# Patient Record
Sex: FEMALE | Race: WHITE | NOT HISPANIC OR LATINO | Employment: OTHER | URBAN - METROPOLITAN AREA
[De-identification: names, ages, dates, MRNs, and addresses within clinical notes are randomized per-mention and may not be internally consistent; named-entity substitution may affect disease eponyms.]

---

## 2017-12-04 ENCOUNTER — CONVERSION ENCOUNTER (OUTPATIENT)
Dept: MAMMOGRAPHY | Facility: HOSPITAL | Age: 62
End: 2017-12-04

## 2019-01-14 ENCOUNTER — HOSPITAL ENCOUNTER (OUTPATIENT)
Dept: GENERAL RADIOLOGY | Facility: HOSPITAL | Age: 64
Discharge: HOME | End: 2019-01-14
Attending: INTERNAL MEDICINE

## 2019-07-30 ENCOUNTER — OFFICE VISIT CONVERTED (OUTPATIENT)
Dept: FAMILY MEDICINE CLINIC | Facility: CLINIC | Age: 64
End: 2019-07-30
Attending: NURSE PRACTITIONER

## 2019-08-13 ENCOUNTER — HOSPITAL ENCOUNTER (OUTPATIENT)
Dept: GENERAL RADIOLOGY | Facility: HOSPITAL | Age: 64
Discharge: HOME | End: 2019-08-13
Attending: NURSE PRACTITIONER

## 2019-11-12 ENCOUNTER — OFFICE VISIT CONVERTED (OUTPATIENT)
Dept: FAMILY MEDICINE CLINIC | Facility: CLINIC | Age: 64
End: 2019-11-12
Attending: NURSE PRACTITIONER

## 2019-11-14 ENCOUNTER — HOSPITAL ENCOUNTER (OUTPATIENT)
Dept: LAB | Facility: HOSPITAL | Age: 64
Discharge: HOME | End: 2019-11-14
Attending: NURSE PRACTITIONER

## 2019-11-14 LAB
25(OH)D3 SERPL-MCNC: 34 NG/ML (ref 30–100)
ALBUMIN SERPL-MCNC: 4.4 G/DL (ref 3.5–5)
ALBUMIN/GLOB SERPL: 1.5 {RATIO} (ref 1.4–2.6)
ALP SERPL-CCNC: 84 U/L (ref 43–160)
ALT SERPL-CCNC: 10 U/L (ref 10–40)
ANION GAP SERPL CALC-SCNC: 18 MMOL/L (ref 8–19)
AST SERPL-CCNC: 19 U/L (ref 15–50)
BASOPHILS # BLD AUTO: 0.06 10*3/UL (ref 0–0.2)
BASOPHILS NFR BLD AUTO: 0.6 % (ref 0–3)
BILIRUB SERPL-MCNC: 0.43 MG/DL (ref 0.2–1.3)
BUN SERPL-MCNC: 5 MG/DL (ref 5–25)
BUN/CREAT SERPL: 7 {RATIO} (ref 6–20)
CALCIUM SERPL-MCNC: 9.8 MG/DL (ref 8.7–10.4)
CHLORIDE SERPL-SCNC: 97 MMOL/L (ref 99–111)
CHOLEST SERPL-MCNC: 217 MG/DL (ref 107–200)
CHOLEST/HDLC SERPL: 3.4 {RATIO} (ref 3–6)
CONV ABS IMM GRAN: 0.03 10*3/UL (ref 0–0.2)
CONV CO2: 26 MMOL/L (ref 22–32)
CONV IMMATURE GRAN: 0.3 % (ref 0–1.8)
CONV TOTAL PROTEIN: 7.3 G/DL (ref 6.3–8.2)
CREAT UR-MCNC: 0.73 MG/DL (ref 0.5–0.9)
DEPRECATED RDW RBC AUTO: 50.3 FL (ref 36.4–46.3)
EOSINOPHIL # BLD AUTO: 0.19 10*3/UL (ref 0–0.7)
EOSINOPHIL # BLD AUTO: 2 % (ref 0–7)
ERYTHROCYTE [DISTWIDTH] IN BLOOD BY AUTOMATED COUNT: 14.5 % (ref 11.7–14.4)
GFR SERPLBLD BASED ON 1.73 SQ M-ARVRAT: >60 ML/MIN/{1.73_M2}
GLOBULIN UR ELPH-MCNC: 2.9 G/DL (ref 2–3.5)
GLUCOSE SERPL-MCNC: 97 MG/DL (ref 65–99)
HCT VFR BLD AUTO: 45 % (ref 37–47)
HDLC SERPL-MCNC: 63 MG/DL (ref 40–60)
HGB BLD-MCNC: 15.1 G/DL (ref 12–16)
LDLC SERPL CALC-MCNC: 137 MG/DL (ref 70–100)
LYMPHOCYTES # BLD AUTO: 4.21 10*3/UL (ref 1–5)
LYMPHOCYTES NFR BLD AUTO: 44 % (ref 20–45)
MCH RBC QN AUTO: 31.3 PG (ref 27–31)
MCHC RBC AUTO-ENTMCNC: 33.6 G/DL (ref 33–37)
MCV RBC AUTO: 93.2 FL (ref 81–99)
MONOCYTES # BLD AUTO: 0.93 10*3/UL (ref 0.2–1.2)
MONOCYTES NFR BLD AUTO: 9.7 % (ref 3–10)
NEUTROPHILS # BLD AUTO: 4.14 10*3/UL (ref 2–8)
NEUTROPHILS NFR BLD AUTO: 43.4 % (ref 30–85)
NRBC CBCN: 0 % (ref 0–0.7)
OSMOLALITY SERPL CALC.SUM OF ELEC: 281 MOSM/KG (ref 273–304)
PLATELET # BLD AUTO: 400 10*3/UL (ref 130–400)
PMV BLD AUTO: 9.1 FL (ref 9.4–12.3)
POTASSIUM SERPL-SCNC: 4.2 MMOL/L (ref 3.5–5.3)
RBC # BLD AUTO: 4.83 10*6/UL (ref 4.2–5.4)
SODIUM SERPL-SCNC: 137 MMOL/L (ref 135–147)
T4 FREE SERPL-MCNC: 1.2 NG/DL (ref 0.9–1.8)
TRIGL SERPL-MCNC: 85 MG/DL (ref 40–150)
TSH SERPL-ACNC: 3.49 M[IU]/L (ref 0.27–4.2)
VLDLC SERPL-MCNC: 17 MG/DL (ref 5–37)
WBC # BLD AUTO: 9.56 10*3/UL (ref 4.8–10.8)

## 2019-11-15 LAB
CONV HEPATITIS C AB WITH REFLEX TO CONFIRMATION: <0.1 S/CO RATIO (ref 0–0.9)
CONV HEPATITIS COMMENT: NORMAL

## 2020-02-10 ENCOUNTER — HOSPITAL ENCOUNTER (OUTPATIENT)
Dept: GENERAL RADIOLOGY | Facility: HOSPITAL | Age: 65
Discharge: HOME | End: 2020-02-10
Attending: NURSE PRACTITIONER

## 2021-03-11 ENCOUNTER — OFFICE VISIT CONVERTED (OUTPATIENT)
Dept: FAMILY MEDICINE CLINIC | Facility: CLINIC | Age: 66
End: 2021-03-11
Attending: NURSE PRACTITIONER

## 2021-03-11 ENCOUNTER — CONVERSION ENCOUNTER (OUTPATIENT)
Dept: FAMILY MEDICINE CLINIC | Facility: CLINIC | Age: 66
End: 2021-03-11

## 2021-03-16 ENCOUNTER — HOSPITAL ENCOUNTER (OUTPATIENT)
Dept: LAB | Facility: HOSPITAL | Age: 66
Discharge: HOME | End: 2021-03-16
Attending: NURSE PRACTITIONER

## 2021-03-16 LAB
ALBUMIN SERPL-MCNC: 4 G/DL (ref 3.5–5)
ALBUMIN/GLOB SERPL: 1.1 {RATIO} (ref 1.4–2.6)
ALP SERPL-CCNC: 93 U/L (ref 43–160)
ALT SERPL-CCNC: 14 U/L (ref 10–40)
ANION GAP SERPL CALC-SCNC: 15 MMOL/L (ref 8–19)
AST SERPL-CCNC: 22 U/L (ref 15–50)
BASOPHILS # BLD AUTO: 0.04 10*3/UL (ref 0–0.2)
BASOPHILS NFR BLD AUTO: 0.5 % (ref 0–3)
BILIRUB SERPL-MCNC: 0.43 MG/DL (ref 0.2–1.3)
BUN SERPL-MCNC: 8 MG/DL (ref 5–25)
BUN/CREAT SERPL: 10 {RATIO} (ref 6–20)
CALCIUM SERPL-MCNC: 9.5 MG/DL (ref 8.7–10.4)
CHLORIDE SERPL-SCNC: 100 MMOL/L (ref 99–111)
CHOLEST SERPL-MCNC: 193 MG/DL (ref 107–200)
CHOLEST/HDLC SERPL: 3.1 {RATIO} (ref 3–6)
CONV ABS IMM GRAN: 0.01 10*3/UL (ref 0–0.2)
CONV CO2: 27 MMOL/L (ref 22–32)
CONV IMMATURE GRAN: 0.1 % (ref 0–1.8)
CONV TOTAL PROTEIN: 7.5 G/DL (ref 6.3–8.2)
CREAT UR-MCNC: 0.82 MG/DL (ref 0.5–0.9)
DEPRECATED RDW RBC AUTO: 50.6 FL (ref 36.4–46.3)
EOSINOPHIL # BLD AUTO: 0.09 10*3/UL (ref 0–0.7)
EOSINOPHIL # BLD AUTO: 1.1 % (ref 0–7)
ERYTHROCYTE [DISTWIDTH] IN BLOOD BY AUTOMATED COUNT: 14.6 % (ref 11.7–14.4)
GFR SERPLBLD BASED ON 1.73 SQ M-ARVRAT: >60 ML/MIN/{1.73_M2}
GLOBULIN UR ELPH-MCNC: 3.5 G/DL (ref 2–3.5)
GLUCOSE SERPL-MCNC: 98 MG/DL (ref 65–99)
HCT VFR BLD AUTO: 47.9 % (ref 37–47)
HDLC SERPL-MCNC: 63 MG/DL (ref 40–60)
HGB BLD-MCNC: 15.7 G/DL (ref 12–16)
LDLC SERPL CALC-MCNC: 114 MG/DL (ref 70–100)
LYMPHOCYTES # BLD AUTO: 4.52 10*3/UL (ref 1–5)
LYMPHOCYTES NFR BLD AUTO: 55.1 % (ref 20–45)
MCH RBC QN AUTO: 31.5 PG (ref 27–31)
MCHC RBC AUTO-ENTMCNC: 32.8 G/DL (ref 33–37)
MCV RBC AUTO: 96 FL (ref 81–99)
MONOCYTES # BLD AUTO: 0.94 10*3/UL (ref 0.2–1.2)
MONOCYTES NFR BLD AUTO: 11.5 % (ref 3–10)
NEUTROPHILS # BLD AUTO: 2.6 10*3/UL (ref 2–8)
NEUTROPHILS NFR BLD AUTO: 31.7 % (ref 30–85)
NRBC CBCN: 0 % (ref 0–0.7)
OSMOLALITY SERPL CALC.SUM OF ELEC: 284 MOSM/KG (ref 273–304)
PLATELET # BLD AUTO: 404 10*3/UL (ref 130–400)
PMV BLD AUTO: 9.6 FL (ref 9.4–12.3)
POTASSIUM SERPL-SCNC: 4 MMOL/L (ref 3.5–5.3)
RBC # BLD AUTO: 4.99 10*6/UL (ref 4.2–5.4)
SODIUM SERPL-SCNC: 138 MMOL/L (ref 135–147)
T4 FREE SERPL-MCNC: 1.3 NG/DL (ref 0.9–1.8)
TRIGL SERPL-MCNC: 81 MG/DL (ref 40–150)
TSH SERPL-ACNC: 6.75 M[IU]/L (ref 0.27–4.2)
VLDLC SERPL-MCNC: 16 MG/DL (ref 5–37)
WBC # BLD AUTO: 8.2 10*3/UL (ref 4.8–10.8)

## 2021-05-10 NOTE — H&P
History and Physical      Patient Name: Selene Forrest   Patient ID: [id number]   Sex: Female   YOB: 1955    Primary Care Provider: Gwen ALVAREZ   Referring Provider: Gwen ALVAREZ    Visit Date: March 11, 2021    Provider: KIM Curiel   Location: [facility]   Location Address: [address] [place] [address], [address]  [place], [place]  [id number]   Location Phone: [phone]          Chief Complaint  · New Pt. - establish care      History Of Present Illness  Selene Forrest is a 65 year old /White female who presents for evaluation and treatment of:      Pt is here to establish care. Pt was previously seen by Julia Brandon. Pt would like to discuss starting Chantix. Pt tried back in 2008 and was successful but has since started back.  She smokes 1ppd for 30 + years. Pt states she is ready to quit.    Pt is due labs.    Pt is due mammogram and dexa.    Pt will have MAW visit today, also.     Pt is due LDCT.    Pt sees Dr. Elmore q 3 months for PSY care and med mgmt.       Past Medical History  Disease Name Date Onset Notes   Bone Density Screening 8/13/2019 --    Depression --  --    Screening Mammogram 1/14/2019 --          Past Surgical History  Procedure Name Date Notes   Breast augmentation surgery, bilateral --  --    Dilation and Curettage --  --    Hernia Repair --  --    Lumpectomy --  --    nose --  --    Tubal ligation --  --          Medication List  Name Date Started Instructions   aspirin 81 mg oral tablet,delayed release (/EC) 11/12/2019 take 1 tablet (81 mg) by oral route once daily for 90 days   Prozac 20 mg oral capsule  take 1 capsule (20 mg) by oral route once daily   Vitamin D3 5,000 unit oral tablet 11/12/2019 take 1 tablet by oral route daily for 90 days         Allergy List  Allergen Name Date Reaction Notes   NO KNOWN DRUG ALLERGIES --  --  --          Family Medical History  Disease Name Relative/Age Notes   Stroke Grandfather  "(maternal)/  Mother/   --    Heart Disease Brother/  Father/  Grandfather (paternal)/  Grandmother (paternal)/   --    Hypertension Mother/   --    Breast Neoplasm Aunt/  Mother/  Uncle/   --    Diabetes  --          Social History  Finding Status Start/Stop Quantity Notes   Alcohol Current some day --/-- --  --    Sedentary --  --/-- --  --    Tobacco Current every day 20/-- 1 ppd --          Immunizations  NameDate Admin Mfg Trade Name Lot Number Route Inj VIS Given VIS Publication   InfluenzaRefused 03/11/2021 NE Not Entered  NE NE     Comments:          Review of Systems  · Constitutional  o Admits  o : fatigue  o Denies  o : fever, weight loss, weight gain  · Breasts  o Denies  o : lumps, tenderness, swelling  · Cardiovascular  o Denies  o : lower extremity edema, claudication, chest pressure, palpitations  · Respiratory  o Denies  o : shortness of breath, wheezing, cough, hemoptysis, dyspnea on exertion  · Gastrointestinal  o Denies  o : nausea, vomiting, diarrhea, constipation, abdominal pain  · Psychiatric  o Admits  o : anxiety, depression      Vitals  Date Time BP Position Site L\R Cuff Size HR RR TEMP (F) WT  HT  BMI kg/m2 BSA m2 O2 Sat FR L/min FiO2 HC       07/30/2019 02:48 /75 Sitting    80 - R 20 97.8 164lbs 6oz 5'  3\" 29.12 1.82 94 %  21%    11/12/2019 01:35 /82 Sitting    90 - R   164lbs 3oz 5'  3\" 29.08 1.82       03/11/2021 03:09 /71 Sitting    82 - R   176lbs 16oz 5'  3\" 31.35 1.89 93 %  21%          Physical Examination  · Constitutional  o Appearance  o : well-nourished, well developed, alert, in no acute distress  · Ears, Nose, Mouth and Throat  o Ears  o :   § External Ears  § : appearance within normal limits, no lesions present  § Otoscopic Examination  § : tympanic membrane appearance within normal limits bilaterally without perforations, mobility normal  o Nose  o :   § External Nose  § : normal stucture noted.  § Intranasal Exam  § : no swelling, reddness, turbs normal " samantha.  o Oral Cavity  o :   § Oral Mucosa  § : oral mucosa normal without pallor or cyanosis  § Lips  § : lip appearance normal  § Teeth  § : normal dentition for age  § Gums  § : gums pink, non-swollen, no bleeding present  § Tongue  § : tongue appearance normal  § Palate  § : hard palate normal, soft palate appearance normal  o Throat  o :   § Oropharynx  § : no inflammation or lesions present, tonsils within normal limits  · Respiratory  o Respiratory Effort  o : breathing unlabored  o Auscultation of Lungs  o : normal breath sounds throughout  · Cardiovascular  o Heart  o :   § Auscultation of Heart  § : regular rate and rhythm, no murmurs, gallops or rubs  § Palpation of Heart  § : normal apical impulse, no cardiac thrill present  o Peripheral Vascular System  o :   § Extremities  § : no cyanosis, clubbing or edema; less than 2 second refill noted  · Skin and Subcutaneous Tissue  o General Inspection  o : no rashes or lesions present, no areas of discoloration  · Neurologic  o Mental Status Examination  o :   § Orientation  § : grossly oriented to person, place and time  o Cranial Nerves  o : cranial nerves intact and symmetric throughout  · Psychiatric  o Mood and Affect  o : mood normal, affect appropriate, denies any SI/HI          Assessment  · Depression     311/F32.9  · Fatigue     780.79/R53.83  · Nicotine dependence     305.1/F17.200  · Post-menopause     V49.81/Z78.0  · Screening for lipid disorders     V77.91/Z13.220  · Visit for screening mammogram     V76.12/Z12.31  · Establishing care with new doctor, encounter for     V65.8/Z76.89      Plan  · Orders  o CBC with Auto Diff [facility] ([id number]) - 780.79/R53.83 - 03/11/2021  o CMP [facility] ([id number]) - 780.79/R53.83 - 03/11/2021  o Thyroid Profile (36600, 95061, THYII) - 780.79/R53.83 - 03/11/2021  o Smoking cessation counseling, 3-10 minutes [facility] ([id number]) - 305.1/F17.200 - 03/11/2021  o ACO-17: Screened for tobacco use AND received tobacco cessation intervention  "(4004F) - 305.1/F17.200 - 03/11/2021  o Low dose CT scan (LDCT) for lung cancer screening [facility] () - 305.1/F17.200 - 03/11/2021  o DEXA Bone Density, 1 or more sites, axial skeleton [facility] (93804) - V49.81/Z78.0 - 03/11/2021   8/3/21 @ PRAMOD 2 pm.  o Lipid Panel [facility] (40951) - V77.91/Z13.220 - 03/11/2021  o Screening Mammography; Bilateral 3D (71647, , 76591) - V76.12/Z12.31 - 03/11/2021   PRAMOD 8/3/21 @ 2 pm  o ACO-14: Influenza immunization was not administered for reasons documented [facility] () - - 03/11/2021  o ACO-19: Colorectal cancer screening results documented and reviewed (3017F) - - 03/11/2021  o ACO-39: Current medications updated and reviewed (, 1159F) - - 03/11/2021  · Medications  o Chantix Starting Month Box 0.5 mg (11)- 1 mg (42) oral tablets,dose pack   SIG: take as directed   DISP: (1) Packet with 0 refills  Prescribed on 03/11/2021     o Chantix Continuing Month Box 1 mg oral tablet   SIG: take 1 tablet (1 mg) with glass of water by oral route 2 times per day after meals for 12 weeks   DISP: (168) Tablet with 0 refills  Prescribed on 03/11/2021     o Medications have been Reconciled  o Transition of Care or Provider Policy  · Instructions  o Patient agrees to a \"No Self Harm\" contract. Patient will either call us, 1, , [facility], [facility].  o *Form of nicotine being used: cigarettes  o Patient was strongly encouraged to discontinue use of any nicotine containing product or minimize the use of the product.  o Discussed smoking cessation and counseling with patient for over 3 minutes.  o Stop taking calcium supplements for at least 48 hours prior to your exam. Failure to stop supplements could alter results, and the radiologists will require you to reschedule your test.  o Patient was educated/instructed on their diagnosis, treatment and medications prior to discharge from the clinic today.  o Patient instructed to seek medical attention urgently for new " or worsening symptoms.  o Call the office with any concerns or questions.  · Disposition  o Call or Return if symptoms worsen or persist.  o Return Visit Request in/on 6 months +/- 2 weeks (89075).            Electronically Signed by: KIM Curiel -Author on March 11, 2021 03:28:25 PM

## 2021-05-14 VITALS
WEIGHT: 177 LBS | HEART RATE: 82 BPM | BODY MASS INDEX: 31.36 KG/M2 | HEIGHT: 63 IN | SYSTOLIC BLOOD PRESSURE: 157 MMHG | DIASTOLIC BLOOD PRESSURE: 71 MMHG | OXYGEN SATURATION: 93 %

## 2021-05-14 NOTE — PROGRESS NOTES
Progress Note      Patient Name: Selene Forrest   Patient ID: [id number]   Sex: Female   YOB: 1955    Primary Care Provider: Julia ALVAREZ   Referring Provider: Julia ALVAREZ    Visit Date: March 11, 2021    Provider: KIM Curiel   Location: [facility]   Location Address: [address] [place] [address], [address]  [place], [place]  [id number]   Location Phone: [phone]          Chief Complaint  · Annual Wellness Exam      History Of Present Illness  The patient is a 65 year old /White female who has come to this office for her Annual Wellness Visit.   Her Primary Care Provider is PRIMARY CARE PROVIDER NAME. Her comprehensive Care Team list, including suppliers, has been updated on the Facesheet. Her medical/family history, height, weight, BMI, and blood pressure have been reviewed and are in the chart. The Health Risk Assessment has been completed and scanned in the chart.   Medications are listed in the medication list.   The active problem list includes: Depression   The patient does not have a history of substance use.   Patient reports her diet is adequate.   The Mini-Cog has been administered and is scanned in chart. The results are negative. Her cognitive function is without limitation.   A hearing loss screen was completed today and the result is negative.   Patient does not have any risk factors for depression. Patient completed the PHQ-9 today and it has been scanned in the chart. The total score is 1-4.   The Timed Up and Go screen was administered today and the result is negative.   The [place] Index of [place] in ADLs indicated full function (score of 6).   A Falls Risk Assessment has been completed, including a review of home fall hazards and medication review.   Overall, the patient's functional ability is noted by this provider to be within normal limits. Her level of safety is noted to be within normal limits. Her balance/gait is within  normal limits. There have been no falls in the past year. Patient-specific home safety recommendations have been reviewed and a copy has been given to patient.   She denies issues with leaking urine.   There are no additional risk factors identified.   Living Will/Advanced Directive has not previously been completed.   Personalized health advice was given to the patient and a written health screening schedule was established; see Plan for details.       Past Medical History  Disease Name Date Onset Notes   Bone Density Screening 8/13/2019 --    Depression --  --    Screening Mammogram 1/14/2019 --          Past Surgical History  Procedure Name Date Notes   Breast augmentation surgery, bilateral --  --    Dilation and Curettage --  --    Hernia Repair --  --    Lumpectomy --  --    nose --  --    Tubal ligation --  --          Medication List  Name Date Started Instructions   aspirin 81 mg oral tablet,delayed release (/EC) 11/12/2019 take 1 tablet (81 mg) by oral route once daily for 90 days   Chantix Continuing Month Box 1 mg oral tablet 03/11/2021 take 1 tablet (1 mg) with glass of water by oral route 2 times per day after meals for 12 weeks   Chantix Starting Month Box 0.5 mg (11)- 1 mg (42) oral tablets,dose pack 03/11/2021 take as directed   Prozac 20 mg oral capsule  take 1 capsule (20 mg) by oral route once daily   Vitamin D3 5,000 unit oral tablet 11/12/2019 take 1 tablet by oral route daily for 90 days         Allergy List  Allergen Name Date Reaction Notes   NO KNOWN DRUG ALLERGIES --  --  --          Family Medical History  Disease Name Relative/Age Notes   Stroke Grandfather (maternal)/  Mother/   --    Heart Disease Brother/  Father/  Grandfather (paternal)/  Grandmother (paternal)/   --    Hypertension Mother/   --    Breast Neoplasm Aunt/  Mother/  Uncle/   --    Diabetes  --          Social History  Finding Status Start/Stop Quantity Notes   Alcohol Current some day --/-- --  --    Sedentary --   "--/-- --  --    Tobacco Current every day 20/-- 1 ppd --          Immunizations  NameDate Admin Mfg Trade Name Lot Number Route Inj VIS Given VIS Publication   InfluenzaRefused 03/11/2021 NE Not Entered  NE NE     Comments:          Vitals  Date Time BP Position Site L\R Cuff Size HR RR TEMP (F) WT  HT  BMI kg/m2 BSA m2 O2 Sat FR L/min FiO2 HC       07/30/2019 02:48 /75 Sitting    80 - R 20 97.8 164lbs 6oz 5'  3\" 29.12 1.82 94 %  21%    11/12/2019 01:35 /82 Sitting    90 - R   164lbs 3oz 5'  3\" 29.08 1.82       03/11/2021 03:09 /71 Sitting    82 - R   176lbs 16oz 5'  3\" 31.35 1.89 93 %  21%          Physical Examination  · Ears, Nose, Mouth and Throat  o Ears  o :   § External Ears  § : appearance within normal limits, no lesions present  § Otoscopic Examination  § : tympanic membrane appearance within normal limits bilaterally without perforations, mobility normal  o Nose  o :   § External Nose  § : normal stucture noted.  § Intranasal Exam  § : no swelling, reddness, turbs normal samantha.  o Oral Cavity  o :   § Oral Mucosa  § : oral mucosa normal without pallor or cyanosis  § Lips  § : lip appearance normal  § Teeth  § : normal dentition for age  § Gums  § : gums pink, non-swollen, no bleeding present  § Tongue  § : tongue appearance normal  § Palate  § : hard palate normal, soft palate appearance normal  o Throat  o :   § Oropharynx  § : no inflammation or lesions present, tonsils within normal limits  · Respiratory  o Respiratory Effort  o : breathing unlabored  o Auscultation of Lungs  o : normal breath sounds throughout  · Cardiovascular  o Heart  o :   § Auscultation of Heart  § : regular rate and rhythm, no murmurs, gallops or rubs  § Palpation of Heart  § : normal apical impulse, no cardiac thrill present  o Peripheral Vascular System  o :   § Carotid Arteries  § : normal pulses bilaterally, no bruits present  § Pedal Pulses  § : pulses 2 bilaterally  § Extremities  § : no cyanosis, clubbing " or edema; less than 2 second refill noted  · Gastrointestinal  o Abdominal Examination  o : abdomen nontender to palpation, tone normal without rigidity or guarding, no masses present, abdominal contour scaphoid  o Liver and spleen  o : no hepatomegaly present, liver nontender to palpation, spleen not palpable  · Musculoskeletal  o Right Upper Extremity  o :   § Inspection/Palpation  § : no tenderness to palpation  § Range of Motion  § : range of motion normal, no joint crepitus or pain with motion present  o Left Upper Extremity  o :   § Inspection/Palpation  § : no tenderness to palpation  § Range of Motion  § : range of motion normal, no joint crepitus present, no pain with joint motion  o Right Lower Extremity  o :   § Inspection/Palpation  § : no joint or limb tenderness to palpation  § Range of Motion  § : range of motion normal, no joint crepitations present, no pain on motion  o Left Lower Extremity  o :   § Inspection/Palpation  § : no joint or limb tenderness to palpation  § Range of Motion  § : range of motion normal, no joint crepitations present, no pain on motion  · Skin and Subcutaneous Tissue  o General Inspection  o : no rashes or lesions present, no areas of discoloration  · Neurologic  o Mental Status Examination  o :   § Orientation  § : grossly oriented to person, place and time  o Cranial Nerves  o : cranial nerves intact and symmetric throughout  · Psychiatric  o Mood and Affect  o : mood normal, affect appropriate, denies any SI/HI          Assessment  · Encounter for Medicare annual wellness exam     V70.0/Z00.00  · Screening for depression     V79.0/Z13.89  · Screening for alcoholism     V79.1/Z13.39  · Advanced care planning/counseling discussion     V65.49/Z71.89      Plan  · Orders  o Falls Risk Assessment Completed (3288F) - V70.0/Z00.00 - 03/11/2021  o Brief hearing screening (written) [facility] () - V70.0/Z00.00 - 03/11/2021  o Presence or absence of urinary incontinence assessed (OVI)  (1090F) - V70.0/Z00.00 - 03/11/2021  o ACO-18: Negative screen for clinical depression using a standardized tool () - V79.0/Z13.89 - 03/11/2021  o Negative alcohol screening () - V79.1/Z13.39 - 03/11/2021  o ACO-19: Colorectal cancer screening results documented and reviewed (3017F) - - 03/11/2021  o ACO-39: Current medications updated and reviewed (, 1159F) - - 03/11/2021  o ACO - Pt declines to or was not able to provide an Advance Care Plan or name a Surrogate Decision Maker (1124F) - - 03/11/2021  · Instructions  o Health Risk Assessment has been reviewed with the patient.  o Written health screening schedule for next 5-10 years was established with patient; information scanned in chart and given/mailed to patient.  o Fall prevention methods discussed and a copy of recommendations given/mailed to patient.  o Depression Screen completed and scanned into the EMR under the designated folder within the patient's documents.  o Today's PHQ-9 result is _4__. Pt is currently in counselling.   o Audit-C score of 0-4 - Negative Screen - Brief Discussion  o Face-to-face Advanced Care Planning discussed for a minimum of 16 minutes.            Electronically Signed by: KIM Curiel -Author on March 11, 2021 03:58:01 PM

## 2021-05-15 VITALS
HEIGHT: 63 IN | WEIGHT: 164.19 LBS | BODY MASS INDEX: 29.09 KG/M2 | HEART RATE: 90 BPM | DIASTOLIC BLOOD PRESSURE: 82 MMHG | SYSTOLIC BLOOD PRESSURE: 135 MMHG

## 2021-05-15 VITALS
BODY MASS INDEX: 29.12 KG/M2 | TEMPERATURE: 97.8 F | RESPIRATION RATE: 20 BRPM | SYSTOLIC BLOOD PRESSURE: 146 MMHG | HEIGHT: 63 IN | OXYGEN SATURATION: 94 % | WEIGHT: 164.37 LBS | HEART RATE: 80 BPM | DIASTOLIC BLOOD PRESSURE: 75 MMHG

## 2021-05-22 ENCOUNTER — TRANSCRIBE ORDERS (OUTPATIENT)
Dept: FAMILY MEDICINE CLINIC | Facility: CLINIC | Age: 66
End: 2021-05-22

## 2021-05-22 DIAGNOSIS — Z12.31 VISIT FOR SCREENING MAMMOGRAM: Primary | ICD-10-CM

## 2021-05-22 DIAGNOSIS — R93.7 ABNORMAL BONE DENSITY SCREENING: ICD-10-CM

## 2021-08-03 ENCOUNTER — HOSPITAL ENCOUNTER (OUTPATIENT)
Dept: MAMMOGRAPHY | Facility: HOSPITAL | Age: 66
Discharge: HOME | End: 2021-08-03

## 2021-08-03 ENCOUNTER — HOSPITAL ENCOUNTER (OUTPATIENT)
Dept: BONE DENSITY | Facility: HOSPITAL | Age: 66
Discharge: HOME | End: 2021-08-03

## 2021-08-03 DIAGNOSIS — R93.7 ABNORMAL BONE DENSITY SCREENING: ICD-10-CM

## 2021-08-03 DIAGNOSIS — Z12.31 VISIT FOR SCREENING MAMMOGRAM: ICD-10-CM

## 2021-08-03 PROCEDURE — 77080 DXA BONE DENSITY AXIAL: CPT

## 2021-08-03 PROCEDURE — 77063 BREAST TOMOSYNTHESIS BI: CPT

## 2021-08-03 PROCEDURE — 77067 SCR MAMMO BI INCL CAD: CPT

## 2021-09-09 PROBLEM — R93.7 ABNORMAL BONE DENSITY SCREENING: Status: ACTIVE | Noted: 2019-08-13

## 2021-09-09 PROBLEM — F32.A DEPRESSION: Status: ACTIVE | Noted: 2021-09-09

## 2021-09-09 RX ORDER — LEVOTHYROXINE SODIUM 0.03 MG/1
TABLET ORAL
COMMUNITY
Start: 2021-03-18 | End: 2021-09-13

## 2021-09-09 RX ORDER — FLUOXETINE HYDROCHLORIDE 20 MG/1
CAPSULE ORAL
COMMUNITY
End: 2022-09-28 | Stop reason: SDUPTHER

## 2021-09-13 ENCOUNTER — HOSPITAL ENCOUNTER (OUTPATIENT)
Dept: GENERAL RADIOLOGY | Facility: HOSPITAL | Age: 66
Discharge: HOME | End: 2021-09-13
Admitting: NURSE PRACTITIONER

## 2021-09-13 ENCOUNTER — OFFICE VISIT (OUTPATIENT)
Dept: FAMILY MEDICINE CLINIC | Facility: CLINIC | Age: 66
End: 2021-09-13

## 2021-09-13 VITALS
HEIGHT: 64 IN | OXYGEN SATURATION: 93 % | HEART RATE: 88 BPM | SYSTOLIC BLOOD PRESSURE: 171 MMHG | BODY MASS INDEX: 31 KG/M2 | DIASTOLIC BLOOD PRESSURE: 62 MMHG | WEIGHT: 181.6 LBS

## 2021-09-13 DIAGNOSIS — R06.02 SOB (SHORTNESS OF BREATH): ICD-10-CM

## 2021-09-13 DIAGNOSIS — F32.A DEPRESSION, UNSPECIFIED DEPRESSION TYPE: Primary | ICD-10-CM

## 2021-09-13 DIAGNOSIS — Z01.89 ROUTINE LAB DRAW: ICD-10-CM

## 2021-09-13 DIAGNOSIS — Z13.220 SCREENING FOR LIPID DISORDERS: ICD-10-CM

## 2021-09-13 DIAGNOSIS — H61.23 BILATERAL IMPACTED CERUMEN: ICD-10-CM

## 2021-09-13 DIAGNOSIS — E03.9 HYPOTHYROIDISM, UNSPECIFIED TYPE: ICD-10-CM

## 2021-09-13 DIAGNOSIS — H93.8X3 SENSATION OF FULLNESS IN BOTH EARS: ICD-10-CM

## 2021-09-13 DIAGNOSIS — R53.83 FATIGUE, UNSPECIFIED TYPE: ICD-10-CM

## 2021-09-13 PROCEDURE — 99213 OFFICE O/P EST LOW 20 MIN: CPT | Performed by: NURSE PRACTITIONER

## 2021-09-13 PROCEDURE — 71046 X-RAY EXAM CHEST 2 VIEWS: CPT

## 2021-09-13 PROCEDURE — 69209 REMOVE IMPACTED EAR WAX UNI: CPT | Performed by: NURSE PRACTITIONER

## 2021-09-13 NOTE — PROGRESS NOTES
Chief Complaint  F/u prozac, ear fullness and SOA    Subjective            Selene Forrest presents to [facility]  Patient is here for her 6 month follow up on Hypothyroidism and depreassion.  She never started the synthroid. She is doing well on the prozac. She wants to have her ears checked out, feels that they are full and painful.  Pt reports occasional SOA and was a 2ppd smoker for over 40 years she quit smoking 6-7 months ago.    She is due labs.         PMH  Past Medical History:   Diagnosis Date   • Depression    • Hx of bone density study 08/13/2019   • Visit for screening mammogram 01/14/2019       ALLERGY  No Known Allergies     SURGICALHX  Past Surgical History:   Procedure Laterality Date   • BREAST AUGMENTATION Bilateral 1992   • BREAST LUMPECTOMY     • DILATATION AND CURETTAGE     • HERNIA REPAIR     • NOSE SURGERY     • TUBAL ABDOMINAL LIGATION          SOCX  Social History     Tobacco Use   • Smoking status: Former Smoker     Packs/day: 2.00     Years: 40.00     Pack years: 80.00   • Smokeless tobacco: Never Used   • Tobacco comment: started at 21   Substance Use Topics   • Alcohol use: Yes       FAMHX  Family History   Problem Relation Age of Onset   • Stroke Mother    • Hypertension Mother    • Breast cancer Mother    • Heart disease Father    • Heart disease Brother    • Stroke Maternal Grandfather    • Heart disease Paternal Grandmother    • Heart disease Paternal Grandfather    • Breast cancer Other    • Breast cancer Other    • Diabetes Other         MEDSIGONLY  Current Outpatient Medications on File Prior to Visit   Medication Sig   • FLUoxetine (PROzac) 20 MG capsule Prozac 20 mg oral capsule take 1 capsule (20 mg) by oral route once daily   Active   • [DISCONTINUED] levothyroxine (Synthroid) 25 MCG tablet Synthroid 25 mcg oral tablet take 1 tablet (25 mcg) by oral route once daily on an empty stomach 30 minutes before breakfast for 90 days 3/18/2021   "Active     No current facility-administered medications on file prior to visit.       Health Maintenance Due   Topic Date Due   • Pneumococcal Vaccine 0-64 (1 of 2 - PPSV23) Never done   • TDAP/TD VACCINES (1 - Tdap) Never done   • ZOSTER VACCINE (1 of 2) Never done   • ANNUAL WELLNESS VISIT  Never done   • PAP SMEAR  Never done       Objective     /62   Pulse 88   Ht 162.6 cm (64\")   Wt 82.4 kg (181 lb 9.6 oz)   SpO2 93%   BMI 31.17 kg/m²       Physical Exam  Constitutional:       General: She is not in acute distress.     Appearance: Normal appearance. She is not ill-appearing.   HENT:      Head: Normocephalic and atraumatic.      Right Ear: Tympanic membrane, ear canal and external ear normal.      Left Ear: Tympanic membrane, ear canal and external ear normal.      Ears:      Comments: saamntha TM clear after large amount of cerumen removed via irrigation, pt tolerated well     Mouth/Throat:      Pharynx: No oropharyngeal exudate or posterior oropharyngeal erythema.   Cardiovascular:      Rate and Rhythm: Normal rate and regular rhythm.      Heart sounds: Normal heart sounds. No murmur heard.     Pulmonary:      Effort: Pulmonary effort is normal. No respiratory distress.      Breath sounds: Normal breath sounds.   Chest:      Chest wall: No tenderness.   Abdominal:      General: Abdomen is flat. Bowel sounds are normal. There is no distension.      Palpations: Abdomen is soft. There is no mass.      Tenderness: There is no abdominal tenderness. There is no guarding.   Musculoskeletal:         General: No swelling or tenderness. Normal range of motion.      Cervical back: Normal range of motion and neck supple.   Skin:     General: Skin is warm and dry.      Findings: No rash.   Neurological:      General: No focal deficit present.      Mental Status: She is alert and oriented to person, place, and time. Mental status is at baseline.      Gait: Gait normal.   Psychiatric:         Mood and Affect: Mood " normal.         Behavior: Behavior normal.         Thought Content: Thought content normal.         Judgment: Judgment normal.           Result Review :                  Ear Cerumen Removal    Date/Time: 9/13/2021 2:49 PM  Performed by: Gwen Chavez APRN  Authorized by: Gwen Chavez APRN     Anesthesia:  Local Anesthetic: none  Location details: left ear and right ear  Procedure type: irrigation   Sedation:  Patient sedated: no                Assessment and Plan        Diagnoses and all orders for this visit:    1. Depression, unspecified depression type (Primary)  Comments:  stable on Prozac 20mg    2. Hypothyroidism, unspecified type  Comments:  synthroid scipt never started, will recheck TSH today  Orders:  -     TSH; Future  -     T4, free; Future    3. Screening for lipid disorders  -     Lipid panel; Future    4. Routine lab draw  -     CBC w AUTO Differential; Future  -     Comprehensive metabolic panel; Future    5. Sensation of fullness in both ears    6. SOB (shortness of breath)  -     XR Chest PA & Lateral; Future    7. Fatigue, unspecified type  -     CBC w AUTO Differential; Future    8. Bilateral impacted cerumen  -     Ear Cerumen Removal              Follow Up     Return in about 6 months (around 3/13/2022).    Patient was given instructions and counseling regarding her condition or for health maintenance advice. Please see specific information pulled into the AVS if appropriate.         KIM Curiel

## 2021-09-14 ENCOUNTER — LAB (OUTPATIENT)
Dept: LAB | Facility: HOSPITAL | Age: 66
End: 2021-09-14

## 2021-09-14 ENCOUNTER — TELEPHONE (OUTPATIENT)
Dept: FAMILY MEDICINE CLINIC | Facility: CLINIC | Age: 66
End: 2021-09-14

## 2021-09-14 DIAGNOSIS — R53.83 FATIGUE, UNSPECIFIED TYPE: ICD-10-CM

## 2021-09-14 DIAGNOSIS — J44.1 COPD WITH EXACERBATION (HCC): Primary | ICD-10-CM

## 2021-09-14 DIAGNOSIS — Z01.89 ROUTINE LAB DRAW: ICD-10-CM

## 2021-09-14 DIAGNOSIS — E03.9 HYPOTHYROIDISM, UNSPECIFIED TYPE: ICD-10-CM

## 2021-09-14 DIAGNOSIS — Z13.220 SCREENING FOR LIPID DISORDERS: ICD-10-CM

## 2021-09-14 LAB
ALBUMIN SERPL-MCNC: 4.1 G/DL (ref 3.5–5.2)
ALBUMIN/GLOB SERPL: 1.3 G/DL
ALP SERPL-CCNC: 93 U/L (ref 39–117)
ALT SERPL W P-5'-P-CCNC: 15 U/L (ref 1–33)
ANION GAP SERPL CALCULATED.3IONS-SCNC: 9.3 MMOL/L (ref 5–15)
AST SERPL-CCNC: 17 U/L (ref 1–32)
BASOPHILS # BLD MANUAL: 0.09 10*3/MM3 (ref 0–0.2)
BASOPHILS NFR BLD AUTO: 1 % (ref 0–1.5)
BILIRUB SERPL-MCNC: 0.5 MG/DL (ref 0–1.2)
BUN SERPL-MCNC: 9 MG/DL (ref 8–23)
BUN/CREAT SERPL: 7.3 (ref 7–25)
CALCIUM SPEC-SCNC: 9.3 MG/DL (ref 8.6–10.5)
CHLORIDE SERPL-SCNC: 100 MMOL/L (ref 98–107)
CHOLEST SERPL-MCNC: 226 MG/DL (ref 0–200)
CO2 SERPL-SCNC: 26.7 MMOL/L (ref 22–29)
CREAT SERPL-MCNC: 1.24 MG/DL (ref 0.57–1)
DEPRECATED RDW RBC AUTO: 49.9 FL (ref 37–54)
EOSINOPHIL # BLD MANUAL: 0.76 10*3/MM3 (ref 0–0.4)
EOSINOPHIL NFR BLD MANUAL: 8.3 % (ref 0.3–6.2)
ERYTHROCYTE [DISTWIDTH] IN BLOOD BY AUTOMATED COUNT: 13.7 % (ref 12.3–15.4)
GFR SERPL CREATININE-BSD FRML MDRD: 44 ML/MIN/1.73
GLOBULIN UR ELPH-MCNC: 3.2 GM/DL
GLUCOSE SERPL-MCNC: 99 MG/DL (ref 65–99)
HCT VFR BLD AUTO: 44 % (ref 34–46.6)
HDLC SERPL-MCNC: 68 MG/DL (ref 40–60)
HGB BLD-MCNC: 14.3 G/DL (ref 12–15.9)
LDLC SERPL CALC-MCNC: 138 MG/DL (ref 0–100)
LDLC/HDLC SERPL: 1.99 {RATIO}
LYMPHOCYTES # BLD MANUAL: 3.16 10*3/MM3 (ref 0.7–3.1)
LYMPHOCYTES NFR BLD MANUAL: 34.4 % (ref 19.6–45.3)
LYMPHOCYTES NFR BLD MANUAL: 9.4 % (ref 5–12)
MCH RBC QN AUTO: 32 PG (ref 26.6–33)
MCHC RBC AUTO-ENTMCNC: 32.5 G/DL (ref 31.5–35.7)
MCV RBC AUTO: 98.4 FL (ref 79–97)
MONOCYTES # BLD AUTO: 0.86 10*3/MM3 (ref 0.1–0.9)
NEUTROPHILS # BLD AUTO: 4.31 10*3/MM3 (ref 1.7–7)
NEUTROPHILS NFR BLD MANUAL: 46.9 % (ref 42.7–76)
PLAT MORPH BLD: NORMAL
PLATELET # BLD AUTO: 385 10*3/MM3 (ref 140–450)
PMV BLD AUTO: 9.5 FL (ref 6–12)
POIKILOCYTOSIS BLD QL SMEAR: ABNORMAL
POLYCHROMASIA BLD QL SMEAR: ABNORMAL
POTASSIUM SERPL-SCNC: 4 MMOL/L (ref 3.5–5.2)
PROT SERPL-MCNC: 7.3 G/DL (ref 6–8.5)
RBC # BLD AUTO: 4.47 10*6/MM3 (ref 3.77–5.28)
SMUDGE CELLS BLD QL SMEAR: ABNORMAL
SODIUM SERPL-SCNC: 136 MMOL/L (ref 136–145)
T4 FREE SERPL-MCNC: 0.99 NG/DL (ref 0.93–1.7)
TRIGL SERPL-MCNC: 115 MG/DL (ref 0–150)
TSH SERPL DL<=0.05 MIU/L-ACNC: 4.81 UIU/ML (ref 0.27–4.2)
VLDLC SERPL-MCNC: 20 MG/DL (ref 5–40)
WBC # BLD AUTO: 9.2 10*3/MM3 (ref 3.4–10.8)

## 2021-09-14 PROCEDURE — 84439 ASSAY OF FREE THYROXINE: CPT

## 2021-09-14 PROCEDURE — 80053 COMPREHEN METABOLIC PANEL: CPT

## 2021-09-14 PROCEDURE — 84443 ASSAY THYROID STIM HORMONE: CPT

## 2021-09-14 PROCEDURE — 85007 BL SMEAR W/DIFF WBC COUNT: CPT

## 2021-09-14 PROCEDURE — 85025 COMPLETE CBC W/AUTO DIFF WBC: CPT

## 2021-09-14 PROCEDURE — 80061 LIPID PANEL: CPT

## 2021-09-14 PROCEDURE — 36415 COLL VENOUS BLD VENIPUNCTURE: CPT

## 2021-09-14 RX ORDER — BUDESONIDE, GLYCOPYRROLATE, AND FORMOTEROL FUMARATE 160; 9; 4.8 UG/1; UG/1; UG/1
2 AEROSOL, METERED RESPIRATORY (INHALATION) 2 TIMES DAILY
Qty: 10.7 G | Refills: 2 | Status: SHIPPED | OUTPATIENT
Start: 2021-09-14 | End: 2022-03-14

## 2021-09-14 NOTE — TELEPHONE ENCOUNTER
----- Message from KIM Curiel sent at 9/14/2021  6:30 AM EDT -----  Nothing acute copd changes noted start Breztri 2 inhalations BID

## 2021-09-15 ENCOUNTER — TELEPHONE (OUTPATIENT)
Dept: FAMILY MEDICINE CLINIC | Facility: CLINIC | Age: 66
End: 2021-09-15

## 2021-09-15 DIAGNOSIS — E03.9 HYPOTHYROIDISM, UNSPECIFIED TYPE: Primary | ICD-10-CM

## 2021-09-15 RX ORDER — LEVOTHYROXINE SODIUM 0.03 MG/1
25 TABLET ORAL DAILY
COMMUNITY
End: 2021-09-15 | Stop reason: SDUPTHER

## 2021-09-15 RX ORDER — LEVOTHYROXINE SODIUM 0.03 MG/1
25 TABLET ORAL DAILY
Qty: 90 TABLET | Refills: 1 | Status: SHIPPED | OUTPATIENT
Start: 2021-09-15 | End: 2022-03-14 | Stop reason: SDUPTHER

## 2021-09-15 NOTE — TELEPHONE ENCOUNTER
----- Message from KIM Curiel sent at 9/15/2021  6:54 AM EDT -----  Start synthroid 25mcg q day and recheck in 6 weeks, chol elevated encourage low chol diet and daily exercise recheck in 6 months

## 2022-03-14 ENCOUNTER — OFFICE VISIT (OUTPATIENT)
Dept: FAMILY MEDICINE CLINIC | Facility: CLINIC | Age: 67
End: 2022-03-14

## 2022-03-14 VITALS
WEIGHT: 183 LBS | DIASTOLIC BLOOD PRESSURE: 66 MMHG | HEIGHT: 64 IN | BODY MASS INDEX: 31.24 KG/M2 | HEART RATE: 90 BPM | OXYGEN SATURATION: 97 % | SYSTOLIC BLOOD PRESSURE: 143 MMHG

## 2022-03-14 VITALS
DIASTOLIC BLOOD PRESSURE: 66 MMHG | BODY MASS INDEX: 31.24 KG/M2 | SYSTOLIC BLOOD PRESSURE: 143 MMHG | HEIGHT: 64 IN | WEIGHT: 183 LBS | OXYGEN SATURATION: 97 % | HEART RATE: 90 BPM

## 2022-03-14 DIAGNOSIS — E03.9 HYPOTHYROIDISM, UNSPECIFIED TYPE: Primary | ICD-10-CM

## 2022-03-14 DIAGNOSIS — F32.A DEPRESSION, UNSPECIFIED DEPRESSION TYPE: ICD-10-CM

## 2022-03-14 DIAGNOSIS — Z12.31 ENCOUNTER FOR SCREENING MAMMOGRAM FOR BREAST CANCER: ICD-10-CM

## 2022-03-14 DIAGNOSIS — Z00.00 MEDICARE ANNUAL WELLNESS VISIT, SUBSEQUENT: Primary | ICD-10-CM

## 2022-03-14 DIAGNOSIS — Z12.11 SCREENING FOR COLON CANCER: ICD-10-CM

## 2022-03-14 DIAGNOSIS — E78.00 ELEVATED CHOLESTEROL: ICD-10-CM

## 2022-03-14 PROCEDURE — 1170F FXNL STATUS ASSESSED: CPT | Performed by: NURSE PRACTITIONER

## 2022-03-14 PROCEDURE — 99214 OFFICE O/P EST MOD 30 MIN: CPT | Performed by: NURSE PRACTITIONER

## 2022-03-14 PROCEDURE — 1160F RVW MEDS BY RX/DR IN RCRD: CPT | Performed by: NURSE PRACTITIONER

## 2022-03-14 PROCEDURE — G0439 PPPS, SUBSEQ VISIT: HCPCS | Performed by: NURSE PRACTITIONER

## 2022-03-14 RX ORDER — LEVOTHYROXINE SODIUM 0.03 MG/1
25 TABLET ORAL DAILY
Qty: 30 TABLET | Refills: 0 | Status: SHIPPED | OUTPATIENT
Start: 2022-03-14 | End: 2022-09-28 | Stop reason: SDUPTHER

## 2022-03-14 NOTE — PROGRESS NOTES
The ABCs of the Annual Wellness Visit  Subsequent Medicare Wellness Visit    Chief Complaint   Patient presents with   • Medicare Wellness-subsequent      Subjective    History of Present Illness:  Selene Forrest is a 66 y.o. female who presents for a Subsequent Medicare Wellness Visit.    The following portions of the patient's history were reviewed and   updated as appropriate: current medications, past family history, past medical history, past social history, past surgical history and problem list.    Compared to one year ago, the patient feels her physical   health is the same.    Compared to one year ago, the patient feels her mental   health is the same.    Recent Hospitalizations:  She was not admitted to the hospital during the last year.       Current Medical Providers:  Patient Care Team:  Gwen Chavez APRN as PCP - General (Nurse Practitioner)    Outpatient Medications Prior to Visit   Medication Sig Dispense Refill   • FLUoxetine (PROzac) 20 MG capsule Prozac 20 mg oral capsule take 1 capsule (20 mg) by oral route once daily   Active     • Budeson-Glycopyrrol-Formoterol (Breztri Aerosphere) 160-9-4.8 MCG/ACT aerosol inhaler Inhale 2 puffs 2 (Two) Times a Day. 10.7 g 2   • levothyroxine (SYNTHROID, LEVOTHROID) 25 MCG tablet Take 1 tablet by mouth Daily. 90 tablet 1     No facility-administered medications prior to visit.       No opioid medication identified on active medication list. I have reviewed chart for other potential  high risk medication/s and harmful drug interactions in the elderly.          Aspirin is not on active medication list.  Aspirin use is not indicated based on review of current medical condition/s. Risk of harm outweighs potential benefits.  .    Patient Active Problem List   Diagnosis   • Abnormal bone density screening   • Depression     Advance Care Planning  Advance Directive is not on file.  ACP discussion was declined by the patient. Patient does not have an advance  "directive, information provided.          Objective    Vitals:    03/14/22 1404   BP: 143/66   Pulse: 90   SpO2: 97%   Weight: 83 kg (183 lb)   Height: 162.6 cm (64\")     Body mass index is 31.41 kg/m².  BMI has not been calculated during today's encounter.     Does the patient have evidence of cognitive impairment? No                HEALTH RISK ASSESSMENT    Smoking Status:  Social History     Tobacco Use   Smoking Status Former Smoker   • Packs/day: 2.00   • Years: 40.00   • Pack years: 80.00   Smokeless Tobacco Never Used   Tobacco Comment    started at 21     Alcohol Consumption:  Social History     Substance and Sexual Activity   Alcohol Use Yes     Fall Risk Screen:    STEADI Fall Risk Assessment has not been completed.    Depression Screening:  PHQ-2/PHQ-9 Depression Screening 3/14/2022   Retired PHQ-9 Total Score -   Retired Total Score -   Little Interest or Pleasure in Doing Things 0-->not at all   Feeling Down, Depressed or Hopeless 0-->not at all   PHQ-9: Brief Depression Severity Measure Score 0       Health Habits and Functional and Cognitive Screening:  Functional & Cognitive Status 3/14/2022   Do you have difficulty preparing food and eating? No   Do you have difficulty bathing yourself, getting dressed or grooming yourself? No   Do you have difficulty using the toilet? No   Do you have difficulty moving around from place to place? No   Do you have trouble with steps or getting out of a bed or a chair? No   Current Diet Well Balanced Diet   Dental Exam Up to date   Eye Exam Up to date   Exercise (times per week) 0 times per week   Do you need help using the phone?  No   Are you deaf or do you have serious difficulty hearing?  No   Do you need help with transportation? No   Do you need help shopping? No   Do you need help preparing meals?  No   Do you need help with housework?  No   Do you need help with laundry? No   Do you need help taking your medications? No   Do you need help managing money? No "   Do you ever drive or ride in a car without wearing a seat belt? No   Have you felt unusual stress, anger or loneliness in the last month? No   Who do you live with? Alone   If you need help, do you have trouble finding someone available to you? No   Have you been bothered in the last four weeks by sexual problems? No   Do you have difficulty concentrating, remembering or making decisions? No       Age-appropriate Screening Schedule:  Refer to the list below for future screening recommendations based on patient's age, sex and/or medical conditions. Orders for these recommended tests are listed in the plan section. The patient has been provided with a written plan.    Health Maintenance   Topic Date Due   • TDAP/TD VACCINES (1 - Tdap) Never done   • ZOSTER VACCINE (1 of 2) Never done   • INFLUENZA VACCINE  08/01/2021   • LIPID PANEL  09/14/2022   • MAMMOGRAM  08/03/2023   • DXA SCAN  08/03/2023              Assessment/Plan   CMS Preventative Services Quick Reference  Risk Factors Identified During Encounter  None Identified  The above risks/problems have been discussed with the patient.  Follow up actions/plans if indicated are seen below in the Assessment/Plan Section.  Pertinent information has been shared with the patient in the After Visit Summary.    Diagnoses and all orders for this visit:    1. Medicare annual wellness visit, subsequent (Primary)        Follow Up:   Return in about 1 year (around 3/14/2023) for Medicare Wellness.     An After Visit Summary and PPPS were made available to the patient.

## 2022-03-14 NOTE — PROGRESS NOTES
Chief Complaint  Hypothyroidism and Depression and elevated cholestrol    Subjective            Selene Forrest presents to [facility]  Pt here today for 6 month follow up for Hypothyroidism and depression and elevated cholesterol.    Pt does well with Prozac 20mg    Pt needs refills on the Synthroid 25 mcg.  She did not have her TSH rechecked at 6 weeks as ordered but will go and have it checked..     Labs due to recheck cholestrol.     Pt to scheduled mammo after 08/03.    Silva due in august last was in 2019        Past Medical History:   Diagnosis Date   • Depression    • Hx of bone density study 08/13/2019   • Visit for screening mammogram 01/14/2019       No Known Allergies     Past Surgical History:   Procedure Laterality Date   • BREAST AUGMENTATION Bilateral 1992   • BREAST LUMPECTOMY     • DILATATION AND CURETTAGE     • HERNIA REPAIR     • NOSE SURGERY     • TUBAL ABDOMINAL LIGATION          Social History     Tobacco Use   • Smoking status: Former Smoker     Packs/day: 2.00     Years: 40.00     Pack years: 80.00   • Smokeless tobacco: Never Used   • Tobacco comment: started at 21   Substance Use Topics   • Alcohol use: Yes       Family History   Problem Relation Age of Onset   • Stroke Mother    • Hypertension Mother    • Breast cancer Mother    • Heart disease Father    • Heart disease Brother    • Stroke Maternal Grandfather    • Heart disease Paternal Grandmother    • Heart disease Paternal Grandfather    • Breast cancer Other    • Breast cancer Other    • Diabetes Other         Current Outpatient Medications on File Prior to Visit   Medication Sig   • FLUoxetine (PROzac) 20 MG capsule Prozac 20 mg oral capsule take 1 capsule (20 mg) by oral route once daily   Active   • [DISCONTINUED] levothyroxine (SYNTHROID, LEVOTHROID) 25 MCG tablet Take 1 tablet by mouth Daily.   • [DISCONTINUED] Budeson-Glycopyrrol-Formoterol (Breztri Aerosphere) 160-9-4.8 MCG/ACT aerosol  "inhaler Inhale 2 puffs 2 (Two) Times a Day.     No current facility-administered medications on file prior to visit.       Health Maintenance Due   Topic Date Due   • COVID-19 Vaccine (1) Never done   • TDAP/TD VACCINES (1 - Tdap) Never done   • ZOSTER VACCINE (1 of 2) Never done   • INFLUENZA VACCINE  08/01/2021   • Pneumococcal Vaccine 65+ (1 of 1 - PPSV23) Never done   • ANNUAL WELLNESS VISIT  03/11/2022       Objective     /66   Pulse 90   Ht 162.6 cm (64\")   Wt 83 kg (183 lb)   SpO2 97%   BMI 31.41 kg/m²       Physical Exam  Constitutional:       General: She is not in acute distress.     Appearance: Normal appearance. She is not ill-appearing.   HENT:      Head: Normocephalic and atraumatic.      Mouth/Throat:      Pharynx: No oropharyngeal exudate or posterior oropharyngeal erythema.   Neck:      Thyroid: No thyroid mass, thyromegaly or thyroid tenderness.   Cardiovascular:      Rate and Rhythm: Normal rate and regular rhythm.      Heart sounds: Normal heart sounds. No murmur heard.  Pulmonary:      Effort: Pulmonary effort is normal. No respiratory distress.      Breath sounds: Normal breath sounds.   Chest:      Chest wall: No tenderness.   Abdominal:      General: There is no distension.      Palpations: There is no mass.      Tenderness: There is no abdominal tenderness. There is no guarding.   Musculoskeletal:         General: No swelling or tenderness. Normal range of motion.      Cervical back: Normal range of motion and neck supple.   Skin:     General: Skin is warm and dry.      Findings: No rash.   Neurological:      General: No focal deficit present.      Mental Status: She is alert and oriented to person, place, and time. Mental status is at baseline.      Gait: Gait normal.   Psychiatric:         Attention and Perception: Attention normal.         Mood and Affect: Mood normal.         Speech: Speech normal.         Behavior: Behavior normal. Behavior is cooperative.         Thought " Content: Thought content normal.         Cognition and Memory: Cognition normal.         Judgment: Judgment normal.           Result Review :                           Assessment and Plan        Diagnoses and all orders for this visit:    1. Hypothyroidism, unspecified type (Primary)  -     TSH; Future  -     T4, free; Future  -     levothyroxine (SYNTHROID, LEVOTHROID) 25 MCG tablet; Take 1 tablet by mouth Daily.  Dispense: 30 tablet; Refill: 0    2. Encounter for screening mammogram for breast cancer  -     Mammo Screening Digital Tomosynthesis Bilateral With CAD; Future    3. Elevated cholesterol  Comments:  encourage low chol diet and daily exercise  Orders:  -     CBC w AUTO Differential; Future  -     Comprehensive metabolic panel; Future  -     Lipid panel; Future    4. Depression, unspecified depression type  Comments:  stable on prozac 20mg, continue              Follow Up     Return in about 6 months (around 9/14/2022).    Patient was given instructions and counseling regarding her condition or for health maintenance advice. Please see specific information pulled into the AVS if appropriate.     Selene Sylvester Lon  reports that she has quit smoking. She has a 80.00 pack-year smoking history. She has never used smokeless tobacco..

## 2022-03-18 ENCOUNTER — LAB (OUTPATIENT)
Dept: LAB | Facility: HOSPITAL | Age: 67
End: 2022-03-18

## 2022-03-18 DIAGNOSIS — E78.00 ELEVATED CHOLESTEROL: ICD-10-CM

## 2022-03-18 DIAGNOSIS — E03.9 HYPOTHYROIDISM, UNSPECIFIED TYPE: ICD-10-CM

## 2022-03-18 LAB
ALBUMIN SERPL-MCNC: 4.1 G/DL (ref 3.5–5.2)
ALBUMIN/GLOB SERPL: 1.3 G/DL
ALP SERPL-CCNC: 83 U/L (ref 39–117)
ALT SERPL W P-5'-P-CCNC: 15 U/L (ref 1–33)
ANION GAP SERPL CALCULATED.3IONS-SCNC: 12.1 MMOL/L (ref 5–15)
AST SERPL-CCNC: 20 U/L (ref 1–32)
BASOPHILS # BLD AUTO: 0.08 10*3/MM3 (ref 0–0.2)
BASOPHILS NFR BLD AUTO: 0.8 % (ref 0–1.5)
BILIRUB SERPL-MCNC: 0.4 MG/DL (ref 0–1.2)
BUN SERPL-MCNC: 8 MG/DL (ref 8–23)
BUN/CREAT SERPL: 10.7 (ref 7–25)
CALCIUM SPEC-SCNC: 9.4 MG/DL (ref 8.6–10.5)
CHLORIDE SERPL-SCNC: 104 MMOL/L (ref 98–107)
CHOLEST SERPL-MCNC: 222 MG/DL (ref 0–200)
CO2 SERPL-SCNC: 22.9 MMOL/L (ref 22–29)
CREAT SERPL-MCNC: 0.75 MG/DL (ref 0.57–1)
DEPRECATED RDW RBC AUTO: 43.5 FL (ref 37–54)
EGFRCR SERPLBLD CKD-EPI 2021: 88.5 ML/MIN/1.73
EOSINOPHIL # BLD AUTO: 0.44 10*3/MM3 (ref 0–0.4)
EOSINOPHIL NFR BLD AUTO: 4.5 % (ref 0.3–6.2)
ERYTHROCYTE [DISTWIDTH] IN BLOOD BY AUTOMATED COUNT: 12.9 % (ref 12.3–15.4)
GLOBULIN UR ELPH-MCNC: 3.1 GM/DL
GLUCOSE SERPL-MCNC: 94 MG/DL (ref 65–99)
HCT VFR BLD AUTO: 42.8 % (ref 34–46.6)
HDLC SERPL-MCNC: 65 MG/DL (ref 40–60)
HGB BLD-MCNC: 14.6 G/DL (ref 12–15.9)
IMM GRANULOCYTES # BLD AUTO: 0.02 10*3/MM3 (ref 0–0.05)
IMM GRANULOCYTES NFR BLD AUTO: 0.2 % (ref 0–0.5)
LDLC SERPL CALC-MCNC: 144 MG/DL (ref 0–100)
LDLC/HDLC SERPL: 2.19 {RATIO}
LYMPHOCYTES # BLD AUTO: 4.74 10*3/MM3 (ref 0.7–3.1)
LYMPHOCYTES NFR BLD AUTO: 49 % (ref 19.6–45.3)
MCH RBC QN AUTO: 31.7 PG (ref 26.6–33)
MCHC RBC AUTO-ENTMCNC: 34.1 G/DL (ref 31.5–35.7)
MCV RBC AUTO: 93 FL (ref 79–97)
MONOCYTES # BLD AUTO: 0.86 10*3/MM3 (ref 0.1–0.9)
MONOCYTES NFR BLD AUTO: 8.9 % (ref 5–12)
NEUTROPHILS NFR BLD AUTO: 3.54 10*3/MM3 (ref 1.7–7)
NEUTROPHILS NFR BLD AUTO: 36.6 % (ref 42.7–76)
NRBC BLD AUTO-RTO: 0 /100 WBC (ref 0–0.2)
PLATELET # BLD AUTO: 460 10*3/MM3 (ref 140–450)
PMV BLD AUTO: 9.5 FL (ref 6–12)
POTASSIUM SERPL-SCNC: 4.2 MMOL/L (ref 3.5–5.2)
PROT SERPL-MCNC: 7.2 G/DL (ref 6–8.5)
RBC # BLD AUTO: 4.6 10*6/MM3 (ref 3.77–5.28)
SODIUM SERPL-SCNC: 139 MMOL/L (ref 136–145)
T4 FREE SERPL-MCNC: 1.27 NG/DL (ref 0.93–1.7)
TRIGL SERPL-MCNC: 72 MG/DL (ref 0–150)
TSH SERPL DL<=0.05 MIU/L-ACNC: 3.27 UIU/ML (ref 0.27–4.2)
VLDLC SERPL-MCNC: 13 MG/DL (ref 5–40)
WBC NRBC COR # BLD: 9.68 10*3/MM3 (ref 3.4–10.8)

## 2022-03-18 PROCEDURE — 80053 COMPREHEN METABOLIC PANEL: CPT

## 2022-03-18 PROCEDURE — 36415 COLL VENOUS BLD VENIPUNCTURE: CPT

## 2022-03-18 PROCEDURE — 80061 LIPID PANEL: CPT

## 2022-03-18 PROCEDURE — 84439 ASSAY OF FREE THYROXINE: CPT

## 2022-03-18 PROCEDURE — 85025 COMPLETE CBC W/AUTO DIFF WBC: CPT

## 2022-03-18 PROCEDURE — 84443 ASSAY THYROID STIM HORMONE: CPT

## 2022-03-21 ENCOUNTER — TELEPHONE (OUTPATIENT)
Dept: FAMILY MEDICINE CLINIC | Facility: CLINIC | Age: 67
End: 2022-03-21

## 2022-03-21 DIAGNOSIS — E78.00 ELEVATED CHOLESTEROL: Primary | ICD-10-CM

## 2022-03-21 DIAGNOSIS — R79.89 ELEVATED PLATELET COUNT: ICD-10-CM

## 2022-03-21 NOTE — TELEPHONE ENCOUNTER
----- Message from KIM Curiel sent at 3/20/2022  2:08 PM EDT -----  Bad chol is elevated encourage diet and exercise recheck in 6 months if not improved recommend statin therapy, platelet count elevated slightly recheck in 1 month

## 2022-08-05 ENCOUNTER — HOSPITAL ENCOUNTER (OUTPATIENT)
Dept: MAMMOGRAPHY | Facility: HOSPITAL | Age: 67
Discharge: HOME | End: 2022-08-05
Admitting: NURSE PRACTITIONER

## 2022-08-05 DIAGNOSIS — Z12.31 ENCOUNTER FOR SCREENING MAMMOGRAM FOR BREAST CANCER: ICD-10-CM

## 2022-08-05 PROCEDURE — 77067 SCR MAMMO BI INCL CAD: CPT

## 2022-08-05 PROCEDURE — 77063 BREAST TOMOSYNTHESIS BI: CPT

## 2022-09-28 ENCOUNTER — OFFICE VISIT (OUTPATIENT)
Dept: FAMILY MEDICINE CLINIC | Facility: CLINIC | Age: 67
End: 2022-09-28

## 2022-09-28 VITALS
HEART RATE: 84 BPM | DIASTOLIC BLOOD PRESSURE: 76 MMHG | WEIGHT: 186 LBS | OXYGEN SATURATION: 95 % | BODY MASS INDEX: 31.76 KG/M2 | SYSTOLIC BLOOD PRESSURE: 149 MMHG | HEIGHT: 64 IN

## 2022-09-28 DIAGNOSIS — R79.89 ELEVATED PLATELET COUNT: ICD-10-CM

## 2022-09-28 DIAGNOSIS — E78.2 ELEVATED CHOLESTEROL WITH ELEVATED TRIGLYCERIDES: ICD-10-CM

## 2022-09-28 DIAGNOSIS — F32.A DEPRESSION, UNSPECIFIED DEPRESSION TYPE: ICD-10-CM

## 2022-09-28 DIAGNOSIS — E03.9 HYPOTHYROIDISM, UNSPECIFIED TYPE: Primary | ICD-10-CM

## 2022-09-28 PROCEDURE — 99213 OFFICE O/P EST LOW 20 MIN: CPT | Performed by: NURSE PRACTITIONER

## 2022-09-28 RX ORDER — LEVOTHYROXINE SODIUM 0.03 MG/1
25 TABLET ORAL DAILY
Qty: 30 TABLET | Refills: 0 | Status: SHIPPED | OUTPATIENT
Start: 2022-09-28 | End: 2023-03-16 | Stop reason: SDUPTHER

## 2022-09-28 RX ORDER — FLUOXETINE HYDROCHLORIDE 20 MG/1
20 CAPSULE ORAL DAILY
Qty: 90 CAPSULE | Refills: 1 | Status: SHIPPED | OUTPATIENT
Start: 2022-09-28 | End: 2023-03-16 | Stop reason: SDUPTHER

## 2022-09-28 NOTE — PROGRESS NOTES
Chief Complaint  Hypothyroidism and Depression    Subjective            Selene Forrest presents to [facility]  History of Present Illness  Pt is a 6 mo f/u for hypothyroidism. Pt has c/o weight gain. Pt has not been taking levothyroxine due to losing her medication during a move. Pt has not taken this medication sine 6/2022.     Pt has also lost her Prozac and would like a refill sent to [facility].     Pt is due labs. Elevated cholesterol and platelet count last check.     Pt declines vaccines at this time and understands the risk.        Past Medical History:   Diagnosis Date   • Depression    • Hx of bone density study 08/13/2019   • Visit for screening mammogram 01/14/2019       No Known Allergies     Past Surgical History:   Procedure Laterality Date   • BREAST AUGMENTATION Bilateral 1992   • BREAST LUMPECTOMY     • DILATATION AND CURETTAGE     • HERNIA REPAIR     • NOSE SURGERY     • TUBAL ABDOMINAL LIGATION          Social History     Tobacco Use   • Smoking status: Former Smoker     Packs/day: 2.00     Years: 40.00     Pack years: 80.00   • Smokeless tobacco: Never Used   • Tobacco comment: started at 21   Substance Use Topics   • Alcohol use: Yes       Family History   Problem Relation Age of Onset   • Stroke Mother    • Hypertension Mother    • Breast cancer Mother    • Heart disease Father    • Heart disease Brother    • Stroke Maternal Grandfather    • Heart disease Paternal Grandmother    • Heart disease Paternal Grandfather    • Breast cancer Other    • Breast cancer Other    • Diabetes Other         Current Outpatient Medications on File Prior to Visit   Medication Sig   • [DISCONTINUED] FLUoxetine (PROzac) 20 MG capsule Prozac 20 mg oral capsule take 1 capsule (20 mg) by oral route once daily   Active   • [DISCONTINUED] levothyroxine (SYNTHROID, LEVOTHROID) 25 MCG tablet Take 1 tablet by mouth Daily.     No current facility-administered medications on file prior to  "visit.       There are no preventive care reminders to display for this patient.    Objective     /76   Pulse 84   Ht 162.6 cm (64\")   Wt 84.4 kg (186 lb)   SpO2 95%   BMI 31.93 kg/m²       Physical Exam  Constitutional:       General: She is not in acute distress.     Appearance: Normal appearance. She is not ill-appearing.   HENT:      Head: Normocephalic and atraumatic.      Right Ear: Tympanic membrane, ear canal and external ear normal.      Left Ear: Tympanic membrane, ear canal and external ear normal.   Neck:      Thyroid: No thyroid mass or thyroid tenderness.   Cardiovascular:      Rate and Rhythm: Normal rate and regular rhythm.      Heart sounds: Normal heart sounds. No murmur heard.  Pulmonary:      Effort: Pulmonary effort is normal. No respiratory distress.      Breath sounds: Normal breath sounds.   Chest:      Chest wall: No tenderness.   Abdominal:      General: Abdomen is flat. Bowel sounds are normal. There is no distension.      Palpations: Abdomen is soft. There is no mass.      Tenderness: There is no abdominal tenderness. There is no guarding.   Musculoskeletal:         General: No swelling or tenderness. Normal range of motion.      Cervical back: Normal range of motion and neck supple.   Skin:     General: Skin is warm and dry.      Findings: No rash.   Neurological:      General: No focal deficit present.      Mental Status: She is alert and oriented to person, place, and time. Mental status is at baseline.      Gait: Gait normal.   Psychiatric:         Attention and Perception: Attention normal.         Mood and Affect: Mood and affect normal.         Speech: Speech normal.         Behavior: Behavior normal. Behavior is cooperative.         Thought Content: Thought content normal. Thought content does not include suicidal ideation.         Judgment: Judgment normal.           Result Review :                           Assessment and Plan        Diagnoses and all orders for this " visit:    1. Hypothyroidism, unspecified type (Primary)  Comments:  will restart synthroid and recheck in 6-8 weeks  Orders:  -     TSH; Future  -     T4, free; Future  -     levothyroxine (SYNTHROID, LEVOTHROID) 25 MCG tablet; Take 1 tablet by mouth Daily.  Dispense: 30 tablet; Refill: 0    2. Elevated cholesterol with elevated triglycerides  -     Comprehensive metabolic panel; Future  -     Lipid panel; Future    3. Elevated platelet count  -     CBC w AUTO Differential; Future    4. Depression, unspecified depression type  Comments:  restart prozac 20mg  Orders:  -     FLUoxetine (PROzac) 20 MG capsule; Take 1 capsule by mouth Daily.  Dispense: 90 capsule; Refill: 1              Follow Up     No follow-ups on file.    Patient was given instructions and counseling regarding her condition or for health maintenance advice. Please see specific information pulled into the AVS if appropriate.     Selene Forrest  reports that she has quit smoking. She has a 80.00 pack-year smoking history. She has never used smokeless tobacco..

## 2022-10-11 ENCOUNTER — TELEPHONE (OUTPATIENT)
Dept: FAMILY MEDICINE CLINIC | Facility: CLINIC | Age: 67
End: 2022-10-11

## 2022-10-11 NOTE — TELEPHONE ENCOUNTER
Phoned patient to remind her of lab work advised the patient to get her lab work in the next 2 weeks.

## 2022-10-27 ENCOUNTER — TELEPHONE (OUTPATIENT)
Dept: FAMILY MEDICINE CLINIC | Facility: CLINIC | Age: 67
End: 2022-10-27

## 2022-11-04 ENCOUNTER — LAB (OUTPATIENT)
Dept: LAB | Facility: HOSPITAL | Age: 67
End: 2022-11-04

## 2022-11-04 DIAGNOSIS — E78.2 ELEVATED CHOLESTEROL WITH ELEVATED TRIGLYCERIDES: ICD-10-CM

## 2022-11-04 DIAGNOSIS — R79.89 ELEVATED PLATELET COUNT: ICD-10-CM

## 2022-11-04 DIAGNOSIS — E03.9 HYPOTHYROIDISM, UNSPECIFIED TYPE: ICD-10-CM

## 2022-11-04 LAB
ALBUMIN SERPL-MCNC: 4.7 G/DL (ref 3.5–5.2)
ALBUMIN/GLOB SERPL: 1.6 G/DL
ALP SERPL-CCNC: 75 U/L (ref 39–117)
ALT SERPL W P-5'-P-CCNC: 16 U/L (ref 1–33)
ANION GAP SERPL CALCULATED.3IONS-SCNC: 12.2 MMOL/L (ref 5–15)
AST SERPL-CCNC: 26 U/L (ref 1–32)
BASOPHILS # BLD AUTO: 0.07 10*3/MM3 (ref 0–0.2)
BASOPHILS NFR BLD AUTO: 0.7 % (ref 0–1.5)
BILIRUB SERPL-MCNC: 0.5 MG/DL (ref 0–1.2)
BUN SERPL-MCNC: 6 MG/DL (ref 8–23)
BUN/CREAT SERPL: 7.7 (ref 7–25)
CALCIUM SPEC-SCNC: 9.6 MG/DL (ref 8.6–10.5)
CHLORIDE SERPL-SCNC: 98 MMOL/L (ref 98–107)
CHOLEST SERPL-MCNC: 218 MG/DL (ref 0–200)
CO2 SERPL-SCNC: 25.8 MMOL/L (ref 22–29)
CREAT SERPL-MCNC: 0.78 MG/DL (ref 0.57–1)
DEPRECATED RDW RBC AUTO: 46.5 FL (ref 37–54)
EGFRCR SERPLBLD CKD-EPI 2021: 83.9 ML/MIN/1.73
EOSINOPHIL # BLD AUTO: 0.52 10*3/MM3 (ref 0–0.4)
EOSINOPHIL NFR BLD AUTO: 5.1 % (ref 0.3–6.2)
ERYTHROCYTE [DISTWIDTH] IN BLOOD BY AUTOMATED COUNT: 13.3 % (ref 12.3–15.4)
GLOBULIN UR ELPH-MCNC: 3 GM/DL
GLUCOSE SERPL-MCNC: 100 MG/DL (ref 65–99)
HCT VFR BLD AUTO: 47.6 % (ref 34–46.6)
HDLC SERPL-MCNC: 81 MG/DL (ref 40–60)
HGB BLD-MCNC: 16.1 G/DL (ref 12–15.9)
IMM GRANULOCYTES # BLD AUTO: 0.02 10*3/MM3 (ref 0–0.05)
IMM GRANULOCYTES NFR BLD AUTO: 0.2 % (ref 0–0.5)
LDLC SERPL CALC-MCNC: 124 MG/DL (ref 0–100)
LDLC/HDLC SERPL: 1.5 {RATIO}
LYMPHOCYTES # BLD AUTO: 4.11 10*3/MM3 (ref 0.7–3.1)
LYMPHOCYTES NFR BLD AUTO: 40.7 % (ref 19.6–45.3)
MCH RBC QN AUTO: 32.3 PG (ref 26.6–33)
MCHC RBC AUTO-ENTMCNC: 33.8 G/DL (ref 31.5–35.7)
MCV RBC AUTO: 95.6 FL (ref 79–97)
MONOCYTES # BLD AUTO: 0.87 10*3/MM3 (ref 0.1–0.9)
MONOCYTES NFR BLD AUTO: 8.6 % (ref 5–12)
NEUTROPHILS NFR BLD AUTO: 4.52 10*3/MM3 (ref 1.7–7)
NEUTROPHILS NFR BLD AUTO: 44.7 % (ref 42.7–76)
NRBC BLD AUTO-RTO: 0 /100 WBC (ref 0–0.2)
PLATELET # BLD AUTO: 382 10*3/MM3 (ref 140–450)
PMV BLD AUTO: 9.6 FL (ref 6–12)
POTASSIUM SERPL-SCNC: 4.4 MMOL/L (ref 3.5–5.2)
PROT SERPL-MCNC: 7.7 G/DL (ref 6–8.5)
RBC # BLD AUTO: 4.98 10*6/MM3 (ref 3.77–5.28)
SODIUM SERPL-SCNC: 136 MMOL/L (ref 136–145)
T4 FREE SERPL-MCNC: 1.17 NG/DL (ref 0.93–1.7)
TRIGL SERPL-MCNC: 77 MG/DL (ref 0–150)
TSH SERPL DL<=0.05 MIU/L-ACNC: 3.3 UIU/ML (ref 0.27–4.2)
VLDLC SERPL-MCNC: 13 MG/DL (ref 5–40)
WBC NRBC COR # BLD: 10.11 10*3/MM3 (ref 3.4–10.8)

## 2022-11-04 PROCEDURE — 36415 COLL VENOUS BLD VENIPUNCTURE: CPT

## 2022-11-04 PROCEDURE — 80061 LIPID PANEL: CPT

## 2022-11-04 PROCEDURE — 85025 COMPLETE CBC W/AUTO DIFF WBC: CPT

## 2022-11-04 PROCEDURE — 84439 ASSAY OF FREE THYROXINE: CPT

## 2022-11-04 PROCEDURE — 80053 COMPREHEN METABOLIC PANEL: CPT

## 2022-11-04 PROCEDURE — 84443 ASSAY THYROID STIM HORMONE: CPT

## 2023-03-16 ENCOUNTER — LAB (OUTPATIENT)
Dept: LAB | Facility: HOSPITAL | Age: 68
End: 2023-03-16
Payer: MEDICARE

## 2023-03-16 ENCOUNTER — OFFICE VISIT (OUTPATIENT)
Dept: FAMILY MEDICINE CLINIC | Facility: CLINIC | Age: 68
End: 2023-03-16
Payer: MEDICARE

## 2023-03-16 VITALS
OXYGEN SATURATION: 96 % | DIASTOLIC BLOOD PRESSURE: 64 MMHG | SYSTOLIC BLOOD PRESSURE: 150 MMHG | HEART RATE: 76 BPM | HEIGHT: 64 IN | WEIGHT: 186 LBS | BODY MASS INDEX: 31.76 KG/M2

## 2023-03-16 DIAGNOSIS — F32.A DEPRESSION, UNSPECIFIED DEPRESSION TYPE: Primary | ICD-10-CM

## 2023-03-16 DIAGNOSIS — E03.9 HYPOTHYROIDISM, UNSPECIFIED TYPE: ICD-10-CM

## 2023-03-16 DIAGNOSIS — K46.9 ABDOMINAL HERNIA WITHOUT OBSTRUCTION AND WITHOUT GANGRENE, RECURRENCE NOT SPECIFIED, UNSPECIFIED HERNIA TYPE: ICD-10-CM

## 2023-03-16 DIAGNOSIS — Z78.0 MENOPAUSE: ICD-10-CM

## 2023-03-16 DIAGNOSIS — Z12.31 ENCOUNTER FOR SCREENING MAMMOGRAM FOR MALIGNANT NEOPLASM OF BREAST: ICD-10-CM

## 2023-03-16 LAB
T4 FREE SERPL-MCNC: 1.07 NG/DL (ref 0.93–1.7)
TSH SERPL DL<=0.05 MIU/L-ACNC: 3.13 UIU/ML (ref 0.27–4.2)

## 2023-03-16 PROCEDURE — 99214 OFFICE O/P EST MOD 30 MIN: CPT | Performed by: NURSE PRACTITIONER

## 2023-03-16 PROCEDURE — 36415 COLL VENOUS BLD VENIPUNCTURE: CPT

## 2023-03-16 PROCEDURE — 1160F RVW MEDS BY RX/DR IN RCRD: CPT | Performed by: NURSE PRACTITIONER

## 2023-03-16 PROCEDURE — 84443 ASSAY THYROID STIM HORMONE: CPT

## 2023-03-16 PROCEDURE — 84439 ASSAY OF FREE THYROXINE: CPT

## 2023-03-16 PROCEDURE — 1159F MED LIST DOCD IN RCRD: CPT | Performed by: NURSE PRACTITIONER

## 2023-03-16 RX ORDER — FLUOXETINE HYDROCHLORIDE 20 MG/1
20 CAPSULE ORAL DAILY
Qty: 90 CAPSULE | Refills: 1 | Status: SHIPPED | OUTPATIENT
Start: 2023-03-16

## 2023-03-16 RX ORDER — LEVOTHYROXINE SODIUM 0.03 MG/1
25 TABLET ORAL DAILY
Qty: 90 TABLET | Refills: 1 | Status: SHIPPED | OUTPATIENT
Start: 2023-03-16

## 2023-03-16 RX ORDER — FLUOXETINE HYDROCHLORIDE 20 MG/1
20 CAPSULE ORAL DAILY
Qty: 90 CAPSULE | Refills: 1 | Status: SHIPPED | OUTPATIENT
Start: 2023-03-16 | End: 2023-03-16

## 2023-03-16 RX ORDER — LEVOTHYROXINE SODIUM 0.03 MG/1
25 TABLET ORAL DAILY
Qty: 30 TABLET | Refills: 0 | Status: SHIPPED | OUTPATIENT
Start: 2023-03-16 | End: 2023-03-16

## 2023-03-16 NOTE — PROGRESS NOTES
Chief Complaint  Depression, Hypothyroidism, and Mass    Subjective            Selene Forrest presents to [facility]  History of Present Illness  Pt is a 6 mo f/u for depression and hypothyroidism. Pt has c/o possible hernia. Pt has a large lump at the upper mid abdomen area. Pt states this has grown in size over the last month. Pt describes this as hard. Pt has had a hernia repaired previously in the same area. Pt states there is some pain, but most of the time there is no pain.     Pt is due mammogram and dexa. This will be due 8/2023    Pt is due MAW. Pt is scheduled 3/23/23.       PT is due labs         Past Medical History:   Diagnosis Date   • Depression    • Hx of bone density study 08/13/2019   • Visit for screening mammogram 01/14/2019       No Known Allergies     Past Surgical History:   Procedure Laterality Date   • BREAST AUGMENTATION Bilateral 1992   • BREAST LUMPECTOMY     • DILATATION AND CURETTAGE     • HERNIA REPAIR     • NOSE SURGERY     • TUBAL ABDOMINAL LIGATION          Social History     Tobacco Use   • Smoking status: Former     Packs/day: 2.00     Years: 40.00     Pack years: 80.00     Types: Cigarettes   • Smokeless tobacco: Never   • Tobacco comments:     started at 21   Substance Use Topics   • Alcohol use: Yes       Family History   Problem Relation Age of Onset   • Stroke Mother    • Hypertension Mother    • Breast cancer Mother    • Heart disease Father    • Heart disease Brother    • Stroke Maternal Grandfather    • Heart disease Paternal Grandmother    • Heart disease Paternal Grandfather    • Breast cancer Other    • Breast cancer Other    • Diabetes Other         Current Outpatient Medications on File Prior to Visit   Medication Sig   • [DISCONTINUED] FLUoxetine (PROzac) 20 MG capsule Take 1 capsule by mouth Daily.   • [DISCONTINUED] levothyroxine (SYNTHROID, LEVOTHROID) 25 MCG tablet Take 1 tablet by mouth Daily.     No current  "facility-administered medications on file prior to visit.       Health Maintenance Due   Topic Date Due   • ANNUAL WELLNESS VISIT  03/14/2023       Objective     /64   Pulse 76   Ht 162.6 cm (64\")   Wt 84.4 kg (186 lb)   SpO2 96%   BMI 31.93 kg/m²       Physical Exam  Constitutional:       General: She is not in acute distress.     Appearance: Normal appearance. She is not ill-appearing.   HENT:      Head: Normocephalic and atraumatic.   Neck:      Thyroid: No thyroid mass, thyromegaly or thyroid tenderness.   Cardiovascular:      Rate and Rhythm: Normal rate and regular rhythm.      Heart sounds: Normal heart sounds. No murmur heard.  Pulmonary:      Effort: Pulmonary effort is normal. No respiratory distress.      Breath sounds: Normal breath sounds.   Chest:      Chest wall: No tenderness.   Abdominal:      General: Abdomen is flat. Bowel sounds are normal. There is no distension.      Palpations: Abdomen is soft. There is no mass.      Tenderness: There is no abdominal tenderness. There is no guarding.      Hernia: A hernia is present.      Comments: Large grapfruit size hernia to mid abdomen, nontender   Musculoskeletal:         General: No swelling or tenderness. Normal range of motion.      Cervical back: Normal range of motion and neck supple.   Skin:     General: Skin is warm and dry.      Findings: No rash.   Neurological:      General: No focal deficit present.      Mental Status: She is alert and oriented to person, place, and time. Mental status is at baseline.      Gait: Gait normal.   Psychiatric:         Mood and Affect: Mood normal.         Behavior: Behavior normal.         Thought Content: Thought content normal.         Judgment: Judgment normal.           Result Review :                           Assessment and Plan        Diagnoses and all orders for this visit:    1. Depression, unspecified depression type (Primary)  Comments:  stable on prozac 20mg, continu  Orders:  -     " Discontinue: FLUoxetine (PROzac) 20 MG capsule; Take 1 capsule by mouth Daily.  Dispense: 90 capsule; Refill: 1  -     FLUoxetine (PROzac) 20 MG capsule; Take 1 capsule by mouth Daily.  Dispense: 90 capsule; Refill: 1    2. Hypothyroidism, unspecified type  Comments:  stable on synthroid 25mcg, continue  Orders:  -     Discontinue: levothyroxine (SYNTHROID, LEVOTHROID) 25 MCG tablet; Take 1 tablet by mouth Daily.  Dispense: 30 tablet; Refill: 0  -     TSH; Future  -     T4, free; Future  -     levothyroxine (SYNTHROID, LEVOTHROID) 25 MCG tablet; Take 1 tablet by mouth Daily.  Dispense: 90 tablet; Refill: 1    3. Menopause  -     DEXA Bone Density Axial    4. Encounter for screening mammogram for malignant neoplasm of breast  -     Mammo Screening Digital Tomosynthesis Bilateral With CAD; Future    5. Abdominal hernia without obstruction and without gangrene, recurrence not specified, unspecified hernia type  -     Ambulatory Referral to General Surgery              Follow Up     Return in about 6 months (around 9/16/2023).    Patient was given instructions and counseling regarding her condition or for health maintenance advice. Please see specific information pulled into the AVS if appropriate.     Selene Forrest  reports that she has quit smoking. Her smoking use included cigarettes. She has a 80.00 pack-year smoking history. She has never used smokeless tobacco..

## 2023-03-23 ENCOUNTER — OFFICE VISIT (OUTPATIENT)
Dept: FAMILY MEDICINE CLINIC | Facility: CLINIC | Age: 68
End: 2023-03-23
Payer: MEDICARE

## 2023-03-23 VITALS
HEART RATE: 84 BPM | SYSTOLIC BLOOD PRESSURE: 133 MMHG | HEIGHT: 64 IN | WEIGHT: 188 LBS | BODY MASS INDEX: 32.1 KG/M2 | OXYGEN SATURATION: 95 % | DIASTOLIC BLOOD PRESSURE: 55 MMHG

## 2023-03-23 DIAGNOSIS — Z00.00 MEDICARE ANNUAL WELLNESS VISIT, SUBSEQUENT: Primary | ICD-10-CM

## 2023-03-23 NOTE — PROGRESS NOTES
"The ABCs of the Annual Wellness Visit  Subsequent Medicare Wellness Visit    Subjective      Selene Forrest is a 67 y.o. female who presents for a Subsequent Medicare Wellness Visit.    The following portions of the patient's history were reviewed and   updated as appropriate: allergies, current medications, past family history, past medical history, past social history, past surgical history and problem list.    Compared to one year ago, the patient feels her physical   health is the same.    Compared to one year ago, the patient feels her mental   health is the same.    Recent Hospitalizations:  She was not admitted to the hospital during the last year.       Current Medical Providers:  Patient Care Team:  Gwen Chavez APRN as PCP - General (Nurse Practitioner)    Outpatient Medications Prior to Visit   Medication Sig Dispense Refill   • FLUoxetine (PROzac) 20 MG capsule Take 1 capsule by mouth Daily. 90 capsule 1   • levothyroxine (SYNTHROID, LEVOTHROID) 25 MCG tablet Take 1 tablet by mouth Daily. 90 tablet 1     No facility-administered medications prior to visit.       No opioid medication identified on active medication list. I have reviewed chart for other potential  high risk medication/s and harmful drug interactions in the elderly.          Aspirin is not on active medication list.  Aspirin use is not indicated based on review of current medical condition/s. Risk of harm outweighs potential benefits.  .    Patient Active Problem List   Diagnosis   • Abnormal bone density screening   • Depression     Advance Care Planning  Advance Directive is not on file.  ACP discussion was held with the patient during this visit. Patient does not have an advance directive, declines further assistance.     Objective    Vitals:    03/23/23 1319   BP: 133/55   Pulse: 84   SpO2: 95%   Weight: 85.3 kg (188 lb)   Height: 162.6 cm (64\")   PainSc: 0-No pain     Estimated body mass index is 32.27 kg/m² as calculated from " "the following:    Height as of this encounter: 162.6 cm (64\").    Weight as of this encounter: 85.3 kg (188 lb).    BMI is >= 30 and <35. (Class 1 Obesity). The following options were offered after discussion;: exercise counseling/recommendations and nutrition counseling/recommendations      Does the patient have evidence of cognitive impairment?   No            HEALTH RISK ASSESSMENT    Smoking Status:  Social History     Tobacco Use   Smoking Status Former   • Packs/day: 2.00   • Years: 40.00   • Pack years: 80.00   • Types: Cigarettes   Smokeless Tobacco Never   Tobacco Comments    started at 21     Alcohol Consumption:  Social History     Substance and Sexual Activity   Alcohol Use Yes     Fall Risk Screen:    STEADI Fall Risk Assessment was completed, and patient is at LOW risk for falls.Assessment completed on:3/23/2023    Depression Screening:  PHQ-2/PHQ-9 Depression Screening 3/23/2023   Little Interest or Pleasure in Doing Things 0-->not at all   Feeling Down, Depressed or Hopeless 0-->not at all   PHQ-9: Brief Depression Severity Measure Score 0       Health Habits and Functional and Cognitive Screening:  Functional & Cognitive Status 3/23/2023   Do you have difficulty preparing food and eating? No   Do you have difficulty bathing yourself, getting dressed or grooming yourself? Yes   Do you have difficulty using the toilet? No   Do you have difficulty moving around from place to place? No   Do you have trouble with steps or getting out of a bed or a chair? No   Current Diet Well Balanced Diet   Dental Exam Up to date   Eye Exam Up to date   Exercise (times per week) 0 times per week   Current Exercises Include No Regular Exercise   Do you need help using the phone?  No   Are you deaf or do you have serious difficulty hearing?  No   Do you need help with transportation? No   Do you need help shopping? No   Do you need help preparing meals?  No   Do you need help with housework?  No   Do you need help with " laundry? No   Do you need help taking your medications? No   Do you need help managing money? No   Do you ever drive or ride in a car without wearing a seat belt? No   Have you felt unusual stress, anger or loneliness in the last month? No   Who do you live with? Alone   If you need help, do you have trouble finding someone available to you? No   Have you been bothered in the last four weeks by sexual problems? No   Do you have difficulty concentrating, remembering or making decisions? No       Age-appropriate Screening Schedule:  Refer to the list below for future screening recommendations based on patient's age, sex and/or medical conditions. Orders for these recommended tests are listed in the plan section. The patient has been provided with a written plan.    Health Maintenance   Topic Date Due   • ZOSTER VACCINE (1 of 2) 03/23/2023 (Originally 10/27/2006)   • COVID-19 Vaccine (1) 03/25/2023 (Originally 4/27/1956)   • INFLUENZA VACCINE  03/31/2023 (Originally 8/1/2022)   • Pneumococcal Vaccine 65+ (1 - PCV) 09/28/2023 (Originally 10/27/1962)   • TDAP/TD VACCINES (1 - Tdap) 03/23/2024 (Originally 10/27/1975)   • DXA SCAN  08/03/2023   • LIPID PANEL  11/04/2023   • ANNUAL WELLNESS VISIT  03/23/2024   • MAMMOGRAM  08/05/2024   • COLORECTAL CANCER SCREENING  03/16/2026   • HEPATITIS C SCREENING  Completed                CMS Preventative Services Quick Reference  Risk Factors Identified During Encounter:    None Identified    The above risks/problems have been discussed with the patient.  Pertinent information has been shared with the patient in the After Visit Summary.    Diagnoses and all orders for this visit:    1. Medicare annual wellness visit, subsequent (Primary)        Follow Up:   Next Medicare Wellness visit to be scheduled in 1 year.      An After Visit Summary and PPPS were made available to the patient.

## 2023-03-28 ENCOUNTER — OFFICE VISIT (OUTPATIENT)
Dept: SURGERY | Facility: CLINIC | Age: 68
End: 2023-03-28
Payer: MEDICARE

## 2023-03-28 ENCOUNTER — PREP FOR SURGERY (OUTPATIENT)
Dept: OTHER | Facility: HOSPITAL | Age: 68
End: 2023-03-28
Payer: MEDICARE

## 2023-03-28 VITALS — WEIGHT: 196 LBS | HEIGHT: 64 IN | BODY MASS INDEX: 33.46 KG/M2 | RESPIRATION RATE: 14 BRPM

## 2023-03-28 DIAGNOSIS — K43.2 INCISIONAL HERNIA, WITHOUT OBSTRUCTION OR GANGRENE: Primary | ICD-10-CM

## 2023-03-28 PROCEDURE — 1159F MED LIST DOCD IN RCRD: CPT | Performed by: SURGERY

## 2023-03-28 PROCEDURE — 1160F RVW MEDS BY RX/DR IN RCRD: CPT | Performed by: SURGERY

## 2023-03-28 PROCEDURE — 99203 OFFICE O/P NEW LOW 30 MIN: CPT | Performed by: SURGERY

## 2023-03-28 RX ORDER — SODIUM CHLORIDE, SODIUM LACTATE, POTASSIUM CHLORIDE, CALCIUM CHLORIDE 600; 310; 30; 20 MG/100ML; MG/100ML; MG/100ML; MG/100ML
70 INJECTION, SOLUTION INTRAVENOUS CONTINUOUS
OUTPATIENT
Start: 2023-03-28

## 2023-03-28 RX ORDER — ONDANSETRON 2 MG/ML
4 INJECTION INTRAMUSCULAR; INTRAVENOUS EVERY 6 HOURS PRN
OUTPATIENT
Start: 2023-03-28

## 2023-03-28 RX ORDER — CEFAZOLIN SODIUM 2 G/100ML
2 INJECTION, SOLUTION INTRAVENOUS ONCE
OUTPATIENT
Start: 2023-03-28 | End: 2023-03-28

## 2023-03-28 NOTE — PROGRESS NOTES
"Inpatient History and Physical Surgical Orders    Preadmission Location:   Preadmission Time:  Facility:  Surgery Date:  Surgery Time:  Preadmission Test date:     Chief Complaint  Outpatient History and Physical / Surgical Orders    Primary Care Provider: Gwen Chavez APRN    Referring Provider: Gwen Chavez APRN    Subjective      Patient Name: Selene Forrest : 1955    HPI  The patient is a 67-year-old female who was referred for evaluation of an abdominal wall hernia.  She had an open hernia repair in the epigastrium back in the early .  She has since developed a recurrent hernia there and its gotten a little bit larger recently.    Past History:  Medical History: has a past medical history of Abdominal hernia, Depression, bone density study (2019), and Visit for screening mammogram (2019).   Surgical History: has a past surgical history that includes Breast Augmentation (Bilateral, ); Dilation and curettage of uterus; Hernia repair; Breast lumpectomy; Nose surgery; and Tubal ligation.   Family History: family history includes Breast cancer in her mother and other family members; Diabetes in an other family member; Heart disease in her brother, father, paternal grandfather, and paternal grandmother; Hypertension in her mother; Stroke in her maternal grandfather and mother.   Social History: reports that she has quit smoking. Her smoking use included cigarettes. She has a 80.00 pack-year smoking history. She has never used smokeless tobacco. She reports current alcohol use. She reports that she does not use drugs.  Allergies: Patient has no known allergies.       Current Outpatient Medications:   •  FLUoxetine (PROzac) 20 MG capsule, Take 1 capsule by mouth Daily., Disp: 90 capsule, Rfl: 1  •  levothyroxine (SYNTHROID, LEVOTHROID) 25 MCG tablet, Take 1 tablet by mouth Daily., Disp: 90 tablet, Rfl: 1       Objective   Vital Signs:   Resp 14   Ht 162.6 cm (64.02\")   Wt 88.9 " kg (196 lb)   BMI 33.63 kg/m²       Physical Exam  Vitals and nursing note reviewed.   Constitutional:       Appearance: Normal appearance. The patient is well-developed.   Cardiovascular:      Rate and Rhythm: Normal rate and regular rhythm.   Pulmonary:      Effort: Pulmonary effort is normal.      Breath sounds: Normal air entry.   Abdominal:      General: Bowel sounds are normal.      Palpations: Abdomen is soft.  Upper abdominal incisional hernia noted     Skin:     General: Skin is warm and dry.   Neurological:      Mental Status: The patient is alert and oriented to person, place, and time.      Motor: Motor function is intact.   Psychiatric:         Mood and Affect: Mood normal.       Result Review :               Assessment and Plan   Diagnoses and all orders for this visit:    1. Incisional hernia, without obstruction or gangrene (Primary)    We will schedule her for a robotic incisional hernia repair.  I have described the procedure to her as well as the risk and benefits and she is agreeable to proceeding.    I  Dangelo Rios MD  03/28/2023

## 2023-03-29 ENCOUNTER — PATIENT ROUNDING (BHMG ONLY) (OUTPATIENT)
Dept: SURGERY | Facility: CLINIC | Age: 68
End: 2023-03-29
Payer: MEDICARE

## 2023-03-29 NOTE — PROGRESS NOTES
March 29, 2023    Hello, may I speak with Selene Forrest?    My name is Yvette Bullock      I am  with [facility]  [facility]  [address]  [place] [address] [phone].    Before we get started may I verify your date of birth? 1955    I am calling to officially welcome you to our practice and ask about your recent visit. Is this a good time to talk? yes    Tell me about your visit with us. What things went well?  Patient stated the appointment went very well. She was very impressed with our office. She stated everyone was really nice and Dr. Rios answered all her questions and was very knowledgeable.        We're always looking for ways to make our patients' experiences even better. Do you have recommendations on ways we may improve?  no    Overall were you satisfied with your first visit to our practice? yes       I appreciate you taking the time to speak with me today. Is there anything else I can do for you? no      Thank you, and have a great day.

## 2023-04-17 NOTE — PRE-PROCEDURE INSTRUCTIONS
PATIENT INSTRUCTED TO BE:    - NPO AFTER MIDNIGHT EXCEPT CAN HAVE CLEAR LIQUIDS 2 HOURS PRIOR TO SURGERY ARRIVAL TIME     - TO HOLD ALL VITAMINS, SUPPLEMENTS, NSAIDS FOR ONE WEEK PRIOR TO THEIR SURGICAL PROCEDURE    - INSTRUCTED PT TO USE SURGICAL SOAP 1 TIME THE NIGHT PRIOR TO SURGERY OR THE AM OF SURGERY.   USE SOAP FROM NECK TO TOES AVOID THEIR FACE, HAIR, AND PRIVATE PARTS. INSTRUCTED NO LOTIONS, JEWELRY, PIERCINGS, OR DEODORANT DAY OF SURGERY    - IF DIABETIC, CHECK BLOOD GLUCOSE IF LESS THAN 70 CALL PREOP AREA -AM OF SURGERY     - INSTRUCTED TO TAKE THE FOLLOWING MEDICATIONS THE DAY OF SURGERY:          PROZAC, LEVOTHYROID    PATIENT VERBALIZED UNDERSTANDING

## 2023-04-19 ENCOUNTER — ANESTHESIA EVENT (OUTPATIENT)
Dept: PERIOP | Facility: HOSPITAL | Age: 68
End: 2023-04-19
Payer: MEDICARE

## 2023-04-19 ENCOUNTER — ANESTHESIA (OUTPATIENT)
Dept: PERIOP | Facility: HOSPITAL | Age: 68
End: 2023-04-19
Payer: MEDICARE

## 2023-04-19 ENCOUNTER — HOSPITAL ENCOUNTER (OUTPATIENT)
Facility: HOSPITAL | Age: 68
Setting detail: HOSPITAL OUTPATIENT SURGERY
Discharge: HOME | End: 2023-04-19
Attending: SURGERY | Admitting: SURGERY
Payer: MEDICARE

## 2023-04-19 VITALS
DIASTOLIC BLOOD PRESSURE: 67 MMHG | HEIGHT: 64 IN | WEIGHT: 186.29 LBS | BODY MASS INDEX: 31.8 KG/M2 | RESPIRATION RATE: 72 BRPM | OXYGEN SATURATION: 99 % | HEART RATE: 74 BPM | TEMPERATURE: 97.8 F | SYSTOLIC BLOOD PRESSURE: 115 MMHG

## 2023-04-19 DIAGNOSIS — K43.2 INCISIONAL HERNIA, WITHOUT OBSTRUCTION OR GANGRENE: ICD-10-CM

## 2023-04-19 LAB — QT INTERVAL: 396 MS

## 2023-04-19 PROCEDURE — 25010000002 ONDANSETRON PER 1 MG: Performed by: SURGERY

## 2023-04-19 PROCEDURE — 25010000002 HYDROMORPHONE 1 MG/ML SOLUTION: Performed by: ANESTHESIOLOGY

## 2023-04-19 PROCEDURE — 25010000002 PROPOFOL 10 MG/ML EMULSION: Performed by: ANESTHESIOLOGY

## 2023-04-19 PROCEDURE — 93005 ELECTROCARDIOGRAM TRACING: CPT | Performed by: ANESTHESIOLOGY

## 2023-04-19 PROCEDURE — 25010000002 FENTANYL CITRATE (PF) 50 MCG/ML SOLUTION: Performed by: ANESTHESIOLOGY

## 2023-04-19 PROCEDURE — 25010000002 CEFAZOLIN IN DEXTROSE 2-4 GM/100ML-% SOLUTION: Performed by: SURGERY

## 2023-04-19 PROCEDURE — 25010000002 MIDAZOLAM PER 1MG: Performed by: ANESTHESIOLOGY

## 2023-04-19 PROCEDURE — 25010000002 KETOROLAC TROMETHAMINE PER 15 MG: Performed by: ANESTHESIOLOGY

## 2023-04-19 PROCEDURE — C1781 MESH (IMPLANTABLE): HCPCS | Performed by: SURGERY

## 2023-04-19 PROCEDURE — 25010000002 NEOSTIGMINE 10 MG/10ML SOLUTION: Performed by: ANESTHESIOLOGY

## 2023-04-19 PROCEDURE — 25010000002 DEXAMETHASONE PER 1 MG: Performed by: ANESTHESIOLOGY

## 2023-04-19 DEVICE — ABSORBABLE WOUND CLOSURE DEVICE
Type: IMPLANTABLE DEVICE | Site: ABDOMEN | Status: FUNCTIONAL
Brand: V-LOC 180

## 2023-04-19 DEVICE — VENTRALIGHT ST MESH WITH ECHO PS POSITONING SYSTEM
Type: IMPLANTABLE DEVICE | Site: ABDOMEN | Status: FUNCTIONAL
Brand: VENTRALIGHT ST MESH WITH ECHO PS POSITONING SYSTEM

## 2023-04-19 RX ORDER — LABETALOL HYDROCHLORIDE 5 MG/ML
INJECTION, SOLUTION INTRAVENOUS AS NEEDED
Status: DISCONTINUED | OUTPATIENT
Start: 2023-04-19 | End: 2023-04-19 | Stop reason: SURG

## 2023-04-19 RX ORDER — HYDROCODONE BITARTRATE AND ACETAMINOPHEN 5; 325 MG/1; MG/1
1 TABLET ORAL ONCE AS NEEDED
Status: DISCONTINUED | OUTPATIENT
Start: 2023-04-19 | End: 2023-04-19 | Stop reason: HOSPADM

## 2023-04-19 RX ORDER — SODIUM CHLORIDE 0.9 % (FLUSH) 0.9 %
10 SYRINGE (ML) INJECTION EVERY 12 HOURS SCHEDULED
Status: DISCONTINUED | OUTPATIENT
Start: 2023-04-19 | End: 2023-04-19 | Stop reason: HOSPADM

## 2023-04-19 RX ORDER — ONDANSETRON 2 MG/ML
4 INJECTION INTRAMUSCULAR; INTRAVENOUS ONCE AS NEEDED
Status: DISCONTINUED | OUTPATIENT
Start: 2023-04-19 | End: 2023-04-19 | Stop reason: HOSPADM

## 2023-04-19 RX ORDER — SODIUM CHLORIDE 9 MG/ML
40 INJECTION, SOLUTION INTRAVENOUS AS NEEDED
Status: DISCONTINUED | OUTPATIENT
Start: 2023-04-19 | End: 2023-04-19 | Stop reason: HOSPADM

## 2023-04-19 RX ORDER — PROMETHAZINE HYDROCHLORIDE 25 MG/1
25 SUPPOSITORY RECTAL ONCE AS NEEDED
Status: DISCONTINUED | OUTPATIENT
Start: 2023-04-19 | End: 2023-04-19 | Stop reason: HOSPADM

## 2023-04-19 RX ORDER — BUPIVACAINE HYDROCHLORIDE AND EPINEPHRINE 2.5; 5 MG/ML; UG/ML
INJECTION, SOLUTION EPIDURAL; INFILTRATION; INTRACAUDAL; PERINEURAL AS NEEDED
Status: DISCONTINUED | OUTPATIENT
Start: 2023-04-19 | End: 2023-04-19 | Stop reason: HOSPADM

## 2023-04-19 RX ORDER — SODIUM CHLORIDE, SODIUM LACTATE, POTASSIUM CHLORIDE, CALCIUM CHLORIDE 600; 310; 30; 20 MG/100ML; MG/100ML; MG/100ML; MG/100ML
9 INJECTION, SOLUTION INTRAVENOUS CONTINUOUS PRN
Status: DISCONTINUED | OUTPATIENT
Start: 2023-04-19 | End: 2023-04-19 | Stop reason: HOSPADM

## 2023-04-19 RX ORDER — ROCURONIUM BROMIDE 10 MG/ML
INJECTION, SOLUTION INTRAVENOUS AS NEEDED
Status: DISCONTINUED | OUTPATIENT
Start: 2023-04-19 | End: 2023-04-19 | Stop reason: SURG

## 2023-04-19 RX ORDER — IBUPROFEN 600 MG/1
600 TABLET ORAL EVERY 6 HOURS PRN
Status: DISCONTINUED | OUTPATIENT
Start: 2023-04-19 | End: 2023-04-19 | Stop reason: HOSPADM

## 2023-04-19 RX ORDER — NEOSTIGMINE METHYLSULFATE 1 MG/ML
INJECTION, SOLUTION INTRAVENOUS AS NEEDED
Status: DISCONTINUED | OUTPATIENT
Start: 2023-04-19 | End: 2023-04-19 | Stop reason: SURG

## 2023-04-19 RX ORDER — KETOROLAC TROMETHAMINE 30 MG/ML
INJECTION, SOLUTION INTRAMUSCULAR; INTRAVENOUS AS NEEDED
Status: DISCONTINUED | OUTPATIENT
Start: 2023-04-19 | End: 2023-04-19 | Stop reason: SURG

## 2023-04-19 RX ORDER — MIDAZOLAM HYDROCHLORIDE 2 MG/2ML
2 INJECTION, SOLUTION INTRAMUSCULAR; INTRAVENOUS
Status: DISCONTINUED | OUTPATIENT
Start: 2023-04-19 | End: 2023-04-19 | Stop reason: HOSPADM

## 2023-04-19 RX ORDER — GLYCOPYRROLATE 0.2 MG/ML
INJECTION INTRAMUSCULAR; INTRAVENOUS AS NEEDED
Status: DISCONTINUED | OUTPATIENT
Start: 2023-04-19 | End: 2023-04-19 | Stop reason: SURG

## 2023-04-19 RX ORDER — FENTANYL CITRATE 50 UG/ML
INJECTION, SOLUTION INTRAMUSCULAR; INTRAVENOUS AS NEEDED
Status: DISCONTINUED | OUTPATIENT
Start: 2023-04-19 | End: 2023-04-19 | Stop reason: SURG

## 2023-04-19 RX ORDER — DEXAMETHASONE SODIUM PHOSPHATE 4 MG/ML
INJECTION, SOLUTION INTRA-ARTICULAR; INTRALESIONAL; INTRAMUSCULAR; INTRAVENOUS; SOFT TISSUE AS NEEDED
Status: DISCONTINUED | OUTPATIENT
Start: 2023-04-19 | End: 2023-04-19 | Stop reason: SURG

## 2023-04-19 RX ORDER — HYDROCODONE BITARTRATE AND ACETAMINOPHEN 5; 325 MG/1; MG/1
1-2 TABLET ORAL EVERY 4 HOURS PRN
Qty: 10 TABLET | Refills: 0 | Status: SHIPPED | OUTPATIENT
Start: 2023-04-19

## 2023-04-19 RX ORDER — SODIUM CHLORIDE, SODIUM LACTATE, POTASSIUM CHLORIDE, CALCIUM CHLORIDE 600; 310; 30; 20 MG/100ML; MG/100ML; MG/100ML; MG/100ML
70 INJECTION, SOLUTION INTRAVENOUS CONTINUOUS
Status: DISCONTINUED | OUTPATIENT
Start: 2023-04-19 | End: 2023-04-19 | Stop reason: HOSPADM

## 2023-04-19 RX ORDER — IPRATROPIUM BROMIDE AND ALBUTEROL SULFATE 2.5; .5 MG/3ML; MG/3ML
3 SOLUTION RESPIRATORY (INHALATION) ONCE
Status: DISCONTINUED | OUTPATIENT
Start: 2023-04-19 | End: 2023-04-19 | Stop reason: HOSPADM

## 2023-04-19 RX ORDER — PROPOFOL 10 MG/ML
VIAL (ML) INTRAVENOUS AS NEEDED
Status: DISCONTINUED | OUTPATIENT
Start: 2023-04-19 | End: 2023-04-19 | Stop reason: SURG

## 2023-04-19 RX ORDER — CEFAZOLIN SODIUM 2 G/100ML
2 INJECTION, SOLUTION INTRAVENOUS ONCE
Status: COMPLETED | OUTPATIENT
Start: 2023-04-19 | End: 2023-04-19

## 2023-04-19 RX ORDER — LIDOCAINE HYDROCHLORIDE 20 MG/ML
INJECTION, SOLUTION EPIDURAL; INFILTRATION; INTRACAUDAL; PERINEURAL AS NEEDED
Status: DISCONTINUED | OUTPATIENT
Start: 2023-04-19 | End: 2023-04-19 | Stop reason: SURG

## 2023-04-19 RX ORDER — MEPERIDINE HYDROCHLORIDE 25 MG/ML
12.5 INJECTION INTRAMUSCULAR; INTRAVENOUS; SUBCUTANEOUS
Status: DISCONTINUED | OUTPATIENT
Start: 2023-04-19 | End: 2023-04-19 | Stop reason: HOSPADM

## 2023-04-19 RX ORDER — SODIUM CHLORIDE 0.9 % (FLUSH) 0.9 %
10 SYRINGE (ML) INJECTION AS NEEDED
Status: DISCONTINUED | OUTPATIENT
Start: 2023-04-19 | End: 2023-04-19 | Stop reason: HOSPADM

## 2023-04-19 RX ORDER — ONDANSETRON 2 MG/ML
4 INJECTION INTRAMUSCULAR; INTRAVENOUS EVERY 6 HOURS PRN
Status: DISCONTINUED | OUTPATIENT
Start: 2023-04-19 | End: 2023-04-19 | Stop reason: HOSPADM

## 2023-04-19 RX ORDER — ACETAMINOPHEN 500 MG
1000 TABLET ORAL ONCE
Status: COMPLETED | OUTPATIENT
Start: 2023-04-19 | End: 2023-04-19

## 2023-04-19 RX ORDER — OXYCODONE HYDROCHLORIDE 5 MG/1
5 TABLET ORAL
Status: DISCONTINUED | OUTPATIENT
Start: 2023-04-19 | End: 2023-04-19 | Stop reason: HOSPADM

## 2023-04-19 RX ORDER — MAGNESIUM HYDROXIDE 1200 MG/15ML
LIQUID ORAL AS NEEDED
Status: DISCONTINUED | OUTPATIENT
Start: 2023-04-19 | End: 2023-04-19 | Stop reason: HOSPADM

## 2023-04-19 RX ORDER — ONDANSETRON 4 MG/1
4 TABLET, FILM COATED ORAL ONCE AS NEEDED
Status: DISCONTINUED | OUTPATIENT
Start: 2023-04-19 | End: 2023-04-19 | Stop reason: HOSPADM

## 2023-04-19 RX ORDER — PHENYLEPHRINE HCL IN 0.9% NACL 1 MG/10 ML
SYRINGE (ML) INTRAVENOUS AS NEEDED
Status: DISCONTINUED | OUTPATIENT
Start: 2023-04-19 | End: 2023-04-19 | Stop reason: SURG

## 2023-04-19 RX ORDER — PROMETHAZINE HYDROCHLORIDE 12.5 MG/1
25 TABLET ORAL ONCE AS NEEDED
Status: DISCONTINUED | OUTPATIENT
Start: 2023-04-19 | End: 2023-04-19 | Stop reason: HOSPADM

## 2023-04-19 RX ADMIN — ROCURONIUM BROMIDE 40 MG: 10 INJECTION, SOLUTION INTRAVENOUS at 09:25

## 2023-04-19 RX ADMIN — HYDROMORPHONE HYDROCHLORIDE 0.5 MG: 1 INJECTION, SOLUTION INTRAMUSCULAR; INTRAVENOUS; SUBCUTANEOUS at 10:49

## 2023-04-19 RX ADMIN — LIDOCAINE HYDROCHLORIDE 40 MG: 20 INJECTION, SOLUTION EPIDURAL; INFILTRATION; INTRACAUDAL; PERINEURAL at 09:25

## 2023-04-19 RX ADMIN — NEOSTIGMINE METHYLSULFATE 4 MG: 1 INJECTION INTRAVENOUS at 10:20

## 2023-04-19 RX ADMIN — GLYCOPYRROLATE 0.4 MG: 0.2 INJECTION INTRAMUSCULAR; INTRAVENOUS at 10:20

## 2023-04-19 RX ADMIN — FENTANYL CITRATE 100 MCG: 50 INJECTION, SOLUTION INTRAMUSCULAR; INTRAVENOUS at 09:25

## 2023-04-19 RX ADMIN — LABETALOL 20 MG/4 ML (5 MG/ML) INTRAVENOUS SYRINGE 10 MG: at 09:51

## 2023-04-19 RX ADMIN — GLYCOPYRROLATE 0.2 MG: 0.2 INJECTION INTRAMUSCULAR; INTRAVENOUS at 10:04

## 2023-04-19 RX ADMIN — Medication 200 MCG: at 09:30

## 2023-04-19 RX ADMIN — ACETAMINOPHEN 1000 MG: 500 TABLET ORAL at 08:17

## 2023-04-19 RX ADMIN — PROPOFOL 150 MG: 10 INJECTION, EMULSION INTRAVENOUS at 09:25

## 2023-04-19 RX ADMIN — SODIUM CHLORIDE, POTASSIUM CHLORIDE, SODIUM LACTATE AND CALCIUM CHLORIDE 9 ML/HR: 600; 310; 30; 20 INJECTION, SOLUTION INTRAVENOUS at 08:18

## 2023-04-19 RX ADMIN — KETOROLAC TROMETHAMINE 30 MG: 30 INJECTION, SOLUTION INTRAMUSCULAR; INTRAVENOUS at 10:18

## 2023-04-19 RX ADMIN — MIDAZOLAM HYDROCHLORIDE 2 MG: 1 INJECTION, SOLUTION INTRAMUSCULAR; INTRAVENOUS at 08:59

## 2023-04-19 RX ADMIN — CEFAZOLIN SODIUM 2 G: 2 INJECTION, SOLUTION INTRAVENOUS at 09:23

## 2023-04-19 RX ADMIN — DEXAMETHASONE SODIUM PHOSPHATE 8 MG: 4 INJECTION, SOLUTION INTRA-ARTICULAR; INTRALESIONAL; INTRAMUSCULAR; INTRAVENOUS; SOFT TISSUE at 09:37

## 2023-04-19 RX ADMIN — ONDANSETRON 4 MG: 2 INJECTION INTRAMUSCULAR; INTRAVENOUS at 10:04

## 2023-04-19 NOTE — DISCHARGE INSTRUCTIONS
DISCHARGE INSTRUCTIONS  SURGICAL / AMBULATORY  PROCEDURES      For your surgery you had:  General anesthesia (you may have a sore throat for the first 24 hours)  Monitored anesthesia Care  You may experience dizziness, drowsiness, or light-headedness for several hours following surgery/procedure.  Do not stay alone today or tonight.  Limit your activity for 24 hours.  Resume your diet slowly.  Follow whatever special dietary instructions you may have been given by your doctor.  You should not drive or operate machinery, drink alcohol, or sign legally binding documents for 24 hours or while you are taking pain medication.  Last dose of pain medication was given at:   .  NOTIFY YOUR DOCTOR IF YOU EXPERIENCE ANY OF THE FOLLOWING:  Temperature greater than 101 degrees Fahrenheit  Shaking Chills  Redness or excessive drainage from incision  Nausea, vomiting and/or pain that is not controlled by prescribed medications  Increase in bleeding or bleeding that is excessive  Unable to urinate in 6 hours after surgery  If unable to reach your doctor, please go to the closest Emergency Room  You may begin dressing changes:                    [x] in 48 hours FRIDAY   [] Other:    You may remove Band-Aid/dressing FRIDAY  You may shower or bathe Friday   .  Apply an ice pack 24-48 hours.  Medications per physician instructions as indicated on Discharge Medication Information Sheet.    SPECIAL INSTRUCTIONS:

## 2023-04-19 NOTE — ADDENDUM NOTE
Addendum  created 04/19/23 1157 by Sunday Fuchs MD    Order list changed, Pharmacy for encounter modified

## 2023-04-19 NOTE — ANESTHESIA POSTPROCEDURE EVALUATION
Patient: Selene Forrest    Procedure Summary     Date: 04/19/23 Room / Location: [facility]  /  [facility]    Anesthesia Start: 0917 Anesthesia Stop: 1032    Procedure: VENTRAL / INCISIONAL HERNIA REPAIR LAPAROSCOPIC WITH DAVINCI ROBOT (Abdomen) Diagnosis:       Incisional hernia, without obstruction or gangrene      (Incisional hernia, without obstruction or gangrene [K43.2])    Surgeons: Dangelo Rios MD Provider: Blake Bell MD    Anesthesia Type: general ASA Status: 3          Anesthesia Type: general    Vitals  Vitals Value Taken Time   /102 04/19/23 1131   Temp 36.6 °C (97.8 °F) 04/19/23 1035   Pulse 64 04/19/23 1135   Resp 72 04/19/23 1035   SpO2 88 % 04/19/23 1135   Vitals shown include unvalidated device data.        Post Anesthesia Care and Evaluation    Patient location during evaluation: bedside  Patient participation: complete - patient participated  Level of consciousness: awake and alert  Pain management: adequate    Airway patency: patent  Anesthetic complications: No anesthetic complications  PONV Status: none  Cardiovascular status: acceptable  Respiratory status: acceptable  Hydration status: acceptable    Comments: An Anesthesiologist personally participated in the most demanding procedures (including induction and emergence if applicable) in the anesthesia plan, monitored the course of anesthesia administration at frequent intervals and remained physically present and available for immediate diagnosis and treatment of emergencies.

## 2023-04-19 NOTE — ANESTHESIA PREPROCEDURE EVALUATION
Anesthesia Evaluation     no history of anesthetic complications:  NPO Solid Status: > 6 hours  NPO Liquid Status: > 6 hours           Airway   Mallampati: II  TM distance: >3 FB  Neck ROM: full  Dental - normal exam     Pulmonary - normal exam   (+) COPD (Former smoker, Not using inhalers or O2) mild,   Cardiovascular - normal exam  Exercise tolerance: poor (<4 METS)    (+) PENA,       Neuro/Psych  (+) psychiatric history Depression and Anxiety,    GI/Hepatic/Renal/Endo    (+) obesity,   thyroid problem hypothyroidism    Musculoskeletal (-) negative ROS    Abdominal  - normal exam   Substance History - negative use     OB/GYN          Other - negative ROS                       Anesthesia Plan    ASA 3     general     intravenous induction     Anesthetic plan, risks, benefits, and alternatives have been provided, discussed and informed consent has been obtained with: patient.    Plan discussed with CRNA and attending.        CODE STATUS:

## 2023-04-19 NOTE — NURSING NOTE
Patient having difficulty keeping Pulse Ox up to 90% without encouraging to take deep breaths with the Incentive Spirometer. Notified Dr Fuchs and was advised to give the patient a DUONEB TX and then to see how the patient is afterwards and if patient Pulse Ox is 92% or better on Room air ok to Discharge.

## 2023-04-19 NOTE — OP NOTE
VENTRAL / INCISIONAL HERNIA REPAIR LAPAROSCOPIC WITH DAVINCI ROBOT  Procedure Report    Patient Name:  Selene Forrest  YOB: 1955    Date of Surgery:  4/19/2023     Indications: The patient is a 67-year-old female that presented with a recurrent incisional hernia in the epigastrium.  The decision was made to proceed with a robotic incisional hernia repair.    Pre-op Diagnosis: Recurrent incisional hernia    Post-Op Diagnosis: Same    Procedure/CPT® Codes:    Robotic incisional hernia repair    Staff:  Surgeon(s):  Dangelo Rios MD    Assistant: Cory Rangel    Anesthesia: General    Estimated Blood Loss: minimal    Implants:    Implant Name Type Inv. Item Serial No.  Lot No. LRB No. Used Action   DEV CLS WND VLOC/180 FRANCHESKA ABS 1/2CIR SZ2/0 23CM 37MM GRN - [id number] Implant DEV CLS WND VLOC/180 FRANCHESKA ABS 1/2CIR SZ2/0 23CM 37MM GRN  COVIDIEN [id number] N/A 2 Implanted   DEV CLS WND VLOC/180 FRANCHESKA ABS 1/2CIR SZ0 23CM 37MM GRN - [id number] Implant DEV CLS WND VLOC/180 FRANCHESKA ABS 1/2CIR SZ0 23CM 37MM GRN  COVIDIEN [id number] N/A 1 Implanted   MESH VENTRALIGHT ST ECHO PS ELLIPSE 4X6IN - [id number] Implant MESH VENTRALIGHT ST ECHO PS ELLIPSE 4X6IN  DAVOL  (DIV OF CR [employer]) [id number] N/A 1 Implanted       Specimen:          None        Findings: 3 cm incisional hernia    Complications: None    Description of Procedure: The patient was taken to the operating room and placed on the table in supine position.  After induction of general anesthesia, the abdomen was prepped and draped sterilely.  The abdomen was insufflated with a Veress needle and a 12 mm left upper quadrant port was placed in the peritoneal cavity using a Visiport.  Three 8 mm da Luisa robotic trocars were then placed along the left side of the abdomen under direct vision.  The da Luisa robot was then brought to the table and the robotic arms were docked to the trocars.  There were omental adhesions up to the anterior  abdominal wall in the upper midline abdomen.  We began to take some of these adhesions down and then identified a hernia defect in the epigastrium.  There is quite a bit of contained omentum in this hernia but with some careful cautery scissor dissection we were eventually able to deliver all of the omentum out of the hernia defect.  I then took the falciform ligament down going several centimeters cephalad.  The hernia defect was about 3 x 3 cm.  The hernia was then closed primarily with a running 0 absorbable V-Loc suture.  A 10 x 15 cm piece of ventral light echo mesh was then placed into the peritoneal cavity and then pulled up to the anterior abdominal wall below her hernia defect using a suture passer.  The mesh was then sewn in place with running 2-0 absorbable V-Loc sutures.  Once her sutures were placed we appear to have good fixation of the mesh to the abdominal wall and good coverage from the margins of her hernia defect.  We had good hemostasis.  The instruments were then removed from the abdomen and the robotic arms were undocked from her trocars.  The 12 mm port site was closed with a Eran-Esther closure device and a 0 Mersilene tie.  The abdomen was desufflated and her other ports were removed.  All skin incisions were closed with 4-0 Vicryl subcuticular sutures.  Sterile dressings were applied and she was taken the postanesthesia recovery room in stable condition.    Assistant: Cory Rangel  was responsible for performing the following activities: Retraction, Placing Dressing and Held/Positioned Camera and their skilled assistance was necessary for the success of this case.    Dangelo Rios MD     Date: 4/19/2023  Time: 10:29 EDT

## 2023-05-07 LAB — QT INTERVAL: 396 MS

## 2023-05-08 RX ORDER — HYDROCODONE BITARTRATE AND ACETAMINOPHEN 5; 325 MG/1; MG/1
TABLET ORAL
COMMUNITY
Start: 2023-04-19

## 2023-05-09 ENCOUNTER — OFFICE VISIT (OUTPATIENT)
Dept: SURGERY | Facility: CLINIC | Age: 68
End: 2023-05-09
Payer: MEDICARE

## 2023-05-09 VITALS — HEIGHT: 64 IN | WEIGHT: 185 LBS | BODY MASS INDEX: 31.58 KG/M2 | RESPIRATION RATE: 16 BRPM

## 2023-05-09 DIAGNOSIS — K43.2 INCISIONAL HERNIA, WITHOUT OBSTRUCTION OR GANGRENE: Primary | ICD-10-CM

## 2023-05-09 NOTE — PROGRESS NOTES
Chief Complaint  Post-op Hernia    Subjective          Selene Forrest presents to [facility]  History of Present Illness    Selene Forrest is a 67 y.o. female  who presents today for a postoperative visit.     Patient is here for a follow-up after robotic repair of a recurrent incisional hernia in the epigastrium.  She is doing well and had no complaints today.    Past History:  Medical History: has a past medical history of Abdominal hernia, COPD (chronic obstructive pulmonary disease), Depression, Disease of thyroid gland, bone density study (08/13/2019), Ventral hernia, and Visit for screening mammogram (01/14/2019).   Surgical History: has a past surgical history that includes Breast Augmentation (Bilateral, 1992); Dilation and curettage of uterus; Hernia repair; Breast lumpectomy; Nose surgery; Tubal ligation; and Ventral hernia repair (N/A, 4/19/2023).   Family History: family history includes Breast cancer in her mother and other family members; Diabetes in an other family member; Heart disease in her brother, father, paternal grandfather, and paternal grandmother; Hypertension in her mother; Stroke in her maternal grandfather and mother.   Social History: reports that she has quit smoking. Her smoking use included cigarettes. She has a 80.00 pack-year smoking history. She has never used smokeless tobacco. She reports current alcohol use. She reports that she does not use drugs.  Allergies: Patient has no known allergies.       Current Outpatient Medications:   •  FLUoxetine (PROzac) 20 MG capsule, Take 1 capsule by mouth Daily., Disp: 90 capsule, Rfl: 1  •  HYDROcodone-acetaminophen (NORCO) 5-325 MG per tablet, Take 1-2 tablets by mouth Every 4 (Four) Hours As Needed (Pain)., Disp: 10 tablet, Rfl: 0  •  HYDROcodone-acetaminophen (NORCO) 5-325 MG per tablet, , Disp: , Rfl:   •  levothyroxine (SYNTHROID, LEVOTHROID) 25 MCG tablet, Take 1 tablet by mouth Daily., Disp:  "90 tablet, Rfl: 1       Physical Exam  Her incisions are healing up nicely and her hernia repair feels intact.  Objective     Vital Signs:   Resp 16   Ht 162.6 cm (64.02\")   Wt 83.9 kg (185 lb)   BMI 31.74 kg/m²              Assessment and Plan    Diagnoses and all orders for this visit:    1. Incisional hernia, without obstruction or gangrene (Primary)    We will see her back on an as-needed basis.  I have asked her to call me should she have any further problems.      "

## 2023-05-09 NOTE — LETTER
May 9, 2023     KIM Curiel  [address]  [address]  [place] [address]    Patient: Selene Forrest   YOB: 1955   Date of Visit: 5/9/2023       Dear KIM White:    Thank you for referring Selene Forrest to me for evaluation. Below are the relevant portions of my assessment and plan of care.    If you have questions, please do not hesitate to call me. I look forward to following Selene along with you.         Sincerely,        Dangelo Rios MD        CC: No Recipients    Dangelo Rios MD  05/09/23 1504  Signed  Chief Complaint  Post-op Hernia    Subjective           Selene Forrest presents to [facility] GENERAL SURGERY  History of Present Illness    Selene Forrest is a 67 y.o. female  who presents today for a postoperative visit.     Patient is here for a follow-up after robotic repair of a recurrent incisional hernia in the epigastrium.  She is doing well and had no complaints today.    Past History:  Medical History: has a past medical history of Abdominal hernia, COPD (chronic obstructive pulmonary disease), Depression, Disease of thyroid gland, bone density study (08/13/2019), Ventral hernia, and Visit for screening mammogram (01/14/2019).   Surgical History: has a past surgical history that includes Breast Augmentation (Bilateral, 1992); Dilation and curettage of uterus; Hernia repair; Breast lumpectomy; Nose surgery; Tubal ligation; and Ventral hernia repair (N/A, 4/19/2023).   Family History: family history includes Breast cancer in her mother and other family members; Diabetes in an other family member; Heart disease in her brother, father, paternal grandfather, and paternal grandmother; Hypertension in her mother; Stroke in her maternal grandfather and mother.   Social History: reports that she has quit smoking. Her smoking use included cigarettes. She has a 80.00 pack-year smoking history. She has never used smokeless tobacco. She reports  "current alcohol use. She reports that she does not use drugs.  Allergies: Patient has no known allergies.       Current Outpatient Medications:   •  FLUoxetine (PROzac) 20 MG capsule, Take 1 capsule by mouth Daily., Disp: 90 capsule, Rfl: 1  •  HYDROcodone-acetaminophen (NORCO) 5-325 MG per tablet, Take 1-2 tablets by mouth Every 4 (Four) Hours As Needed (Pain)., Disp: 10 tablet, Rfl: 0  •  HYDROcodone-acetaminophen (NORCO) 5-325 MG per tablet, , Disp: , Rfl:   •  levothyroxine (SYNTHROID, LEVOTHROID) 25 MCG tablet, Take 1 tablet by mouth Daily., Disp: 90 tablet, Rfl: 1       Physical Exam  Her incisions are healing up nicely and her hernia repair feels intact.  Objective      Vital Signs:   Resp 16   Ht 162.6 cm (64.02\")   Wt 83.9 kg (185 lb)   BMI 31.74 kg/m²             Assessment and Plan    Diagnoses and all orders for this visit:    1. Incisional hernia, without obstruction or gangrene (Primary)    We will see her back on an as-needed basis.  I have asked her to call me should she have any further problems.        "

## 2023-08-07 ENCOUNTER — HOSPITAL ENCOUNTER (OUTPATIENT)
Dept: MAMMOGRAPHY | Facility: HOSPITAL | Age: 68
Discharge: HOME | End: 2023-08-07
Payer: MEDICARE

## 2023-08-07 ENCOUNTER — HOSPITAL ENCOUNTER (OUTPATIENT)
Dept: BONE DENSITY | Facility: HOSPITAL | Age: 68
Discharge: HOME | End: 2023-08-07
Payer: MEDICARE

## 2023-08-07 DIAGNOSIS — Z12.31 ENCOUNTER FOR SCREENING MAMMOGRAM FOR MALIGNANT NEOPLASM OF BREAST: ICD-10-CM

## 2023-08-07 PROCEDURE — 77063 BREAST TOMOSYNTHESIS BI: CPT

## 2023-08-07 PROCEDURE — 77067 SCR MAMMO BI INCL CAD: CPT

## 2023-08-07 PROCEDURE — 77080 DXA BONE DENSITY AXIAL: CPT

## 2023-09-20 ENCOUNTER — OFFICE VISIT (OUTPATIENT)
Dept: FAMILY MEDICINE CLINIC | Facility: CLINIC | Age: 68
End: 2023-09-20
Payer: MEDICARE

## 2023-09-20 VITALS
WEIGHT: 192 LBS | BODY MASS INDEX: 32.78 KG/M2 | DIASTOLIC BLOOD PRESSURE: 63 MMHG | SYSTOLIC BLOOD PRESSURE: 144 MMHG | OXYGEN SATURATION: 95 % | HEIGHT: 64 IN | HEART RATE: 88 BPM

## 2023-09-20 DIAGNOSIS — F32.A DEPRESSION, UNSPECIFIED DEPRESSION TYPE: ICD-10-CM

## 2023-09-20 DIAGNOSIS — J44.9 CHRONIC OBSTRUCTIVE PULMONARY DISEASE, UNSPECIFIED COPD TYPE: ICD-10-CM

## 2023-09-20 DIAGNOSIS — Z13.220 SCREENING FOR CHOLESTEROL LEVEL: ICD-10-CM

## 2023-09-20 DIAGNOSIS — E03.9 HYPOTHYROIDISM, UNSPECIFIED TYPE: Primary | ICD-10-CM

## 2023-09-20 PROCEDURE — 99214 OFFICE O/P EST MOD 30 MIN: CPT | Performed by: NURSE PRACTITIONER

## 2023-09-20 RX ORDER — BUDESONIDE, GLYCOPYRROLATE, AND FORMOTEROL FUMARATE 160; 9; 4.8 UG/1; UG/1; UG/1
2 AEROSOL, METERED RESPIRATORY (INHALATION) 2 TIMES DAILY
Qty: 3 EACH | Refills: 2 | Status: SHIPPED | OUTPATIENT
Start: 2023-09-20

## 2023-09-20 RX ORDER — BUDESONIDE, GLYCOPYRROLATE, AND FORMOTEROL FUMARATE 160; 9; 4.8 UG/1; UG/1; UG/1
2 AEROSOL, METERED RESPIRATORY (INHALATION) 2 TIMES DAILY
Qty: 3 EACH | Refills: 0 | COMMUNITY
Start: 2023-09-20 | End: 2023-09-20 | Stop reason: SDUPTHER

## 2023-09-20 NOTE — PROGRESS NOTES
Chief Complaint  Depression and Hypothyroidism, COPD    Subjective            Selene Forrest presents to [facility]ICINE  History of Present Illness  Pt is a f/u for depression and hypothyroidism. PT would like to discuss getting an inhaler for her COPD. Pt has been having increased SOA due to COPD.     Pt is due labs.    Pt is due pneumonia, Tdap, shingles, and covid vaccines unavailable in office. Pt will come back to office for pneumonia when back in stock.     Pt is due CXR.      Past Medical History:   Diagnosis Date    Abdominal hernia     COPD (chronic obstructive pulmonary disease)     SOB AT TIMES, HX SMOKER PER PT- NO INHALERS- DENIED ANY SOB AT THIS TIME    Depression     Disease of thyroid gland     Hx of bone density study 08/13/2019    Ventral hernia     Visit for screening mammogram 01/14/2019       No Known Allergies     Past Surgical History:   Procedure Laterality Date    BREAST AUGMENTATION Bilateral 1992    BREAST LUMPECTOMY      DILATATION AND CURETTAGE      HERNIA REPAIR      NOSE SURGERY      TUBAL ABDOMINAL LIGATION      VENTRAL HERNIA REPAIR N/A 4/19/2023    Procedure: VENTRAL / INCISIONAL HERNIA REPAIR LAPAROSCOPIC WITH sabio labsINCI ROBOT;  Surgeon: Dangelo Rios MD;  [place] [facility] [place] [facility] [place]  Service: Robotics - DaVinci;  Laterality: N/A;        Social History     Tobacco Use    Smoking status: Former     Packs/day: 2.00     Years: 40.00     Pack years: 80.00     Types: Cigarettes    Smokeless tobacco: Never    Tobacco comments:     started at 21   Substance Use Topics    Alcohol use: Yes     Comment: OCC       Family History   Problem Relation Age of Onset    Stroke Mother     Hypertension Mother     Breast cancer Mother     Heart disease Father     Heart disease Brother     Stroke Maternal Grandfather     Heart disease Paternal Grandmother     Heart disease Paternal Grandfather     Breast cancer Other     Breast cancer Other     Diabetes Other      "    Current Outpatient Medications on File Prior to Visit   Medication Sig    FLUoxetine (PROzac) 20 MG capsule TAKE 1 CAPSULE EVERY DAY    HYDROcodone-acetaminophen (NORCO) 5-325 MG per tablet Take 1-2 tablets by mouth Every 4 (Four) Hours As Needed (Pain).    levothyroxine (SYNTHROID, LEVOTHROID) 25 MCG tablet TAKE 1 TABLET EVERY DAY    HYDROcodone-acetaminophen (NORCO) 5-325 MG per tablet  (Patient not taking: Reported on 9/20/2023)     No current facility-administered medications on file prior to visit.       Health Maintenance Due   Topic Date Due    COVID-19 Vaccine (1) Never done    ZOSTER VACCINE (1 of 2) Never done       Objective     /63   Pulse 88   Ht 162.6 cm (64\")   Wt 87.1 kg (192 lb)   SpO2 95%   BMI 32.96 kg/m²       Physical Exam  Constitutional:       General: She is not in acute distress.     Appearance: Normal appearance. She is not ill-appearing.   HENT:      Head: Normocephalic and atraumatic.      Right Ear: Tympanic membrane, ear canal and external ear normal.      Left Ear: Tympanic membrane, ear canal and external ear normal.      Nose: Nose normal.   Cardiovascular:      Rate and Rhythm: Normal rate and regular rhythm.      Heart sounds: Normal heart sounds. No murmur heard.  Pulmonary:      Effort: Pulmonary effort is normal. No respiratory distress.      Breath sounds: Normal breath sounds.   Chest:      Chest wall: No tenderness.   Abdominal:      General: There is no distension.      Palpations: There is no mass.      Tenderness: There is no abdominal tenderness. There is no guarding.   Musculoskeletal:         General: No swelling or tenderness. Normal range of motion.      Cervical back: Normal range of motion and neck supple.   Skin:     General: Skin is warm and dry.      Findings: No rash.   Neurological:      General: No focal deficit present.      Mental Status: She is alert and oriented to person, place, and time. Mental status is at baseline.      Gait: Gait " normal.   Psychiatric:         Mood and Affect: Mood normal.         Behavior: Behavior normal.         Thought Content: Thought content normal.         Judgment: Judgment normal.         Result Review :                           Assessment and Plan        Diagnoses and all orders for this visit:    1. Hypothyroidism, unspecified type (Primary)  Comments:  stable on synthroid 25mcg, continue  Orders:  -     TSH; Future    2. Depression, unspecified depression type  Comments:  stable on prozac 20mg, continue    3. Screening for cholesterol level  -     Lipid panel; Future  -     Comprehensive metabolic panel; Future  -     CBC w AUTO Differential; Future    4. Chronic obstructive pulmonary disease, unspecified COPD type  -     XR Chest PA & Lateral; Future  -     Budeson-Glycopyrrol-Formoterol (Breztri Aerosphere) 160-9-4.8 MCG/ACT aerosol inhaler; Inhale 2 puffs 2 (Two) Times a Day.  Dispense: 3 each; Refill: 2    Other orders  -     Discontinue: Budeson-Glycopyrrol-Formoterol (Breztri Aerosphere) 160-9-4.8 MCG/ACT aerosol inhaler; Inhale 2 puffs 2 (Two) Times a Day.  Dispense: 3 each; Refill: 0              Follow Up     Return in about 6 months (around 3/20/2024).    Patient was given instructions and counseling regarding her condition or for health maintenance advice. Please see specific information pulled into the AVS if appropriate.     Selene Forrest  reports that she has quit smoking. Her smoking use included cigarettes. She has a 80.00 pack-year smoking history. She has never used smokeless tobacco..  KIM Curiel

## 2023-09-20 NOTE — LETTER
[facility]  Vaccine Consent Form    Patient Name:  Selene Forrest  Patient :  1955   PCV20     Screening Checklist  The following questions should be completed prior to vaccination. If you answer “yes” to any question, it does not necessarily mean you should not be vaccinated. It just means we may need to clarify or ask more questions. If a question is unclear, please ask your healthcare provider to explain it.    Yes No   Any fever or moderate to severe illness today (mild illness and/or antibiotic treatment are not contraindications)?     Do you have a history of a serious reaction to any previous vaccinations, such as anaphylaxis, encephalopathy within 7 days, Guillain-[place] syndrome within 6 weeks, seizure?     Have you received any live vaccine(s) in the past month (MMR, SALMA)?     Do you have an anaphylactic allergy to latex (DTaP, DTaP-IPV, Hep A, Hep B, MenB, RV, Td, Tdap), baker’s yeast (Hep B, HPV), or gelatin (SALMA, MMR)?     Do you have an anaphylactic allergy to neomycin (Rabies, SALMA, MMR, IPV, Hep A), polymyxin B (IPV), or streptomycin (IPV)?      Any cancer, leukemia, AIDS, or other immune system disorder? (SALMA, MMR, RV)     Do you have a parent, brother, or sister with an immune system problem (if immune competence of vaccine recipient clinically verified, can proceed)? (MMR, SALMA)     Any recent steroid treatments for >2 weeks, chemotherapy, or radiation treatment? (SALMA, MMR)     Have you received antibody-containing blood transfusions or IVIG in the past 11 months (recommended interval is dependent on product)? (MMR, SALMA)     Have you taken antiviral drugs (acyclovir, famciclovir, valacyclovir) in the last 24 or 48 hours, respectively (SALMA)?      Are you pregnant or planning to become pregnant within 1 month? (SALMA, MMR, HPV, IPV, MenB; For hep B- refer to Engerix-B)     For infants, have you ever been told your child has had intussusception or a medical emergency involving  obstruction of the intestine (RV)? If not for an infant, can skip this question.         *Ordering Physician/APC should be consulted if “yes” is checked by the patient or guardian above.      I have received, read, and understand the Vaccine Information Statement (VIS) for each vaccine ordered above.  I have considered my health status as well as the health status of my close contacts.  I have taken the opportunity to discuss my vaccine questions with my health care provider.   I have requested that the ordered vaccine(s) be given to me.  I understand the benefits and risks of the vaccines.  I understand that I should remain in the clinic for 15 minutes after receiving the vaccine(s).  _________________________________________________________  Signature of Patient or Parent/Legal Guardian ____________________  Date

## 2023-11-22 ENCOUNTER — TELEPHONE (OUTPATIENT)
Dept: FAMILY MEDICINE CLINIC | Facility: CLINIC | Age: 68
End: 2023-11-22
Payer: MEDICARE

## 2023-11-22 NOTE — TELEPHONE ENCOUNTER
HUB TO RELAY:    Left message that an xray of the chest was previously ordered has not yet been completed.  Asked the patient to call back if the office could help get the test completed, or to contact us if, for any reason, unable to complete your lab tests within the next two weeks. We would like to discuss alternative medications or lab schedules if possible.    You may get this done at the [facility] or at [facility] ([facility] in [facility].    Please call [phone], Opt. [phone] to get this scheduled if you haven't already.    Thank you!

## 2023-12-27 ENCOUNTER — APPOINTMENT (OUTPATIENT)
Dept: GENERAL RADIOLOGY | Facility: HOSPITAL | Age: 68
End: 2023-12-27
Payer: MEDICARE

## 2023-12-27 ENCOUNTER — HOSPITAL ENCOUNTER (INPATIENT)
Facility: HOSPITAL | Age: 68
LOS: 4 days | Discharge: HOME | End: 2024-01-01
Attending: EMERGENCY MEDICINE | Admitting: INTERNAL MEDICINE
Payer: MEDICARE

## 2023-12-27 DIAGNOSIS — J44.1 COPD EXACERBATION: ICD-10-CM

## 2023-12-27 DIAGNOSIS — J96.01 ACUTE RESPIRATORY FAILURE WITH HYPOXIA: Primary | ICD-10-CM

## 2023-12-27 DIAGNOSIS — Z78.9 DECREASED ACTIVITIES OF DAILY LIVING (ADL): ICD-10-CM

## 2023-12-27 DIAGNOSIS — R26.2 DIFFICULTY WALKING: ICD-10-CM

## 2023-12-27 LAB
ALBUMIN SERPL-MCNC: 3.4 G/DL (ref 3.5–5.2)
ALBUMIN/GLOB SERPL: 0.9 G/DL
ALP SERPL-CCNC: 82 U/L (ref 39–117)
ALT SERPL W P-5'-P-CCNC: 94 U/L (ref 1–33)
ANION GAP SERPL CALCULATED.3IONS-SCNC: 16.9 MMOL/L (ref 5–15)
ARTERIAL PATENCY WRIST A: ABNORMAL
AST SERPL-CCNC: 115 U/L (ref 1–32)
BASE EXCESS BLDA CALC-SCNC: -0.1 MMOL/L (ref -2–2)
BASOPHILS # BLD AUTO: 0.03 10*3/MM3 (ref 0–0.2)
BASOPHILS NFR BLD AUTO: 0.2 % (ref 0–1.5)
BDY SITE: ABNORMAL
BILIRUB SERPL-MCNC: 0.4 MG/DL (ref 0–1.2)
BUN SERPL-MCNC: 28 MG/DL (ref 8–23)
BUN/CREAT SERPL: 29.5 (ref 7–25)
CA-I BLDA-SCNC: 1.18 MMOL/L (ref 1.13–1.32)
CALCIUM SPEC-SCNC: 9.5 MG/DL (ref 8.6–10.5)
CHLORIDE BLDA-SCNC: 96 MMOL/L (ref 98–106)
CHLORIDE SERPL-SCNC: 94 MMOL/L (ref 98–107)
CO2 SERPL-SCNC: 24.1 MMOL/L (ref 22–29)
COHGB MFR BLD: 1 % (ref 0–1.5)
CREAT SERPL-MCNC: 0.95 MG/DL (ref 0.57–1)
DEPRECATED RDW RBC AUTO: 47.5 FL (ref 37–54)
EGFRCR SERPLBLD CKD-EPI 2021: 65.8 ML/MIN/1.73
EOSINOPHIL # BLD AUTO: 0.01 10*3/MM3 (ref 0–0.4)
EOSINOPHIL NFR BLD AUTO: 0.1 % (ref 0.3–6.2)
ERYTHROCYTE [DISTWIDTH] IN BLOOD BY AUTOMATED COUNT: 14.2 % (ref 12.3–15.4)
FHHB: 5.5 % (ref 0–5)
FLUAV SUBTYP SPEC NAA+PROBE: NOT DETECTED
FLUBV RNA ISLT QL NAA+PROBE: NOT DETECTED
GAS FLOW AIRWAY: 2 LPM
GLOBULIN UR ELPH-MCNC: 4 GM/DL
GLUCOSE BLDA-MCNC: 132 MG/DL (ref 65–99)
GLUCOSE SERPL-MCNC: 159 MG/DL (ref 65–99)
HCO3 BLDA-SCNC: 23.4 MMOL/L (ref 22–26)
HCT VFR BLD AUTO: 44.8 % (ref 34–46.6)
HGB BLD-MCNC: 15.4 G/DL (ref 12–15.9)
HGB BLDA-MCNC: 16.6 G/DL (ref 11.7–14.6)
HOLD SPECIMEN: NORMAL
HOLD SPECIMEN: NORMAL
IMM GRANULOCYTES # BLD AUTO: 0.05 10*3/MM3 (ref 0–0.05)
IMM GRANULOCYTES NFR BLD AUTO: 0.4 % (ref 0–0.5)
INHALED O2 CONCENTRATION: 28 %
LACTATE BLDA-SCNC: 1.16 MMOL/L (ref 0.5–2)
LYMPHOCYTES # BLD AUTO: 0.9 10*3/MM3 (ref 0.7–3.1)
LYMPHOCYTES NFR BLD AUTO: 7.4 % (ref 19.6–45.3)
MCH RBC QN AUTO: 31.2 PG (ref 26.6–33)
MCHC RBC AUTO-ENTMCNC: 34.4 G/DL (ref 31.5–35.7)
MCV RBC AUTO: 90.9 FL (ref 79–97)
METHGB BLD QL: 0.2 % (ref 0–1.5)
MODALITY: ABNORMAL
MONOCYTES # BLD AUTO: 1.44 10*3/MM3 (ref 0.1–0.9)
MONOCYTES NFR BLD AUTO: 11.8 % (ref 5–12)
NEUTROPHILS NFR BLD AUTO: 80.1 % (ref 42.7–76)
NEUTROPHILS NFR BLD AUTO: 9.8 10*3/MM3 (ref 1.7–7)
NOTE: ABNORMAL
NRBC BLD AUTO-RTO: 0 /100 WBC (ref 0–0.2)
NT-PROBNP SERPL-MCNC: 593.3 PG/ML (ref 0–900)
OXYHGB MFR BLDV: 93.3 % (ref 94–99)
PCO2 BLDA: 35.2 MM HG (ref 35–45)
PH BLDA: 7.44 PH UNITS (ref 7.35–7.45)
PLATELET # BLD AUTO: 272 10*3/MM3 (ref 140–450)
PMV BLD AUTO: 9.7 FL (ref 6–12)
PO2 BLD: 270 MM[HG] (ref 0–500)
PO2 BLDA: 75.6 MM HG (ref 80–100)
POTASSIUM BLDA-SCNC: 3.59 MMOL/L (ref 3.5–5)
POTASSIUM SERPL-SCNC: 3.6 MMOL/L (ref 3.5–5.2)
PROT SERPL-MCNC: 7.4 G/DL (ref 6–8.5)
RBC # BLD AUTO: 4.93 10*6/MM3 (ref 3.77–5.28)
RSV RNA NPH QL NAA+NON-PROBE: NOT DETECTED
SAO2 % BLDCOA: 94.4 % (ref 95–99)
SARS-COV-2 RNA RESP QL NAA+PROBE: NOT DETECTED
SARS-COV-2 RNA RESP QL NAA+PROBE: NOT DETECTED
SODIUM BLDA-SCNC: 133.8 MMOL/L (ref 136–146)
SODIUM SERPL-SCNC: 135 MMOL/L (ref 136–145)
TROPONIN T SERPL HS-MCNC: 23 NG/L
WBC NRBC COR # BLD AUTO: 12.23 10*3/MM3 (ref 3.4–10.8)
WHOLE BLOOD HOLD COAG: NORMAL
WHOLE BLOOD HOLD SPECIMEN: NORMAL

## 2023-12-27 PROCEDURE — 99285 EMERGENCY DEPT VISIT HI MDM: CPT

## 2023-12-27 PROCEDURE — 71045 X-RAY EXAM CHEST 1 VIEW: CPT

## 2023-12-27 PROCEDURE — 85025 COMPLETE CBC W/AUTO DIFF WBC: CPT

## 2023-12-27 PROCEDURE — 84443 ASSAY THYROID STIM HORMONE: CPT | Performed by: STUDENT IN AN ORGANIZED HEALTH CARE EDUCATION/TRAINING PROGRAM

## 2023-12-27 PROCEDURE — 94640 AIRWAY INHALATION TREATMENT: CPT

## 2023-12-27 PROCEDURE — 82746 ASSAY OF FOLIC ACID SERUM: CPT | Performed by: STUDENT IN AN ORGANIZED HEALTH CARE EDUCATION/TRAINING PROGRAM

## 2023-12-27 PROCEDURE — 36415 COLL VENOUS BLD VENIPUNCTURE: CPT

## 2023-12-27 PROCEDURE — 83050 HGB METHEMOGLOBIN QUAN: CPT

## 2023-12-27 PROCEDURE — 82805 BLOOD GASES W/O2 SATURATION: CPT

## 2023-12-27 PROCEDURE — 87635 SARS-COV-2 COVID-19 AMP PRB: CPT | Performed by: EMERGENCY MEDICINE

## 2023-12-27 PROCEDURE — 82375 ASSAY CARBOXYHB QUANT: CPT

## 2023-12-27 PROCEDURE — 36600 WITHDRAWAL OF ARTERIAL BLOOD: CPT

## 2023-12-27 PROCEDURE — 93010 ELECTROCARDIOGRAM REPORT: CPT | Performed by: INTERNAL MEDICINE

## 2023-12-27 PROCEDURE — 80053 COMPREHEN METABOLIC PANEL: CPT

## 2023-12-27 PROCEDURE — 93005 ELECTROCARDIOGRAM TRACING: CPT

## 2023-12-27 PROCEDURE — 82607 VITAMIN B-12: CPT | Performed by: STUDENT IN AN ORGANIZED HEALTH CARE EDUCATION/TRAINING PROGRAM

## 2023-12-27 PROCEDURE — 83880 ASSAY OF NATRIURETIC PEPTIDE: CPT

## 2023-12-27 PROCEDURE — 94799 UNLISTED PULMONARY SVC/PX: CPT

## 2023-12-27 PROCEDURE — 84484 ASSAY OF TROPONIN QUANT: CPT

## 2023-12-27 PROCEDURE — 87637 SARSCOV2&INF A&B&RSV AMP PRB: CPT | Performed by: EMERGENCY MEDICINE

## 2023-12-27 PROCEDURE — 84484 ASSAY OF TROPONIN QUANT: CPT | Performed by: EMERGENCY MEDICINE

## 2023-12-27 RX ORDER — SODIUM CHLORIDE 0.9 % (FLUSH) 0.9 %
10 SYRINGE (ML) INJECTION AS NEEDED
Status: DISCONTINUED | OUTPATIENT
Start: 2023-12-27 | End: 2024-01-01 | Stop reason: HOSPADM

## 2023-12-27 RX ORDER — METHYLPREDNISOLONE SODIUM SUCCINATE 125 MG/2ML
125 INJECTION, POWDER, LYOPHILIZED, FOR SOLUTION INTRAMUSCULAR; INTRAVENOUS ONCE
Status: COMPLETED | OUTPATIENT
Start: 2023-12-28 | End: 2023-12-28

## 2023-12-27 RX ORDER — IPRATROPIUM BROMIDE AND ALBUTEROL SULFATE 2.5; .5 MG/3ML; MG/3ML
3 SOLUTION RESPIRATORY (INHALATION) ONCE
Status: COMPLETED | OUTPATIENT
Start: 2023-12-28 | End: 2023-12-27

## 2023-12-27 RX ADMIN — IPRATROPIUM BROMIDE AND ALBUTEROL SULFATE 3 ML: .5; 3 SOLUTION RESPIRATORY (INHALATION) at 23:59

## 2023-12-27 NOTE — ED PROVIDER NOTES
Time: 6:47 PM EST  Date of encounter:  12/27/2023  Independent Historian/Clinical History and Information was obtained by:   Patient and Family    History is limited by: N/A    Chief Complaint   Patient presents with    Shortness of Breath    Altered Mental Status         History of Present Illness:  Patient is a 68 y.o. year old female who presents to the emergency department for evaluation of altered mental status, shortness of breath, cough, congestion.  Patient was diagnosed with flu a week ago and has had increased confusion over the past month.  Patient is brought to the ED by her nephew who does not see her very often but was alerted by one of her neighbors that he needed to come check on her because she was not acting herself. (KIM Robison, provider in triage)     Patient is 68-year-old female with a history of lung disease who presents with complaints of shortness of breath, cough, congestion.  Reports that she has been around her neighbors who has had flulike symptoms.  States that she just has not felt very well and has been more short of breath.  Brought in by the nephew because the neighbors stated that she has been not acting herself and has been more confused.  She is not on oxygen at home.  Has no other complaints this time.    Patient Care Team  Primary Care Provider: Gwen Chavez APRN    Past Medical History:     No Known Allergies  Past Medical History:   Diagnosis Date    Abdominal hernia     COPD (chronic obstructive pulmonary disease)     SOB AT TIMES, HX SMOKER PER PT- NO INHALERS- DENIED ANY SOB AT THIS TIME    Depression     Disease of thyroid gland     Hx of bone density study 08/13/2019    Ventral hernia     Visit for screening mammogram 01/14/2019     Past Surgical History:   Procedure Laterality Date    BREAST AUGMENTATION Bilateral 1992    BREAST LUMPECTOMY      DILATATION AND CURETTAGE      HERNIA REPAIR      NOSE SURGERY      TUBAL ABDOMINAL LIGATION      VENTRAL HERNIA  "REPAIR N/A 4/19/2023    Procedure: VENTRAL / INCISIONAL HERNIA REPAIR LAPAROSCOPIC WITH DAVINCI ROBOT;  Surgeon: Dangelo Rios MD;  Location[place] [facility] [place]  Service: Robotics - DaVinci;  Laterality: N/A;     Family History   Problem Relation Age of Onset    Stroke Mother     Hypertension Mother     Breast cancer Mother     Heart disease Father     Heart disease Brother     Stroke Maternal Grandfather     Heart disease Paternal Grandmother     Heart disease Paternal Grandfather     Breast cancer Other     Breast cancer Other     Diabetes Other        Home Medications:  Prior to Admission medications    Medication Sig Start Date End Date Taking? Authorizing Provider   Budeson-Glycopyrrol-Formoterol (Breztri Aerosphere) 160-9-4.8 MCG/ACT aerosol inhaler Inhale 2 puffs 2 (Two) Times a Day. 9/20/23   Gwen Chavez APRN   FLUoxetine (PROzac) 20 MG capsule TAKE 1 CAPSULE EVERY DAY 6/22/23   Gwen Chavez APRN   HYDROcodone-acetaminophen (NORCO) 5-325 MG per tablet Take 1-2 tablets by mouth Every 4 (Four) Hours As Needed (Pain). 4/19/23   Dangelo Rios MD   HYDROcodone-acetaminophen (NORCO) 5-325 MG per tablet  4/19/23   Provider, MD George   levothyroxine (SYNTHROID, LEVOTHROID) 25 MCG tablet TAKE 1 TABLET EVERY DAY 6/22/23   Gwen Chavez APRN        Social History:   Social History     Tobacco Use    Smoking status: Former     Packs/day: 2.00     Years: 40.00     Additional pack years: 0.00     Total pack years: 80.00     Types: Cigarettes    Smokeless tobacco: Never    Tobacco comments:     started at 21   Vaping Use    Vaping Use: Never used   Substance Use Topics    Alcohol use: Yes     Comment: OCC    Drug use: Never         Review of Systems:  Review of Systems   Respiratory:  Positive for cough and shortness of breath.    Psychiatric/Behavioral:  Positive for confusion.         Physical Exam:  /84   Pulse 93   Temp 97.7 °F (36.5 °C) (Oral)   Resp 22   Ht 167.6 cm (66\")   Wt 81.6 kg " (180 lb)   SpO2 (!) 87%   BMI 29.05 kg/m²         Physical Exam  Vitals and nursing note reviewed.   Constitutional:       Appearance: Normal appearance.   HENT:      Head: Normocephalic and atraumatic.   Eyes:      General: No scleral icterus.  Cardiovascular:      Rate and Rhythm: Normal rate and regular rhythm.      Heart sounds: Normal heart sounds.   Pulmonary:      Effort: Respiratory distress present.      Breath sounds: Wheezing present.      Comments: Decreased breath sounds bilaterally with wheezes and accessory muscle use.  Abdominal:      Palpations: Abdomen is soft.      Tenderness: There is no abdominal tenderness.   Musculoskeletal:         General: Normal range of motion.      Cervical back: Normal range of motion.   Skin:     Findings: No rash.   Neurological:      General: No focal deficit present.      Mental Status: She is alert and oriented to person, place, and time.                      Procedures:  Procedures      Medical Decision Making:      Comorbidities that affect care:    COPD    External Notes reviewed:    Reviewed PCP note from 9/20/2023      The following orders were placed and all results were independently analyzed by me:  Orders Placed This Encounter   Procedures    COVID PRE-OP / PRE-PROCEDURE SCREENING ORDER (NO ISOLATION) - Swab, Nasopharynx    COVID-19,CEPHEID/DAVIAN,COR/YOLANDA/PAD/ROMAN/LAG IN-HOUSE,NP SWAB IN TRANSPORT MEDIA 1 HR TAT, RT-PCR - Swab, Nasopharynx    COVID-19, FLU A/B, RSV PCR 1 HR TAT - Swab, Nasopharynx    XR Chest 1 View    [place] Draw    Comprehensive Metabolic Panel    BNP    Single High Sensitivity Troponin T    CBC Auto Differential    ABG with Co-Ox and Electrolytes    Urinalysis With Culture If Indicated - Urine, Clean Catch    High Sensitivity Troponin T    High Sensitivity Troponin T 2Hr    NPO Diet NPO Type: Strict NPO    Undress & Gown    Continuous Pulse Oximetry    Vital Signs    Inpatient Hospitalist Consult    Oxygen Therapy- Nasal Cannula; Titrate  1-6 LPM Per SpO2; 90 - 95%    ECG 12 Lead ED Triage Standing Order; SOA    Insert Peripheral IV    CBC & Differential    Green Top (Gel)    Lavender Top    Gold Top - SST    Light Blue Top       Medications Given in the Emergency Department:  Medications   sodium chloride 0.9 % flush 10 mL (has no administration in time range)   methylPREDNISolone sodium succinate (SOLU-Medrol) injection 125 mg (has no administration in time range)   ipratropium-albuterol (DUO-NEB) nebulizer solution 3 mL (3 mL Nebulization Given 12/27/23 3038)        ED Course:    The patient was initially evaluated in the triage area where orders were placed. The patient was later dispositioned by Christian Tilley MD.      The patient was advised to stay for completion of workup which includes but is not limited to communication of labs and radiological results, reassessment and plan. The patient was advised that leaving prior to disposition by a provider could result in critical findings that are not communicated to the patient.     ED Course as of 12/28/23 0009   Th Dec 28, 2023   0008 Spoke with Dr. Fulton who agrees to admit [MA]      ED Course User Index  [MA] Christian Tilley MD       Labs:    Lab Results (last 24 hours)       Procedure Component Value Units Date/Time    ABG with Co-Ox and Electrolytes [[id number]]  (Abnormal) Collected: 12/27/23 1857    Specimen: Arterial Blood from Arm, Right Updated: 12/27/23 1900     pH, Arterial 7.440 pH units      pCO2, Arterial 35.2 mm Hg      pO2, Arterial 75.6 mm Hg      HCO3, Arterial 23.4 mmol/L      Base Excess, Arterial -0.1 mmol/L      O2 Saturation, Arterial 94.4 %      Hemoglobin, Blood Gas 16.6 g/dL      Carboxyhemoglobin 1.0 %      Methemoglobin 0.20 %      Oxyhemoglobin 93.3 %      FHHB 5.5 %      Landon's Test --     Note --     Site Arterial: right brachial     Modality Cannula - Nasal     FIO2 28 %      Flow Rate 2 lpm      Sodium, Arterial 133.8 mmol/L      Potassium, Arterial  3.59 mmol/L      Ionized Calcium, Arterial 1.18 mmol/L      Chloride, Arterial 96 mmol/L      Glucose, Arterial 132 mg/dL      Lactate, Arterial 1.16 mmol/L      PO2/FIO2 270    CBC & Differential [[id number]]  (Abnormal) Collected: 12/27/23 2111    Specimen: Blood Updated: 12/27/23 2120    Narrative:      The following orders were created for panel order CBC & Differential.  Procedure                               Abnormality         Status                     ---------                               -----------         ------                     CBC Auto Differential[[id number]]        Abnormal            Final result                 Please view results for these tests on the individual orders.    Comprehensive Metabolic Panel [[id number]]  (Abnormal) Collected: 12/27/23 2111    Specimen: Blood Updated: 12/27/23 2141     Glucose 159 mg/dL      BUN 28 mg/dL      Creatinine 0.95 mg/dL      Sodium 135 mmol/L      Potassium 3.6 mmol/L      Chloride 94 mmol/L      CO2 24.1 mmol/L      Calcium 9.5 mg/dL      Total Protein 7.4 g/dL      Albumin 3.4 g/dL      ALT (SGPT) 94 U/L      AST (SGOT) 115 U/L      Alkaline Phosphatase 82 U/L      Total Bilirubin 0.4 mg/dL      Globulin 4.0 gm/dL      A/G Ratio 0.9 g/dL      BUN/Creatinine Ratio 29.5     Anion Gap 16.9 mmol/L      eGFR 65.8 mL/min/1.73     Narrative:      GFR Normal >60  Chronic Kidney Disease <60  Kidney Failure <15      BNP [[id number]]  (Normal) Collected: 12/27/23 2111    Specimen: Blood Updated: 12/27/23 2137     proBNP 593.3 pg/mL     Narrative:      This assay is used as an aid in the diagnosis of individuals suspected of having heart failure. It can be used as an aid in the diagnosis of acute decompensated heart failure (ADHF) in patients presenting with signs and symptoms of ADHF to the emergency department (ED). In addition, NT-proBNP of <300 pg/mL indicates ADHF is not likely.    Age Range Result Interpretation  NT-proBNP Concentration (pg/mL:      <50              Positive            >450                   Gray                 300-450                    Negative             <300    50-75           Positive            >900                  Gray                300-900                  Negative            <300      >75             Positive            >1800                  Gray                300-1800                  Negative            <300    Single High Sensitivity Troponin T [[id number]]  (Abnormal) Collected: 12/27/23 2111    Specimen: Blood Updated: 12/27/23 2141     HS Troponin T 23 ng/L     Narrative:      High Sensitive Troponin T Reference Range:  <14.0 ng/L- Negative Female for AMI  <22.0 ng/L- Negative Male for AMI  >=14 - Abnormal Female indicating possible myocardial injury.  >=22 - Abnormal Male indicating possible myocardial injury.   Clinicians would have to utilize clinical acumen, EKG, Troponin, and serial changes to determine if it is an Acute Myocardial Infarction or myocardial injury due to an underlying chronic condition.         CBC Auto Differential [[id number]]  (Abnormal) Collected: 12/27/23 2111    Specimen: Blood Updated: 12/27/23 2120     WBC 12.23 10*3/mm3      RBC 4.93 10*6/mm3      Hemoglobin 15.4 g/dL      Hematocrit 44.8 %      MCV 90.9 fL      MCH 31.2 pg      MCHC 34.4 g/dL      RDW 14.2 %      RDW-SD 47.5 fl      MPV 9.7 fL      Platelets 272 10*3/mm3      Neutrophil % 80.1 %      Lymphocyte % 7.4 %      Monocyte % 11.8 %      Eosinophil % 0.1 %      Basophil % 0.2 %      Immature Grans % 0.4 %      Neutrophils, Absolute 9.80 10*3/mm3      Lymphocytes, Absolute 0.90 10*3/mm3      Monocytes, Absolute 1.44 10*3/mm3      Eosinophils, Absolute 0.01 10*3/mm3      Basophils, Absolute 0.03 10*3/mm3      Immature Grans, Absolute 0.05 10*3/mm3      nRBC 0.0 /100 WBC     COVID PRE-OP / PRE-PROCEDURE SCREENING ORDER (NO ISOLATION) - Swab, Nasopharynx [[id number]]  (Normal) Collected: 12/27/23 2116    Specimen: Swab from Nasopharynx Updated: 12/27/23  2205    Narrative:      The following orders were created for panel order COVID PRE-OP / PRE-PROCEDURE SCREENING ORDER (NO ISOLATION) - Swab, Nasopharynx.  Procedure                               Abnormality         Status                     ---------                               -----------         ------                     COVID-19,CEPHEID/DAVIAN,CO...[[id number]]  Normal              Final result                 Please view results for these tests on the individual orders.    COVID-19,CEPHEID/DAVIAN,COR/YOLANDA/PAD/ROMAN/LAG IN-HOUSE,NP SWAB IN TRANSPORT MEDIA 1 HR TAT, RT-PCR - Swab, Nasopharynx [[id number]]  (Normal) Collected: 12/27/23 2116    Specimen: Swab from Nasopharynx Updated: 12/27/23 2205     COVID19 Not Detected    Narrative:      Fact sheet for providers: https://www.fda.gov/media/700320/download     Fact sheet for patients: https://www.fda.gov/media/569531/download  Fact sheet for providers: https://www.fda.gov/media/833986/download     Fact sheet for patients: https://www.fda.gov/media/177496/download    COVID-19, FLU A/B, RSV PCR 1 HR TAT - Swab, Nasopharynx [[id number]]  (Normal) Collected: 12/27/23 2240    Specimen: Swab from Nasopharynx Updated: 12/27/23 2326     COVID19 Not Detected     Influenza A PCR Not Detected     Influenza B PCR Not Detected     RSV, PCR Not Detected    Narrative:      Fact sheet for providers: https://www.fda.gov/media/511770/download    Fact sheet for patients: https://www.fda.gov/media/326883/download    Test performed by PCR.    High Sensitivity Troponin T [[id number]]  (Abnormal) Collected: 12/27/23 2332    Specimen: Blood Updated: 12/28/23 0003     HS Troponin T 23 ng/L     Narrative:      High Sensitive Troponin T Reference Range:  <14.0 ng/L- Negative Female for AMI  <22.0 ng/L- Negative Male for AMI  >=14 - Abnormal Female indicating possible myocardial injury.  >=22 - Abnormal Male indicating possible myocardial injury.   Clinicians would have to utilize clinical acumen,  EKG, Troponin, and serial changes to determine if it is an Acute Myocardial Infarction or myocardial injury due to an underlying chronic condition.                  Imaging:    XR Chest 1 View    Result Date: 12/27/2023  PROCEDURE: XR CHEST 1 VW  COMPARISON: [facility], CR, XR CHEST PA AND LATERAL, 9/13/2021, 16:03.  INDICATIONS: LEFT SIDED CHEST PAIN  FINDINGS:  The heart is normal in size.  The lungs have a hyperexpanded appearance consistent with emphysema.  Chronic changes at the lung bases appear stable.  Bony structures appear intact.        Emphysema.  No active disease is seen.       MILY KUMARI MD       Electronically Signed and Approved By: MILY KUMARI MD on 12/27/2023 at 20:03                Differential Diagnosis and Discussion:      Dyspnea: Differential diagnosis includes but is not limited to metabolic acidosis, neurological disorders, psychogenic, asthma, pneumothorax, upper airway obstruction, COPD, pneumonia, noncardiogenic pulmonary edema, interstitial lung disease, anemia, congestive heart failure, and pulmonary embolism    All labs were reviewed and interpreted by me.  All X-rays impressions were independently interpreted by me.  EKG was interpreted by me.    MDM     Patient is a 68-year-old female with a history of COPD who presents with complaints of shortness of breath and flulike symptoms as well as some mental status change.  Workup here shows some respiratory distress as well as hypoxia.  Requiring oxygen which is new for her.  The change in mental status likely due to her hypoxia and COPD.  She does need admission at this time due to her new requirement of oxygen.  Will admit for further workup management.          Patient Care Considerations:          Consultants/Shared Management Plan:    Hospitalist: I have discussed the case with Dr. Fulton who agrees to accept the patient for admission.    Social Determinants of Health:          Disposition and Care  Coordination:    Admit:   Through independent evaluation of the patient's history, physical, and imperical data, the patient meets criteria for observation/admission to the hospital.        Final diagnoses:   Acute respiratory failure with hypoxia   COPD exacerbation        ED Disposition       ED Disposition   Decision to Admit    Condition   --    Comment   --               This medical record created using voice recognition software.             Christian Tilley MD  12/28/23 0009

## 2023-12-28 PROBLEM — J44.1 COPD EXACERBATION: Status: ACTIVE | Noted: 2023-12-28

## 2023-12-28 LAB
AMMONIA BLD-SCNC: 30 UMOL/L (ref 11–51)
ANION GAP SERPL CALCULATED.3IONS-SCNC: 12.9 MMOL/L (ref 5–15)
BACTERIA UR QL AUTO: ABNORMAL /HPF
BASOPHILS # BLD AUTO: 0.03 10*3/MM3 (ref 0–0.2)
BASOPHILS NFR BLD AUTO: 0.3 % (ref 0–1.5)
BILIRUB UR QL STRIP: NEGATIVE
BUN SERPL-MCNC: 26 MG/DL (ref 8–23)
BUN/CREAT SERPL: 30.2 (ref 7–25)
CALCIUM SPEC-SCNC: 9.4 MG/DL (ref 8.6–10.5)
CHLORIDE SERPL-SCNC: 98 MMOL/L (ref 98–107)
CLARITY UR: ABNORMAL
CO2 SERPL-SCNC: 23.1 MMOL/L (ref 22–29)
COLOR UR: YELLOW
CREAT SERPL-MCNC: 0.86 MG/DL (ref 0.57–1)
DEPRECATED RDW RBC AUTO: 52.1 FL (ref 37–54)
EGFRCR SERPLBLD CKD-EPI 2021: 74.2 ML/MIN/1.73
EOSINOPHIL # BLD AUTO: 0 10*3/MM3 (ref 0–0.4)
EOSINOPHIL NFR BLD AUTO: 0 % (ref 0.3–6.2)
ERYTHROCYTE [DISTWIDTH] IN BLOOD BY AUTOMATED COUNT: 15.1 % (ref 12.3–15.4)
FOLATE SERPL-MCNC: 11.4 NG/ML (ref 4.78–24.2)
GEN 5 2HR TROPONIN T REFLEX: 21 NG/L
GLUCOSE SERPL-MCNC: 187 MG/DL (ref 65–99)
GLUCOSE UR STRIP-MCNC: NEGATIVE MG/DL
HCT VFR BLD AUTO: 47.4 % (ref 34–46.6)
HGB BLD-MCNC: 15.8 G/DL (ref 12–15.9)
HGB UR QL STRIP.AUTO: ABNORMAL
HYALINE CASTS UR QL AUTO: ABNORMAL /LPF
IMM GRANULOCYTES # BLD AUTO: 0.04 10*3/MM3 (ref 0–0.05)
IMM GRANULOCYTES NFR BLD AUTO: 0.4 % (ref 0–0.5)
KETONES UR QL STRIP: ABNORMAL
L PNEUMO1 AG UR QL IA: POSITIVE
LEUKOCYTE ESTERASE UR QL STRIP.AUTO: ABNORMAL
LYMPHOCYTES # BLD AUTO: 0.57 10*3/MM3 (ref 0.7–3.1)
LYMPHOCYTES NFR BLD AUTO: 5.9 % (ref 19.6–45.3)
M PNEUMO IGM SER QL: NEGATIVE
MCH RBC QN AUTO: 31.7 PG (ref 26.6–33)
MCHC RBC AUTO-ENTMCNC: 33.3 G/DL (ref 31.5–35.7)
MCV RBC AUTO: 95 FL (ref 79–97)
MONOCYTES # BLD AUTO: 0.35 10*3/MM3 (ref 0.1–0.9)
MONOCYTES NFR BLD AUTO: 3.6 % (ref 5–12)
MRSA DNA SPEC QL NAA+PROBE: NORMAL
NEUTROPHILS NFR BLD AUTO: 8.66 10*3/MM3 (ref 1.7–7)
NEUTROPHILS NFR BLD AUTO: 89.8 % (ref 42.7–76)
NITRITE UR QL STRIP: NEGATIVE
NRBC BLD AUTO-RTO: 0 /100 WBC (ref 0–0.2)
PH UR STRIP.AUTO: 6 [PH] (ref 5–8)
PLATELET # BLD AUTO: 252 10*3/MM3 (ref 140–450)
PMV BLD AUTO: 10.7 FL (ref 6–12)
POTASSIUM SERPL-SCNC: 4.1 MMOL/L (ref 3.5–5.2)
PROCALCITONIN SERPL-MCNC: 2.56 NG/ML (ref 0–0.25)
PROT UR QL STRIP: ABNORMAL
QT INTERVAL: 331 MS
QTC INTERVAL: 442 MS
RBC # BLD AUTO: 4.99 10*6/MM3 (ref 3.77–5.28)
RBC # UR STRIP: ABNORMAL /HPF
REF LAB TEST METHOD: ABNORMAL
S PNEUM AG SPEC QL LA: NEGATIVE
SODIUM SERPL-SCNC: 134 MMOL/L (ref 136–145)
SP GR UR STRIP: 1.02 (ref 1–1.03)
SQUAMOUS #/AREA URNS HPF: ABNORMAL /HPF
TROPONIN T DELTA: -2 NG/L
TROPONIN T SERPL HS-MCNC: 23 NG/L
TSH SERPL DL<=0.05 MIU/L-ACNC: 3.34 UIU/ML (ref 0.27–4.2)
UROBILINOGEN UR QL STRIP: ABNORMAL
VIT B12 BLD-MCNC: 382 PG/ML (ref 211–946)
WBC # UR STRIP: ABNORMAL /HPF
WBC NRBC COR # BLD AUTO: 9.65 10*3/MM3 (ref 3.4–10.8)

## 2023-12-28 PROCEDURE — 84145 PROCALCITONIN (PCT): CPT | Performed by: INTERNAL MEDICINE

## 2023-12-28 PROCEDURE — 87641 MR-STAPH DNA AMP PROBE: CPT | Performed by: STUDENT IN AN ORGANIZED HEALTH CARE EDUCATION/TRAINING PROGRAM

## 2023-12-28 PROCEDURE — 99222 1ST HOSP IP/OBS MODERATE 55: CPT | Performed by: STUDENT IN AN ORGANIZED HEALTH CARE EDUCATION/TRAINING PROGRAM

## 2023-12-28 PROCEDURE — 94799 UNLISTED PULMONARY SVC/PX: CPT

## 2023-12-28 PROCEDURE — 87899 AGENT NOS ASSAY W/OPTIC: CPT | Performed by: STUDENT IN AN ORGANIZED HEALTH CARE EDUCATION/TRAINING PROGRAM

## 2023-12-28 PROCEDURE — 87086 URINE CULTURE/COLONY COUNT: CPT | Performed by: EMERGENCY MEDICINE

## 2023-12-28 PROCEDURE — 81001 URINALYSIS AUTO W/SCOPE: CPT | Performed by: EMERGENCY MEDICINE

## 2023-12-28 PROCEDURE — 84484 ASSAY OF TROPONIN QUANT: CPT | Performed by: EMERGENCY MEDICINE

## 2023-12-28 PROCEDURE — 25010000002 HEPARIN (PORCINE) PER 1000 UNITS: Performed by: STUDENT IN AN ORGANIZED HEALTH CARE EDUCATION/TRAINING PROGRAM

## 2023-12-28 PROCEDURE — 25010000002 METHYLPREDNISOLONE PER 125 MG: Performed by: EMERGENCY MEDICINE

## 2023-12-28 PROCEDURE — 25010000002 ENOXAPARIN PER 10 MG: Performed by: INTERNAL MEDICINE

## 2023-12-28 PROCEDURE — 25010000002 METHYLPREDNISOLONE PER 40 MG: Performed by: STUDENT IN AN ORGANIZED HEALTH CARE EDUCATION/TRAINING PROGRAM

## 2023-12-28 PROCEDURE — 36415 COLL VENOUS BLD VENIPUNCTURE: CPT | Performed by: STUDENT IN AN ORGANIZED HEALTH CARE EDUCATION/TRAINING PROGRAM

## 2023-12-28 PROCEDURE — 80048 BASIC METABOLIC PNL TOTAL CA: CPT | Performed by: STUDENT IN AN ORGANIZED HEALTH CARE EDUCATION/TRAINING PROGRAM

## 2023-12-28 PROCEDURE — 87449 NOS EACH ORGANISM AG IA: CPT | Performed by: STUDENT IN AN ORGANIZED HEALTH CARE EDUCATION/TRAINING PROGRAM

## 2023-12-28 PROCEDURE — 85025 COMPLETE CBC W/AUTO DIFF WBC: CPT | Performed by: STUDENT IN AN ORGANIZED HEALTH CARE EDUCATION/TRAINING PROGRAM

## 2023-12-28 PROCEDURE — 86738 MYCOPLASMA ANTIBODY: CPT | Performed by: STUDENT IN AN ORGANIZED HEALTH CARE EDUCATION/TRAINING PROGRAM

## 2023-12-28 PROCEDURE — 82140 ASSAY OF AMMONIA: CPT | Performed by: STUDENT IN AN ORGANIZED HEALTH CARE EDUCATION/TRAINING PROGRAM

## 2023-12-28 RX ORDER — ONDANSETRON 2 MG/ML
4 INJECTION INTRAMUSCULAR; INTRAVENOUS EVERY 6 HOURS PRN
Status: DISCONTINUED | OUTPATIENT
Start: 2023-12-28 | End: 2024-01-01 | Stop reason: HOSPADM

## 2023-12-28 RX ORDER — BISACODYL 10 MG
10 SUPPOSITORY, RECTAL RECTAL DAILY PRN
Status: DISCONTINUED | OUTPATIENT
Start: 2023-12-28 | End: 2024-01-01 | Stop reason: HOSPADM

## 2023-12-28 RX ORDER — BENZONATATE 100 MG/1
100 CAPSULE ORAL 3 TIMES DAILY PRN
Status: DISCONTINUED | OUTPATIENT
Start: 2023-12-28 | End: 2024-01-01 | Stop reason: HOSPADM

## 2023-12-28 RX ORDER — BUDESONIDE 0.5 MG/2ML
0.5 INHALANT ORAL
Status: DISCONTINUED | OUTPATIENT
Start: 2023-12-28 | End: 2024-01-01 | Stop reason: HOSPADM

## 2023-12-28 RX ORDER — LEVOTHYROXINE SODIUM 0.03 MG/1
25 TABLET ORAL
Status: DISCONTINUED | OUTPATIENT
Start: 2023-12-28 | End: 2024-01-01 | Stop reason: HOSPADM

## 2023-12-28 RX ORDER — AMOXICILLIN 250 MG
2 CAPSULE ORAL 2 TIMES DAILY
Status: DISCONTINUED | OUTPATIENT
Start: 2023-12-28 | End: 2024-01-01 | Stop reason: HOSPADM

## 2023-12-28 RX ORDER — SODIUM CHLORIDE 0.9 % (FLUSH) 0.9 %
10 SYRINGE (ML) INJECTION EVERY 12 HOURS SCHEDULED
Status: DISCONTINUED | OUTPATIENT
Start: 2023-12-28 | End: 2024-01-01 | Stop reason: HOSPADM

## 2023-12-28 RX ORDER — NITROGLYCERIN 0.4 MG/1
0.4 TABLET SUBLINGUAL
Status: DISCONTINUED | OUTPATIENT
Start: 2023-12-28 | End: 2023-12-28

## 2023-12-28 RX ORDER — HEPARIN SODIUM 5000 [USP'U]/ML
5000 INJECTION, SOLUTION INTRAVENOUS; SUBCUTANEOUS EVERY 12 HOURS SCHEDULED
Status: DISCONTINUED | OUTPATIENT
Start: 2023-12-28 | End: 2023-12-28

## 2023-12-28 RX ORDER — IPRATROPIUM BROMIDE AND ALBUTEROL SULFATE 2.5; .5 MG/3ML; MG/3ML
3 SOLUTION RESPIRATORY (INHALATION)
Status: DISCONTINUED | OUTPATIENT
Start: 2023-12-28 | End: 2023-12-28

## 2023-12-28 RX ORDER — BISACODYL 5 MG/1
5 TABLET, DELAYED RELEASE ORAL DAILY PRN
Status: DISCONTINUED | OUTPATIENT
Start: 2023-12-28 | End: 2024-01-01 | Stop reason: HOSPADM

## 2023-12-28 RX ORDER — ENOXAPARIN SODIUM 100 MG/ML
40 INJECTION SUBCUTANEOUS EVERY 24 HOURS
Status: DISCONTINUED | OUTPATIENT
Start: 2023-12-28 | End: 2024-01-01 | Stop reason: HOSPADM

## 2023-12-28 RX ORDER — FAMOTIDINE 20 MG/1
20 TABLET, FILM COATED ORAL NIGHTLY
Status: DISCONTINUED | OUTPATIENT
Start: 2023-12-28 | End: 2024-01-01 | Stop reason: HOSPADM

## 2023-12-28 RX ORDER — METHYLPREDNISOLONE SODIUM SUCCINATE 40 MG/ML
40 INJECTION, POWDER, LYOPHILIZED, FOR SOLUTION INTRAMUSCULAR; INTRAVENOUS EVERY 8 HOURS
Status: DISCONTINUED | OUTPATIENT
Start: 2023-12-28 | End: 2023-12-30

## 2023-12-28 RX ORDER — ARFORMOTEROL TARTRATE 15 UG/2ML
15 SOLUTION RESPIRATORY (INHALATION)
Status: DISCONTINUED | OUTPATIENT
Start: 2023-12-28 | End: 2024-01-01 | Stop reason: HOSPADM

## 2023-12-28 RX ORDER — POLYETHYLENE GLYCOL 3350 17 G/17G
17 POWDER, FOR SOLUTION ORAL DAILY PRN
Status: DISCONTINUED | OUTPATIENT
Start: 2023-12-28 | End: 2024-01-01 | Stop reason: HOSPADM

## 2023-12-28 RX ORDER — LEVOFLOXACIN 750 MG/1
750 TABLET, FILM COATED ORAL EVERY 24 HOURS
Status: DISCONTINUED | OUTPATIENT
Start: 2023-12-28 | End: 2023-12-28

## 2023-12-28 RX ORDER — IPRATROPIUM BROMIDE AND ALBUTEROL SULFATE 2.5; .5 MG/3ML; MG/3ML
3 SOLUTION RESPIRATORY (INHALATION) EVERY 4 HOURS PRN
Status: DISCONTINUED | OUTPATIENT
Start: 2023-12-28 | End: 2024-01-01 | Stop reason: HOSPADM

## 2023-12-28 RX ORDER — AZITHROMYCIN 250 MG/1
500 TABLET, FILM COATED ORAL EVERY 24 HOURS
Status: DISCONTINUED | OUTPATIENT
Start: 2023-12-29 | End: 2024-01-01 | Stop reason: HOSPADM

## 2023-12-28 RX ORDER — SODIUM CHLORIDE 9 MG/ML
40 INJECTION, SOLUTION INTRAVENOUS AS NEEDED
Status: DISCONTINUED | OUTPATIENT
Start: 2023-12-28 | End: 2024-01-01 | Stop reason: HOSPADM

## 2023-12-28 RX ORDER — SODIUM CHLORIDE 0.9 % (FLUSH) 0.9 %
10 SYRINGE (ML) INJECTION AS NEEDED
Status: DISCONTINUED | OUTPATIENT
Start: 2023-12-28 | End: 2024-01-01 | Stop reason: HOSPADM

## 2023-12-28 RX ORDER — ACETAMINOPHEN 325 MG/1
650 TABLET ORAL EVERY 6 HOURS PRN
Status: DISCONTINUED | OUTPATIENT
Start: 2023-12-28 | End: 2024-01-01 | Stop reason: HOSPADM

## 2023-12-28 RX ADMIN — IPRATROPIUM BROMIDE AND ALBUTEROL SULFATE 3 ML: .5; 3 SOLUTION RESPIRATORY (INHALATION) at 07:01

## 2023-12-28 RX ADMIN — HEPARIN SODIUM 5000 UNITS: 5000 INJECTION INTRAVENOUS; SUBCUTANEOUS at 08:23

## 2023-12-28 RX ADMIN — METHYLPREDNISOLONE SODIUM SUCCINATE 40 MG: 40 INJECTION, POWDER, LYOPHILIZED, FOR SOLUTION INTRAMUSCULAR; INTRAVENOUS at 16:23

## 2023-12-28 RX ADMIN — LEVOFLOXACIN 750 MG: 750 TABLET, FILM COATED ORAL at 11:13

## 2023-12-28 RX ADMIN — LEVOTHYROXINE SODIUM 25 MCG: 0.03 TABLET ORAL at 11:13

## 2023-12-28 RX ADMIN — FAMOTIDINE 20 MG: 20 TABLET, FILM COATED ORAL at 20:54

## 2023-12-28 RX ADMIN — DOCUSATE SODIUM 50 MG AND SENNOSIDES 8.6 MG 2 TABLET: 8.6; 5 TABLET, FILM COATED ORAL at 20:54

## 2023-12-28 RX ADMIN — METHYLPREDNISOLONE SODIUM SUCCINATE 40 MG: 40 INJECTION, POWDER, LYOPHILIZED, FOR SOLUTION INTRAMUSCULAR; INTRAVENOUS at 08:23

## 2023-12-28 RX ADMIN — BUDESONIDE 0.5 MG: 0.5 SUSPENSION RESPIRATORY (INHALATION) at 19:14

## 2023-12-28 RX ADMIN — METHYLPREDNISOLONE SODIUM SUCCINATE 125 MG: 125 INJECTION INTRAMUSCULAR; INTRAVENOUS at 00:17

## 2023-12-28 RX ADMIN — ARFORMOTEROL TARTRATE 15 MCG: 15 SOLUTION RESPIRATORY (INHALATION) at 19:14

## 2023-12-28 RX ADMIN — ENOXAPARIN SODIUM 40 MG: 100 INJECTION SUBCUTANEOUS at 16:23

## 2023-12-28 RX ADMIN — BUDESONIDE 0.5 MG: 0.5 SUSPENSION RESPIRATORY (INHALATION) at 10:41

## 2023-12-28 RX ADMIN — Medication 10 ML: at 20:54

## 2023-12-28 RX ADMIN — ARFORMOTEROL TARTRATE 15 MCG: 15 SOLUTION RESPIRATORY (INHALATION) at 10:41

## 2023-12-28 RX ADMIN — Medication 10 ML: at 08:23

## 2023-12-28 NOTE — ED NOTES
Assisted patient onto bedside commode for urine sample. Patient attempted to urinate but was unable to at this time. Assisted patient back into bed.

## 2023-12-28 NOTE — PLAN OF CARE
Problem: Adult Inpatient Plan of Care  Goal: Plan of Care Review  Outcome: Ongoing, Progressing  Flowsheets (Taken 12/28/2023 1625)  Progress: no change  Plan of Care Reviewed With: patient  Outcome Evaluation: Patient on 2.5L NC. Patient has no complaints at this time.  Goal: Patient-Specific Goal (Individualized)  Outcome: Ongoing, Progressing  Goal: Absence of Hospital-Acquired Illness or Injury  Outcome: Ongoing, Progressing  Intervention: Identify and Manage Fall Risk  Recent Flowsheet Documentation  Taken 12/28/2023 1623 by Kiara Davidson RN  Safety Promotion/Fall Prevention:   nonskid shoes/slippers when out of bed   safety round/check completed  Taken 12/28/2023 1500 by Kiara Davidson RN  Safety Promotion/Fall Prevention:   nonskid shoes/slippers when out of bed   safety round/check completed  Taken 12/28/2023 1300 by Kiara Davidson RN  Safety Promotion/Fall Prevention:   nonskid shoes/slippers when out of bed   safety round/check completed  Taken 12/28/2023 1113 by Kiara Davidson RN  Safety Promotion/Fall Prevention:   nonskid shoes/slippers when out of bed   safety round/check completed  Taken 12/28/2023 1030 by Kiara Davidson RN  Safety Promotion/Fall Prevention:   nonskid shoes/slippers when out of bed   safety round/check completed  Taken 12/28/2023 0826 by Kiara Davidson RN  Safety Promotion/Fall Prevention:   clutter free environment maintained   assistive device/personal items within reach   fall prevention program maintained   lighting adjusted   nonskid shoes/slippers when out of bed   room organization consistent   safety round/check completed  Taken 12/28/2023 0715 by Kiara Davidson RN  Safety Promotion/Fall Prevention:   nonskid shoes/slippers when out of bed   safety round/check completed  Intervention: Prevent and Manage VTE (Venous Thromboembolism) Risk  Recent Flowsheet Documentation  Taken 12/28/2023 0826 by Kiara Davidson RN  Range of Motion: active ROM (range of motion)  encouraged  Intervention: Prevent Infection  Recent Flowsheet Documentation  Taken 12/28/2023 0826 by Kiara Davidson RN  Infection Prevention:   cohorting utilized   environmental surveillance performed   equipment surfaces disinfected   hand hygiene promoted   personal protective equipment utilized   rest/sleep promoted   single patient room provided  Goal: Optimal Comfort and Wellbeing  Outcome: Ongoing, Progressing  Intervention: Provide Person-Centered Care  Recent Flowsheet Documentation  Taken 12/28/2023 0826 by Kiara Davidson RN  Trust Relationship/Rapport:   care explained   choices provided   emotional support provided   empathic listening provided   questions answered   questions encouraged   reassurance provided   thoughts/feelings acknowledged  Goal: Readiness for Transition of Care  Outcome: Ongoing, Progressing     Problem: Asthma Comorbidity  Goal: Maintenance of Asthma Control  Outcome: Ongoing, Progressing  Intervention: Maintain Asthma Symptom Control  Recent Flowsheet Documentation  Taken 12/28/2023 0826 by Kiara Davidson RN  Medication Review/Management:   medications reviewed   high-risk medications identified     Problem: Behavioral Health Comorbidity  Goal: Maintenance of Behavioral Health Symptom Control  Outcome: Ongoing, Progressing  Intervention: Maintain Behavioral Health Symptom Control  Recent Flowsheet Documentation  Taken 12/28/2023 0826 by Kiara Davidson RN  Medication Review/Management:   medications reviewed   high-risk medications identified     Problem: COPD (Chronic Obstructive Pulmonary Disease) Comorbidity  Goal: Maintenance of COPD Symptom Control  Outcome: Ongoing, Progressing  Intervention: Maintain COPD-Symptom Control  Recent Flowsheet Documentation  Taken 12/28/2023 0826 by Kiara Davidson RN  Medication Review/Management:   medications reviewed   high-risk medications identified     Problem: Skin Injury Risk Increased  Goal: Skin Health and Integrity  Outcome:  Ongoing, Progressing  Intervention: Optimize Skin Protection  Recent Flowsheet Documentation  Taken 12/28/2023 1623 by Kiara Davidson RN  Head of Bed (HOB) Positioning: HOB elevated  Taken 12/28/2023 1500 by Kiara Davidson RN  Head of Bed (HOB) Positioning: HOB elevated  Taken 12/28/2023 1300 by Kiara Davidson RN  Head of Bed (HOB) Positioning: HOB elevated  Taken 12/28/2023 1113 by Kiara Davidson RN  Head of Bed (HOB) Positioning: HOB elevated  Taken 12/28/2023 1030 by Kiara Davidson RN  Head of Bed (HOB) Positioning: HOB elevated  Taken 12/28/2023 0715 by Kiara Davidson RN  Head of Bed (HOB) Positioning: HOB elevated   Goal Outcome Evaluation:  Plan of Care Reviewed With: patient        Progress: no change  Outcome Evaluation: Patient on 2.5L NC. Patient has no complaints at this time.

## 2023-12-28 NOTE — H&P
[facility]    Patient Identification:  Name:  Selene Forrest  Age:  68 y.o.  Sex:  female  :  1955  MRN:  [id number]   Visit Number:  [id number]  Admit Date: 2023   Room number:    Primary Care Physician:  Gwen Chavez APRN    Date of Admission: 2023     Subjective     Chief complaint:    Chief Complaint   Patient presents with    Shortness of Breath    Altered Mental Status       History of presenting illness:    This is a 68-year-old female with a past medical history of depression, hypothyroidism, COPD, and abdominal hernia presenting with shortness of breath. Reports that she has been around her neighbor who has had flulike symptoms. States that she just has not felt very well and has been more short of breath than usual. She was brought in by the nephew because the neighbors stated that she has been somewhat confused.  She is not on oxygen at home and has no other complaints this time. At this time, she is AAO X2 complaining of shortness of breath and wheezing, but denies any CP or N/V.    In ED, blood pressure 149/84, heart rate 88, respiratory rate of 22, temperature 97.7 O2 saturation 87% on room air.  Labs on arrival showed a sodium 135, potassium 3.6, chloride 94, BUN 28, creatinine 0.9, WBC 12.2, hemoglobin 15.4, platelet count 272.  Chest x-ray showed no active disease but did show emphysema.    ---------------------------------------------------------------------------------------------------------------------   Review of Systems   Constitutional:  Positive for fatigue.   HENT:  Negative for drooling and nosebleeds.    Eyes:  Negative for pain.   Respiratory:  Positive for shortness of breath and wheezing.    Cardiovascular:  Negative for leg swelling.   Gastrointestinal:  Negative for anal bleeding and nausea.   Endocrine: Negative for polydipsia.   Genitourinary:  Negative for enuresis and pelvic pain.   Musculoskeletal:   Negative for back pain.   Allergic/Immunologic: Negative for food allergies.   Neurological:  Negative for syncope and headaches.   Psychiatric/Behavioral:  Negative for agitation.      ---------------------------------------------------------------------------------------------------------------------   Past Medical History:   Diagnosis Date    Abdominal hernia     COPD (chronic obstructive pulmonary disease)     SOB AT TIMES, HX SMOKER PER PT- NO INHALERS- DENIED ANY SOB AT THIS TIME    Depression     Disease of thyroid gland     Hx of bone density study 08/13/2019    Ventral hernia     Visit for screening mammogram 01/14/2019     Past Surgical History:   Procedure Laterality Date    BREAST AUGMENTATION Bilateral 1992    BREAST LUMPECTOMY      DILATATION AND CURETTAGE      HERNIA REPAIR      NOSE SURGERY      TUBAL ABDOMINAL LIGATION      VENTRAL HERNIA REPAIR N/A 4/19/2023    Procedure: VENTRAL / INCISIONAL HERNIA REPAIR LAPAROSCOPIC WITH [email or web address] ROBOT;  Surgeon: Dangelo Rios MD;  [place] [facility] [place] [facility] [place]  Service: Artoos - Activity Rocket;  Laterality: N/A;     Family History   Problem Relation Age of Onset    Stroke Mother     Hypertension Mother     Breast cancer Mother     Heart disease Father     Heart disease Brother     Stroke Maternal Grandfather     Heart disease Paternal Grandmother     Heart disease Paternal Grandfather     Breast cancer Other     Breast cancer Other     Diabetes Other      Social History     Socioeconomic History    Marital status: Single   Tobacco Use    Smoking status: Former     Packs/day: 2.00     Years: 40.00     Additional pack years: 0.00     Total pack years: 80.00     Types: Cigarettes    Smokeless tobacco: Never    Tobacco comments:     started at 21   Vaping Use    Vaping Use: Never used   Substance and Sexual Activity    Alcohol use: Yes     Comment: OCC    Drug use: Never    Sexual activity: Defer      ---------------------------------------------------------------------------------------------------------------------   Allergies:  Patient has no known allergies.  ---------------------------------------------------------------------------------------------------------------------   Medications below are reported home medications pulling from within the system; at this time, these medications have not been reconciled unless otherwise specified and are in the verification process for further verifcation as current home medications.      Prior to Admission Medications       Prescriptions Last Dose Informant Patient Reported? Taking?    Budeson-Glycopyrrol-Formoterol (Breztri Aerosphere) 160-9-4.8 MCG/ACT aerosol inhaler   No No    Inhale 2 puffs 2 (Two) Times a Day.    FLUoxetine (PROzac) 20 MG capsule   No No    TAKE 1 CAPSULE EVERY DAY    HYDROcodone-acetaminophen (NORCO) 5-325 MG per tablet   No No    Take 1-2 tablets by mouth Every 4 (Four) Hours As Needed (Pain).    HYDROcodone-acetaminophen (NORCO) 5-325 MG per tablet   Yes No    Patient not taking:  Reported on 9/20/2023    levothyroxine (SYNTHROID, LEVOTHROID) 25 MCG tablet   No No    TAKE 1 TABLET EVERY DAY          Objective     Vital Signs:  Temp:  [97.7 °F (36.5 °C)] 97.7 °F (36.5 °C)  Heart Rate:  [57-94] 93  Resp:  [20-22] 22  BP: (135-151)/(78-98) 149/84    Mean Arterial Pressure (Non-Invasive) for the past 24 hrs (Last 3 readings):   Noninvasive MAP (mmHg)   12/27/23 2200 104   12/27/23 2145 114   12/27/23 2130 97     SpO2:  [87 %-95 %] 87 %  on   ;   Device (Oxygen Therapy): room air  Body mass index is 29.05 kg/m².    Wt Readings from Last 3 Encounters:   12/27/23 81.6 kg (180 lb)   09/20/23 87.1 kg (192 lb)   05/09/23 83.9 kg (185 lb)      ----------------------------------------------------------------------------------------------------------------------  PHYSICAL EXAMINATION:  GENERAL: The patient is well developed and nontoxic.  HEENT:  "Nonicteric sclerae, PERRLA, EOMI. Oropharynx clear. Moist mucous membranes. Conjunctivae appear well perfused.  CHEST: Chest wall is nontender.  HEART: Regular rate and rhythm without murmurs.  LUNGS: Expiratory wheezing  ABDOMEN: Soft, positive bowel sounds, nontender, no organomegaly.  RECTAL: Deferred.  SKIN: No rash, no excessive bruising, petechiae, or purpura.  NEUROLOGIC: Cranial nerves II-XII intact without motor/sensory deficit.    ---------------------------------------------------------------------------------------------------------------------  --------------------------------------------------------------------------------------------------------------------  LABS:    CBC and coagulation:  Results from last 7 days   Lab Units 12/27/23 2111 12/27/23 1857   LACTATE, ARTERIAL mmol/L  --  1.16   WBC 10*3/mm3 12.23*  --    HEMOGLOBIN g/dL 15.4  --    HEMATOCRIT % 44.8  --    MCV fL 90.9  --    MCHC g/dL 34.4  --    PLATELETS 10*3/mm3 272  --      Acid/base balance:  Results from last 7 days   Lab Units 12/27/23 1857   PH, ARTERIAL pH units 7.440   PO2 ART mm Hg 75.6*   PCO2, ARTERIAL mm Hg 35.2   HCO3 ART mmol/L 23.4     Renal and electrolytes:  Results from last 7 days   Lab Units 12/27/23  2111 12/27/23  1857   SODIUM mmol/L 135*  --    SODIUM, ARTERIAL mmol/L  --  133.8*   POTASSIUM mmol/L 3.6  --    CHLORIDE mmol/L 94*  --    CO2 mmol/L 24.1  --    BUN mg/dL 28*  --    CREATININE mg/dL 0.95  --    CALCIUM mg/dL 9.5  --    IONIZED CALCIUM mmol/L  --  1.18   GLUCOSE mg/dL 159*  --    GLUCOSE, ARTERIAL mg/dL  --  132*     Estimated Creatinine Clearance: 61.9 mL/min (by C-G formula based on SCr of 0.95 mg/dL).    Liver and pancreatic function:  Results from last 7 days   Lab Units 12/27/23  2111   ALBUMIN g/dL 3.4*   BILIRUBIN mg/dL 0.4   ALK PHOS U/L 82   AST (SGOT) U/L 115*   ALT (SGPT) U/L 94*     Endocrine function:  No results found for: \"HGBA1C\"  Point of care bedside glucose levels:      Lab " "Results   Component Value Date    TSH 3.130 03/16/2023    FREET4 1.07 03/16/2023     Cardiac:  Results from last 7 days   Lab Units 12/27/23  2332 12/27/23 2111   HSTROP T ng/L 23* 23*   PROBNP pg/mL  --  593.3       Cultures:  No results found for: \"COLORU\", \"CLARITYU\", \"SPECGRAV\", \"PHUR\", \"PROTEINUR\", \"GLUCOSEU\", \"KETONESU\", \"BLOODU\", \"NITRITEU\", \"LEUKOCYTESUR\", \"BILIRUBINUR\", \"UROBILINOGEN\", \"RBCUA\", \"WBCUA\", \"BACTERIA\", \"UACOMMENT\"  Microbiology Results (last 10 days)       Procedure Component Value - Date/Time    COVID-19, FLU A/B, RSV PCR 1 HR TAT - Swab, Nasopharynx [[id number]]  (Normal) Collected: 12/27/23 2240    Lab Status: Final result Specimen: Swab from Nasopharynx Updated: 12/27/23 2326     COVID19 Not Detected     Influenza A PCR Not Detected     Influenza B PCR Not Detected     RSV, PCR Not Detected    Narrative:      Fact sheet for providers: https://www.fda.gov/media/253319/download    Fact sheet for patients: https://www.fda.gov/media/898350/download    Test performed by PCR.    COVID PRE-OP / PRE-PROCEDURE SCREENING ORDER (NO ISOLATION) - Swab, Nasopharynx [[id number]]  (Normal) Collected: 12/27/23 2116    Lab Status: Final result Specimen: Swab from Nasopharynx Updated: 12/27/23 2205    Narrative:      The following orders were created for panel order COVID PRE-OP / PRE-PROCEDURE SCREENING ORDER (NO ISOLATION) - Swab, Nasopharynx.  Procedure                               Abnormality         Status                     ---------                               -----------         ------                     COVID-19,CEPHEID/DAVIAN,CO...[[id number]]  Normal              Final result                 Please view results for these tests on the individual orders.    COVID-19,CEPHEID/DAVIAN,COR/YOLANDA/PAD/ROMAN/LAG IN-HOUSE,NP SWAB IN TRANSPORT MEDIA 1 HR TAT, RT-PCR - Swab, Nasopharynx [[id number]]  (Normal) Collected: 12/27/23 2116    Lab Status: Final result Specimen: Swab from Nasopharynx Updated: 12/27/23 2205 " "    COVID19 Not Detected    Narrative:      Fact sheet for providers: https://www.fda.gov/media/564608/download     Fact sheet for patients: https://www.fda.gov/media/491576/download  Fact sheet for providers: https://www.fda.gov/media/954369/download     Fact sheet for patients: https://www.fda.gov/media/083616/download            No results found for: \"PREGTESTUR\", \"PREGSERUM\", \"HCG\", \"HCGQUANT\"  Pain Management Panel           No data to display                I have personally looked at the labs and they are summarized above.  ----------------------------------------------------------------------------------------------------------------------  Detailed radiology reports for the last 24 hours:    Imaging Results (Last 24 Hours)       Procedure Component Value Units Date/Time    XR Chest 1 View [[id number]] Collected: 12/27/23 2003     Updated: 12/27/23 2006    Narrative:      PROCEDURE: XR CHEST 1 VW     COMPARISON: [facility], , XR CHEST PA AND LATERAL, 9/13/2021, 16:03.     INDICATIONS: LEFT SIDED CHEST PAIN     FINDINGS:   The heart is normal in size.  The lungs have a hyperexpanded appearance consistent with emphysema.     Chronic changes at the lung bases appear stable.     Bony structures appear intact.       Impression:         Emphysema.     No active disease is seen.                  MILY KUMARI MD         Electronically Signed and Approved By: MILY KUMARI MD on 12/27/2023 at 20:03                           Final impressions for the last 30 days of radiology reports:    XR Chest 1 View    Result Date: 12/27/2023    Emphysema.  No active disease is seen.       MILY KUMARI MD       Electronically Signed and Approved By: MILY KUMARI MD on 12/27/2023 at 20:03                Assessment & Plan     This is a 68-year-old female with a past medical history of depression, hypothyroidism, COPD, and abdominal hernia presenting with shortness of breath.     Assessment:  COPD " exacerbation  Respiratory failure with hypoxia  Altered mental status  Depression  Hypothyroidism  Abdominal hernia    Plan:  -Admit to inpatient  -DuoNebs every 4 hours  -Titrate oxygen to SaO2 of 88% 92%  -Solu-Medrol 40 q8h  -Will hold off on starting any antibiotic at this time  -Monitor SaO2  -Follow-up TSH, B12, ammonia, urine drug screen and folate      Rosalio Fulton MD  [facility]  12/28/23  00:03 EST

## 2023-12-28 NOTE — PROGRESS NOTES
Respiratory Therapist Broncho-Pulmonary Hygiene Progress Note      Patient Name:  Selene Forrest  YOB: 1955    Selene Forrest meets the qualification for Level 2 of the Bronco-Pulmonary Hygiene Protocol. This was based on my daily patient assessment and includes review of chest x-ray results, cough ability and quality, oxygenation, secretions or risk for secretion development and patient mobility.     Broncho-Pulmonary Hygiene Assessment:    Level of Movement: Actively changing positions-requires assistance  Disoriented/Follows Commands    Breath Sounds: Diminished and/or coarse rhonchi    Cough: Ineffective, weak or not cough efforts    Chest X-Ray: Possible signs of consolidation and/or atelectasis or clear.     Sputum Productions: None or small amount of thin or watery secretions with effective cough    History and Physical: New onset of bronchitis or existing chronic pulmonary conditions.  **(not in an exacerbation)    SpO2 to Oxygen Need: greater than 92% on room air or  less than 3L nasal canula    Current SpO2 is: 90% on 2L    Based on this information, I have completed the following interventions: Aerobika with bronchodialtor medication or TID      Electronically signed by Rhoda Butcher RRT, 12/28/23, 10:34 AM EST.

## 2023-12-29 LAB
ALBUMIN SERPL-MCNC: 3 G/DL (ref 3.5–5.2)
ALBUMIN/GLOB SERPL: 0.8 G/DL
ALP SERPL-CCNC: 78 U/L (ref 39–117)
ALT SERPL W P-5'-P-CCNC: 141 U/L (ref 1–33)
ANION GAP SERPL CALCULATED.3IONS-SCNC: 12.6 MMOL/L (ref 5–15)
AST SERPL-CCNC: 70 U/L (ref 1–32)
BACTERIA SPEC AEROBE CULT: NORMAL
BILIRUB SERPL-MCNC: 0.2 MG/DL (ref 0–1.2)
BUN SERPL-MCNC: 26 MG/DL (ref 8–23)
BUN/CREAT SERPL: 28.3 (ref 7–25)
CALCIUM SPEC-SCNC: 9.5 MG/DL (ref 8.6–10.5)
CHLORIDE SERPL-SCNC: 96 MMOL/L (ref 98–107)
CO2 SERPL-SCNC: 23.4 MMOL/L (ref 22–29)
CREAT SERPL-MCNC: 0.92 MG/DL (ref 0.57–1)
EGFRCR SERPLBLD CKD-EPI 2021: 68.4 ML/MIN/1.73
GLOBULIN UR ELPH-MCNC: 3.9 GM/DL
GLUCOSE SERPL-MCNC: 198 MG/DL (ref 65–99)
POTASSIUM SERPL-SCNC: 4.3 MMOL/L (ref 3.5–5.2)
PROT SERPL-MCNC: 6.9 G/DL (ref 6–8.5)
SODIUM SERPL-SCNC: 132 MMOL/L (ref 136–145)

## 2023-12-29 PROCEDURE — 25010000002 ENOXAPARIN PER 10 MG: Performed by: INTERNAL MEDICINE

## 2023-12-29 PROCEDURE — 94799 UNLISTED PULMONARY SVC/PX: CPT

## 2023-12-29 PROCEDURE — 25010000002 METHYLPREDNISOLONE PER 40 MG: Performed by: STUDENT IN AN ORGANIZED HEALTH CARE EDUCATION/TRAINING PROGRAM

## 2023-12-29 PROCEDURE — 99232 SBSQ HOSP IP/OBS MODERATE 35: CPT | Performed by: FAMILY MEDICINE

## 2023-12-29 PROCEDURE — 97166 OT EVAL MOD COMPLEX 45 MIN: CPT

## 2023-12-29 PROCEDURE — 80053 COMPREHEN METABOLIC PANEL: CPT | Performed by: INTERNAL MEDICINE

## 2023-12-29 PROCEDURE — 97161 PT EVAL LOW COMPLEX 20 MIN: CPT

## 2023-12-29 PROCEDURE — 94664 DEMO&/EVAL PT USE INHALER: CPT

## 2023-12-29 RX ADMIN — ENOXAPARIN SODIUM 40 MG: 100 INJECTION SUBCUTANEOUS at 15:21

## 2023-12-29 RX ADMIN — METHYLPREDNISOLONE SODIUM SUCCINATE 40 MG: 40 INJECTION, POWDER, LYOPHILIZED, FOR SOLUTION INTRAMUSCULAR; INTRAVENOUS at 23:09

## 2023-12-29 RX ADMIN — DOCUSATE SODIUM 50 MG AND SENNOSIDES 8.6 MG 2 TABLET: 8.6; 5 TABLET, FILM COATED ORAL at 08:49

## 2023-12-29 RX ADMIN — METHYLPREDNISOLONE SODIUM SUCCINATE 40 MG: 40 INJECTION, POWDER, LYOPHILIZED, FOR SOLUTION INTRAMUSCULAR; INTRAVENOUS at 08:49

## 2023-12-29 RX ADMIN — FAMOTIDINE 20 MG: 20 TABLET, FILM COATED ORAL at 20:54

## 2023-12-29 RX ADMIN — BUDESONIDE 0.5 MG: 0.5 SUSPENSION RESPIRATORY (INHALATION) at 19:01

## 2023-12-29 RX ADMIN — AZITHROMYCIN DIHYDRATE 500 MG: 250 TABLET ORAL at 08:49

## 2023-12-29 RX ADMIN — Medication 10 ML: at 08:50

## 2023-12-29 RX ADMIN — Medication 10 ML: at 20:55

## 2023-12-29 RX ADMIN — METHYLPREDNISOLONE SODIUM SUCCINATE 40 MG: 40 INJECTION, POWDER, LYOPHILIZED, FOR SOLUTION INTRAMUSCULAR; INTRAVENOUS at 00:30

## 2023-12-29 RX ADMIN — BUDESONIDE 0.5 MG: 0.5 SUSPENSION RESPIRATORY (INHALATION) at 07:41

## 2023-12-29 RX ADMIN — LEVOTHYROXINE SODIUM 25 MCG: 0.03 TABLET ORAL at 05:59

## 2023-12-29 RX ADMIN — DOCUSATE SODIUM 50 MG AND SENNOSIDES 8.6 MG 2 TABLET: 8.6; 5 TABLET, FILM COATED ORAL at 20:54

## 2023-12-29 RX ADMIN — METHYLPREDNISOLONE SODIUM SUCCINATE 40 MG: 40 INJECTION, POWDER, LYOPHILIZED, FOR SOLUTION INTRAMUSCULAR; INTRAVENOUS at 15:21

## 2023-12-29 RX ADMIN — ARFORMOTEROL TARTRATE 15 MCG: 15 SOLUTION RESPIRATORY (INHALATION) at 07:41

## 2023-12-29 RX ADMIN — ARFORMOTEROL TARTRATE 15 MCG: 15 SOLUTION RESPIRATORY (INHALATION) at 19:01

## 2023-12-29 NOTE — CASE MANAGEMENT/SOCIAL WORK
"COPD Education    Age: 68 y.o.  Sex: female  BMI:Body mass index is 29.05 kg/m².     Past Medical Hx: depression, hypothyroidism, COPD, and abdominal hernia     Smoking Hx: Social History    Tobacco Use      Smoking status: Former        Packs/day: 2.00        Years: 40.00        Additional pack years: 0.00        Total pack years: 80.00        Types: Cigarettes      Smokeless tobacco: Never      Tobacco comments: started at 21    Social History     Substance and Sexual Activity   Drug Use Never      Home Equipment Used:  no respiratory equipment    ADL's: independent Lives:  in apartment alone    Daily symptoms: SOA at  w/exertion, nonproductive cough    Airway Clearance methods utilized: none    Have you ever attended Pulmonary Rehab?  no    Last PFT:  never    Have you ever had a sleep study/PSG?  no    Last Arterial Blood Gas:   Lab Results   Component Value Date    PHART 7.440 12/27/2023    ZZQ8YDA 35.2 12/27/2023    PO2ART 75.6 (L) 12/27/2023    OIF0HHA 23.4 12/27/2023    BASEEXCESS -0.1 12/27/2023    R2UIKSEQ 94.4 (L) 12/27/2023      Chest X-Ray findings: Emphysema. No active disease is seen.         Home Medications:  Medications Prior to Admission   Medication Sig Dispense Refill Last Dose    FLUoxetine (PROzac) 20 MG capsule TAKE 1 CAPSULE EVERY DAY (Patient taking differently: Take 2 capsules by mouth Daily.) 90 capsule 1 12/27/2023    levothyroxine (SYNTHROID, LEVOTHROID) 25 MCG tablet TAKE 1 TABLET EVERY DAY (Patient taking differently: Take 1 tablet by mouth Every Morning.) 90 tablet 1 12/27/2023    Budeson-Glycopyrrol-Formoterol (Breztri Aerosphere) 160-9-4.8 MCG/ACT aerosol inhaler Inhale 2 puffs 2 (Two) Times a Day. 3 each 2 Unknown     Ms. Forrest reports known history of COPD but has never had PFT and does not follow with a pulmonologist. She states she has an inhaler (Breztri per home med mar) that she takes as needed.      COPD education was given to Ms. Forrest via the booklet, \"Understanding and " "Living with COPD\". Discussion of the COPD zones (Green/Yellow/Red) was competed with emphasizes on what to do in the yellow and red zones. COPD action plan was reviewed with instruction on rescue and maintenance medications, the function of each and the importance of using them as prescribed.    Ms. Forrest does not have home oxygen or rescue inhaler. If patient discharges to home, not rehab, walk test will be needed.     COPD clinic referral placed.    Dalila Lilly, RRT   RT   12/29/23  12:28 EST        "

## 2023-12-29 NOTE — PLAN OF CARE
Goal Outcome Evaluation:  Plan of Care Reviewed With: patient           Outcome Evaluation: Pt urine strong odor and cloudy. Up to bsc with 1 asst. no c/o this shift.

## 2023-12-29 NOTE — PLAN OF CARE
Goal Outcome Evaluation:  Plan of Care Reviewed With: patient        Progress: no change  Outcome Evaluation: Patient presents with limitations of decreased functional endurance, strength, balance and limited safety/insight into own deficits requiring need for skilled OT services to facilitate return to prior level of function with ADLs.      Anticipated Discharge Disposition (OT): skilled nursing facility

## 2023-12-29 NOTE — THERAPY EVALUATION
Acute Care - Physical Therapy Initial Evaluation   [facility]     Patient Name: Selene Forrest  : 1955  MRN: [id number]  Today's Date: 2023      Visit Dx:     ICD-10-CM ICD-9-CM   1. Acute respiratory failure with hypoxia  J96.01 518.81   2. COPD exacerbation  J44.1 491.21   3. Decreased activities of daily living (ADL)  Z78.9 V49.89   4. Difficulty walking  R26.2 719.7     Patient Active Problem List   Diagnosis    Abnormal bone density screening    Depression    Incisional hernia, without obstruction or gangrene    COPD exacerbation     Past Medical History:   Diagnosis Date    Abdominal hernia     COPD (chronic obstructive pulmonary disease)     SOB AT TIMES, HX SMOKER PER PT- NO INHALERS- DENIED ANY SOB AT THIS TIME    Depression     Disease of thyroid gland     Hx of bone density study 2019    Ventral hernia     Visit for screening mammogram 2019     Past Surgical History:   Procedure Laterality Date    BREAST AUGMENTATION Bilateral     BREAST LUMPECTOMY      DILATATION AND CURETTAGE      HERNIA REPAIR      NOSE SURGERY      TUBAL ABDOMINAL LIGATION      VENTRAL HERNIA REPAIR N/A 2023    Procedure: VENTRAL / INCISIONAL HERNIA REPAIR LAPAROSCOPIC WITH [employer] ROBOT;  Surgeon: Dangelo Rios MD;  Location: [facility] OR [facility] – [place];  Service: Robotics - DaVinci;  Laterality: N/A;     PT Assessment (last 12 hours)       PT Evaluation and Treatment       Row Name 23 1300          Physical Therapy Time and Intention    Document Type evaluation  -AV     Mode of Treatment individual therapy;physical therapy  -AV     Symptoms Noted During/After Treatment shortness of breath  -AV       Row Name 23 1300          General Information    Patient Profile Reviewed yes  -AV     Patient Observations alert;cooperative;agree to therapy  -AV     Prior Level of Function independent:;all household mobility;gait;transfer;ADL's  Ambulated with rollator. No home O2.  -AV     Equipment Currently  Used at Home rollator  -AV     Existing Precautions/Restrictions fall;oxygen therapy device and L/min  -AV       Row Name 12/29/23 1300          Living Environment    Current Living Arrangements home  -AV     Home Accessibility stairs to enter home  -AV     People in Home alone  -AV       Row Name 12/29/23 1300          Home Main Entrance    Number of Stairs, Main Entrance two  -AV     Stair Railings, Main Entrance railing on left side (ascending)  -AV       Row Name 12/29/23 1300          Pain    Pretreatment Pain Rating 0/10 - no pain  -AV     Posttreatment Pain Rating 0/10 - no pain  -AV       Row Name 12/29/23 1300          Cognition    Orientation Status (Cognition) oriented x 3  -AV       Row Name 12/29/23 1300          Range of Motion (ROM)    Range of Motion bilateral lower extremities;ROM is WFL  -AV       Row Name 12/29/23 1300          Strength (Manual Muscle Testing)    Strength (Manual Muscle Testing) bilateral lower extremities  4-/5  -AV       Row Name 12/29/23 1300          Bed Mobility    Comment, (Bed Mobility) Patient seated upright in recliner upon therapist entry.  -AV       Row Name 12/29/23 1300          Transfers    Transfers sit-stand transfer;stand-sit transfer  -AV       Row Name 12/29/23 1300          Sit-Stand Transfer    Sit-Stand [place] (Transfers) contact guard  -AV     Assistive Device (Sit-Stand Transfers) walker, front-wheeled  -AV       Row Name 12/29/23 1300          Stand-Sit Transfer    Stand-Sit [place] (Transfers) contact guard  -AV     Assistive Device (Stand-Sit Transfers) walker, front-wheeled  -AV       Row Name 12/29/23 1300          Gait/Stairs (Locomotion)    Gait/Stairs Locomotion gait/ambulation independence;gait/ambulation assistive device;distance ambulated  -AV     [place] Level (Gait) contact guard  -AV     Assistive Device (Gait) walker, front-wheeled  -AV     Distance in Feet (Gait) 60  -AV     Pattern (Gait) step-through  -AV       Row Name  12/29/23 1300          Safety Issues, Functional Mobility    Impairments Affecting Function (Mobility) balance;endurance/activity tolerance;strength;shortness of breath  -AV       Row Name 12/29/23 1300          Balance    Balance Assessment standing dynamic balance  -AV     Dynamic Standing Balance contact guard  -AV     Position/Device Used, Standing Balance supported;walker, front-wheeled  -AV       Row Name 12/29/23 1300          Plan of Care Review    Plan of Care Reviewed With patient  -AV     Progress no change  -AV     Outcome Evaluation Patient presents with deficits in balance, endurance, transfers, and ambulation. Patient will benefit from skilled PT services to address these mobility deficits and decrease risk of falls.  -AV       Row Name 12/29/23 1300          Positioning and Restraints    Pre-Treatment Position sitting in chair/recliner  -AV     Post Treatment Position chair  -AV     In Chair reclined;call light within reach;encouraged to call for assist;exit alarm on  with PCA  -AV       Row Name 12/29/23 1300          Therapy Assessment/Plan (PT)    Rehab Potential (PT) good, to achieve stated therapy goals  -AV     Criteria for Skilled Interventions Met (PT) yes;meets criteria  -AV     Therapy Frequency (PT) daily  -AV     Predicted Duration of Therapy Intervention (PT) 10 days  -AV     Problem List (PT) problems related to;balance;mobility;strength  -AV     Activity Limitations Related to Problem List (PT) unable to transfer safely;unable to ambulate safely  -AV       Row Name 12/29/23 1300          PT Evaluation Complexity    History, PT Evaluation Complexity 1-2 personal factors and/or comorbidities  -AV     Examination of Body Systems (PT Eval Complexity) total of 4 or more elements  -AV     Clinical Presentation (PT Evaluation Complexity) stable  -AV     Clinical Decision Making (PT Evaluation Complexity) low complexity  -AV     Overall Complexity (PT Evaluation Complexity) low complexity  -AV        Row Name 12/29/23 1300          Therapy Plan Review/Discharge Plan (PT)    Therapy Plan Review (PT) evaluation/treatment results reviewed;patient  -AV       Row Name 12/29/23 1300          Physical Therapy Goals    Bed Mobility Goal Selection (PT) bed mobility, PT goal 1  -AV     Transfer Goal Selection (PT) transfer, PT goal 1  -AV     Gait Training Goal Selection (PT) gait training, PT goal 1  -AV       Row Name 12/29/23 1300          Bed Mobility Goal 1 (PT)    Activity/Assistive Device (Bed Mobility Goal 1, PT) sit to supine/supine to sit  -AV     [place] Level/Cues Needed (Bed Mobility Goal 1, PT) modified independence  -AV     Time Frame (Bed Mobility Goal 1, PT) 10 days  -AV       Row Name 12/29/23 1300          Transfer Goal 1 (PT)    Activity/Assistive Device (Transfer Goal 1, PT) sit-to-stand/stand-to-sit;bed-to-chair/chair-to-bed;walker, rolling  -AV     [place] Level/Cues Needed (Transfer Goal 1, PT) modified independence  -AV     Time Frame (Transfer Goal 1, PT) 10 days  -AV       Row Name 12/29/23 1300          Gait Training Goal 1 (PT)    Activity/Assistive Device (Gait Training Goal 1, PT) gait (walking locomotion);assistive device use;walker, rolling  -AV     [place] Level (Gait Training Goal 1, PT) modified independence  -AV     Distance (Gait Training Goal 1, PT) 150  -AV     Time Frame (Gait Training Goal 1, PT) 10 days  -AV               User Key  (r) = Recorded By, (t) = Taken By, (c) = Cosigned By      Initials Name Provider Type    Miguel Arzate, PT Physical Therapist                    Physical Therapy Education       Title: PT OT SLP Therapies (In Progress)       Topic: Physical Therapy (In Progress)       Point: Mobility training (Done)       Learning Progress Summary             Patient Acceptance, E,TB, VU by AV at 12/29/2023 1411                         Point: Home exercise program (Not Started)       Learner Progress:  Not documented in this visit.               Point: Body mechanics (Done)       Learning Progress Summary             Patient Acceptance, E,TB, VU by AV at 12/29/2023 1411                         Point: Precautions (Done)       Learning Progress Summary             Patient Acceptance, E,TB, VU by AV at 12/29/2023 1411                                         User Key       Initials Effective Dates Name Provider Type Discipline     06/11/21 -  Miguel Zhu, PT Physical Therapist PT                  PT Recommendation and Plan  Anticipated Discharge Disposition (PT): skilled nursing facility  Planned Therapy Interventions (PT): balance training, bed mobility training, gait training, home exercise program, neuromuscular re-education, strengthening, transfer training  Therapy Frequency (PT): daily  Plan of Care Reviewed With: patient  Progress: no change  Outcome Evaluation: Patient presents with deficits in balance, endurance, transfers, and ambulation. Patient will benefit from skilled PT services to address these mobility deficits and decrease risk of falls.   Outcome Measures       Row Name 12/29/23 1400             How much help from another person do you currently need...    Turning from your back to your side while in flat bed without using bedrails? 3  -AV      Moving from lying on back to sitting on the side of a flat bed without bedrails? 3  -AV      Moving to and from a bed to a chair (including a wheelchair)? 3  -AV      Standing up from a chair using your arms (e.g., wheelchair, bedside chair)? 3  -AV      Climbing 3-5 steps with a railing? 3  -AV      To walk in hospital room? 3  -AV      AM-PAC 6 Clicks Score (PT) 18  -AV      Highest Level of Mobility Goal 6 --> Walk 10 steps or more  -AV         Functional Assessment    Outcome Measure Options AM-PAC 6 Clicks Basic Mobility (PT)  -AV                User Key  (r) = Recorded By, (t) = Taken By, (c) = Cosigned By      Initials Name Provider Type    AV Miguel Zhu, PT Physical  Therapist                     Time Calculation:    PT Charges       Row Name 12/29/23 1411             Time Calculation    PT Received On 12/29/23  -AV      PT Goal Re-Cert Due Date 01/07/24  -AV         Untimed Charges    PT Eval/Re-eval Minutes 35  -AV         Total Minutes    Untimed Charges Total Minutes 35  -AV       Total Minutes 35  -AV                User Key  (r) = Recorded By, (t) = Taken By, (c) = Cosigned By      Initials Name Provider Type    AV Miguel Zhu, PT Physical Therapist                  Therapy Charges for Today       Code Description Service Date Service Provider Modifiers Qty    68050743152 HC PT EVAL LOW COMPLEXITY 3 12/29/2023 Miguel Zhu, PT GP 1            PT G-Codes  Outcome Measure Options: AM-PAC 6 Clicks Basic Mobility (PT)  AM-PAC 6 Clicks Score (PT): 18  AM-PAC 6 Clicks Score (OT): 21    Miguel Zhu PT  12/29/2023

## 2023-12-29 NOTE — PROGRESS NOTES
[facility]   Hospitalist Progress Note  Date: 2023  Patient Name: Selene Forrest  : 1955  MRN: [id number]  Date of admission: 2023      Subjective   Subjective     Summary: 68-year-old female with a past medical history of depression, hypothyroidism, COPD, and abdominal hernia presenting with shortness of breath. Reports that she has been around her neighbor who has had flulike symptoms. States that she just has not felt very well and has been more short of breath than usual. She was brought in by the nephew because the neighbors stated that she has been somewhat confused.  She is not on oxygen at home and has no other complaints this time. At this time, she is AAO X2 complaining of shortness of breath and wheezing, but denies any CP or N/V.     In ED, blood pressure 149/84, heart rate 88, respiratory rate of 22, temperature 97.7 O2 saturation 87% on room air.  Labs on arrival showed a sodium 135, potassium 3.6, chloride 94, BUN 28, creatinine 0.9, WBC 12.2, hemoglobin 15.4, platelet count 272.  Chest x-ray showed no active disease but did show emphysema.    Interval Followup: Feeling a lot better today but still hypoxic.  Requiring 4 L supplemental oxygen per nasal cannula.  Still having a lot of wheezing.  Confusion improved.  Alert and oriented.      Objective   Objective     Vitals:   Temp:  [97.2 °F (36.2 °C)-98.5 °F (36.9 °C)] 97.9 °F (36.6 °C)  Heart Rate:  [69-85] 69  Resp:  [18-20] 18  BP: (133-160)/(70-84) 133/84  Flow (L/min):  [2.5-4] 4  Physical Exam    Constitutional: Awake, alert, no acute distress   Respiratory: Wheezes to auscultation bilaterally, nonlabored respirations    Cardiovascular: RRR, no murmurs, rubs, or gallops, palpable pedal pulses bilaterally   Gastrointestinal: Positive bowel sounds, soft, nontender, nondistended   Psychiatric: Appropriate affect, cooperative   Neurologic: Oriented x 3, speech clear          Assessment & Plan   Assessment / Plan      Assessment/Plan:  COPD exacerbation  Respiratory failure with hypoxia  Altered mental status. Resolved  Depression  Hypothyroidism  Abdominal hernia     Plan:  Remain inpatient  DuoNebs every 4 hours  Titrate oxygen to SaO2 of 88% 92%  Solu-Medrol 40 q8h.  Transition to PO prednisone in am  Continue Abx  Monitor SaO2  TSH, B12, ammonia, and folate WNL     Discussed plan with RN.    DVT prophylaxis:  Medical DVT prophylaxis orders are present.    CODE STATUS:   Level Of Support Discussed With: Patient  Code Status (Patient has no pulse and is not breathing): CPR (Attempt to Resuscitate)  Medical Interventions (Patient has pulse or is breathing): Full Support

## 2023-12-29 NOTE — THERAPY EVALUATION
Patient Name: Selene Forrest  : 1955    MRN: [id number]                              Today's Date: 2023       Admit Date: 2023    Visit Dx:     ICD-10-CM ICD-9-CM   1. Acute respiratory failure with hypoxia  J96.01 518.81   2. COPD exacerbation  J44.1 491.21   3. Decreased activities of daily living (ADL)  Z78.9 V49.89     Patient Active Problem List   Diagnosis    Abnormal bone density screening    Depression    Incisional hernia, without obstruction or gangrene    COPD exacerbation     Past Medical History:   Diagnosis Date    Abdominal hernia     COPD (chronic obstructive pulmonary disease)     SOB AT TIMES, HX SMOKER PER PT- NO INHALERS- DENIED ANY SOB AT THIS TIME    Depression     Disease of thyroid gland     Hx of bone density study 2019    Ventral hernia     Visit for screening mammogram 2019     Past Surgical History:   Procedure Laterality Date    BREAST AUGMENTATION Bilateral     BREAST LUMPECTOMY      DILATATION AND CURETTAGE      HERNIA REPAIR      NOSE SURGERY      TUBAL ABDOMINAL LIGATION      VENTRAL HERNIA REPAIR N/A 2023    Procedure: VENTRAL / INCISIONAL HERNIA REPAIR LAPAROSCOPIC WITH [employer] ROBOT;  Surgeon: Dangelo Rios MD;  [place] [facility] [place] [facility] [place]  Service: Robotics - [place];  Laterality: N/A;      General Information       Row Name 23 0804          OT Time and Intention    Document Type evaluation  -EG     Mode of Treatment individual therapy;occupational therapy  -EG       Row Name 23 0804          General Information    Patient Profile Reviewed yes  Patient reports using rollator walker at home; tub shower with no seat; no home O2 prior; ind. all ADL's; some confusion noted possible inaccurate info from patient; states she owns no other equipment  -EG     Prior Level of Function independent:;ADL's;driving;all household mobility;home management  -EG     Existing Precautions/Restrictions fall;oxygen therapy device and L/min   -EG     Barriers to Rehab none identified  -EG       Row Name 12/29/23 0804          Occupational Profile    Reason for Services/Referral (Occupational Profile) Patient is a 68-year-old female admitted to [facility] on 12/27/2023.  OT was consulted due to COPD exacerbation with shortness of breath.  OT to assess for new onset of deficits and limitations in ADL/transfer performance.  No previous OT services for current condition.  -EG       Row Name 12/29/23 0804          Living Environment    People in Home alone  -EG       Row Name 12/29/23 0804          Home Main Entrance    Number of Stairs, Main Entrance two  -EG     Stair Railings, Main Entrance railing on left side (ascending)  -EG       Row Name 12/29/23 0804          Safety Issues, Functional Mobility    Safety Issues Affecting Function (Mobility) insight into deficits/self-awareness;judgment;awareness of need for assistance;impulsivity  -EG     Impairments Affecting Function (Mobility) balance;cognition;shortness of breath;endurance/activity tolerance;strength  -EG               User Key  (r) = Recorded By, (t) = Taken By, (c) = Cosigned By      Initials Name Provider Type    EG Radha Johnson, OT Occupational Therapist                     Mobility/ADL's       Row Name 12/29/23 0809          Bed Mobility    Bed Mobility bed mobility (all) activities  -EG     All Activities, [place] (Bed Mobility) standby assist  -EG     Assistive Device (Bed Mobility) bed rails  -EG       Row Name 12/29/23 0809          Transfers    Comment, (Transfers) CGA for all transfers with use of RW  -EG       Row Name 12/29/23 0809          Functional Mobility    Functional Mobility- Ind. Level contact guard assist  -EG     Functional Mobility- Device walker, front-wheeled  -EG     Functional Mobility- Comment CGA to perform mobility in room to doorway and back to recliner with use of RW, occ. cues and education on safe use of AD but no LOB  -EG       Row Name  12/29/23 0809          Activities of Daily Living    BADL Assessment/Intervention bathing;upper body dressing;lower body dressing;grooming;feeding;toileting  -       Row Name 12/29/23 0809          Bathing Assessment/Intervention    [place] Level (Bathing) bathing skills;upper body;set up;lower body;minimum assist (75% patient effort)  -EG       Row Name 12/29/23 0809          Upper Body Dressing Assessment/Training    [place] Level (Upper Body Dressing) upper body dressing skills;set up  -EG       Row Name 12/29/23 0809          Lower Body Dressing Assessment/Training    [place] Level (Lower Body Dressing) lower body dressing skills;minimum assist (75% patient effort)  -EG       Row Name 12/29/23 0809          Grooming Assessment/Training    [place] Level (Grooming) grooming skills;set up  -EG       [facility] Name 12/29/23 0809          Self-Feeding Assessment/Training    [place] Level (Feeding) feeding skills;set up  -EG       Row Name 12/29/23 0809          Toileting Assessment/Training    [place] Level (Toileting) toileting skills;contact guard assist  -EG               User Key  (r) = Recorded By, (t) = Taken By, (c) = Cosigned By      Initials Name Provider Type    EG Radha Johnson OT Occupational Therapist                   Obj/Interventions       [facility] Name 12/29/23 0810          Sensory Assessment (Somatosensory)    Sensory Assessment (Somatosensory) sensation intact  -EG       Row Name 12/29/23 0810          Vision Assessment/Intervention    Visual Impairment/Limitations corrective lenses full-time  -[facility] Name 12/29/23 0810          Range of Motion Comprehensive    General Range of Motion bilateral upper extremity ROM WNL  -[facility] Name 12/29/23 0810          Strength Comprehensive (MMT)    Comment, General Manual Muscle Testing (MMT) Assessment 4-/5 grossly BUE's  -EG       Row Name 12/29/23 0810          Motor Skills    Motor Skills coordination;functional  endurance  -EG     Coordination WFL  -EG     Functional Endurance fair on 2L via NC  -EG       Row Name 12/29/23 0810          Balance    Balance Assessment sit to stand dynamic balance;standing static balance  -EG     Sit to Stand Dynamic Balance contact guard  -EG     Static Standing Balance contact guard  -EG               User Key  (r) = Recorded By, (t) = Taken By, (c) = Cosigned By      Initials Name Provider Type    EG Radha Johnson, OT Occupational Therapist                   Goals/Plan       Row Name 12/29/23 0818          Transfer Goal 1 (OT)    Activity/Assistive Device (Transfer Goal 1, OT) transfers, all  -EG     [place] Level/Cues Needed (Transfer Goal 1, OT) modified independence  -EG     Time Frame (Transfer Goal 1, OT) long term goal (LTG);10 days  -EG       Row Name 12/29/23 0818          Bathing Goal 1 (OT)    Activity/Device (Bathing Goal 1, OT) bathing skills, all  -EG     [place] Level/Cues Needed (Bathing Goal 1, OT) modified independence  -EG     Time Frame (Bathing Goal 1, OT) long term goal (LTG);10 days  -EG       Row Name 12/29/23 0818          Dressing Goal 1 (OT)    Activity/Device (Dressing Goal 1, OT) dressing skills, all  -EG     [place]/Cues Needed (Dressing Goal 1, OT) modified independence  -EG     Time Frame (Dressing Goal 1, OT) long term goal (LTG);10 days  -EG       Row Name 12/29/23 0818          Toileting Goal 1 (OT)    Activity/Device (Toileting Goal 1, OT) toileting skills, all  -EG     [place] Level/Cues Needed (Toileting Goal 1, OT) modified independence  -EG     Time Frame (Toileting Goal 1, OT) long term goal (LTG);10 days  -EG       Row Name 12/29/23 0818          Strength Goal 1 (OT)    Strength Goal 1 (OT) Patient will improve BUE strength grossly to 4/5 to facilitate increased ADL/Transfer ind. skills.  -EG     Time Frame (Strength Goal 1, OT) long term goal (LTG);10 days  -EG       Row Name 12/29/23 0818          Problem Specific Goal 1 (OT)     Problem Specific Goal 1 (OT) Patient will demonstrate functional endurance skills at fair+ to promote improved ind. with functional tasks.  -EG     Time Frame (Problem Specific Goal 1, OT) long term goal (LTG);10 days  -EG       Row Name 12/29/23 0818          Therapy Assessment/Plan (OT)    Planned Therapy Interventions (OT) activity tolerance training;functional balance retraining;occupation/activity based interventions;adaptive equipment training;BADL retraining;neuromuscular control/coordination retraining;patient/caregiver education/training;transfer/mobility retraining;strengthening exercise  -EG               User Key  (r) = Recorded By, (t) = Taken By, (c) = Cosigned By      Initials Name Provider Type    EG Radha Johnson, OT Occupational Therapist                   Clinical Impression       Row Name 12/29/23 0815          Pain Assessment    Pretreatment Pain Rating 0/10 - no pain  -EG     Posttreatment Pain Rating 0/10 - no pain  -EG       Row Name 12/29/23 0815          Plan of Care Review    Plan of Care Reviewed With patient  -EG     Progress no change  -EG     Outcome Evaluation Patient presents with limitations of decreased functional endurance, strength, balance and limited safety/insight into own deficits requiring need for skilled OT services to facilitate return to prior level of function with ADLs.  -EG       Row Name 12/29/23 0815          Therapy Assessment/Plan (OT)    Rehab Potential (OT) good, to achieve stated therapy goals  -EG     Criteria for Skilled Therapeutic Interventions Met (OT) yes;meets criteria;skilled treatment is necessary  -EG     Therapy Frequency (OT) 5 times/wk  -EG       Row Name 12/29/23 0815          Therapy Plan Review/Discharge Plan (OT)    Equipment Needs Upon Discharge (OT) --  rolling walker; 3-in-1 commode; tub shower bench; Possibly will need O2 equipment for discharge home  -EG     Anticipated Discharge Disposition (OT) skilled nursing facility  -EG        Row Name 12/29/23 0815          Positioning and Restraints    Pre-Treatment Position in bed  -EG     Post Treatment Position chair  -EG     In Chair exit alarm on;call light within reach;reclined  -EG               User Key  (r) = Recorded By, (t) = Taken By, (c) = Cosigned By      Initials Name Provider Type    Radha Colbert OT Occupational Therapist                   Outcome Measures       Row Name 12/29/23 0820          How much help from another is currently needed...    Putting on and taking off regular lower body clothing? 3  -EG     Bathing (including washing, rinsing, and drying) 3  -EG     Toileting (which includes using toilet bed pan or urinal) 3  -EG     Putting on and taking off regular upper body clothing 4  -EG     Taking care of personal grooming (such as brushing teeth) 4  -EG     Eating meals 4  -EG     AM-PAC 6 Clicks Score (OT) 21  -EG       Row Name 12/28/23 2100          How much help from another person do you currently need...    Turning from your back to your side while in flat bed without using bedrails? 3  -SS     Moving from lying on back to sitting on the side of a flat bed without bedrails? 3  -SS     Moving to and from a bed to a chair (including a wheelchair)? 3  -SS     Standing up from a chair using your arms (e.g., wheelchair, bedside chair)? 3  -SS     Climbing 3-5 steps with a railing? 2  -SS     To walk in hospital room? 3  -SS     AM-PAC 6 Clicks Score (PT) 17  -SS     Highest Level of Mobility Goal 5 --> Static standing  -SS       Row Name 12/29/23 0820          Functional Assessment    Outcome Measure Options AM-PAC 6 Clicks Daily Activity (OT);Optimal Instrument  -EG       Row Name 12/29/23 0820          Optimal Instrument    Optimal Instrument Optimal - 3  -EG     Bending/Stooping 2  -EG     Standing 2  -EG     Reaching 1  -EG     From the list, choose the 3 activities you would most like to be able to do without any difficulty Bending/stooping;Reaching;Standing  -EG      Total Score Optimal - 3 5  -EG               User Key  (r) = Recorded By, (t) = Taken By, (c) = Cosigned By      Initials Name Provider Type    Edilia Lopez, RN Registered Nurse    Radha Colbert OT Occupational Therapist                    Occupational Therapy Education       Title: PT OT SLP Therapies (Done)       Topic: Occupational Therapy (Done)       Point: ADL training (Done)       Description:   Instruct learner(s) on proper safety adaptation and remediation techniques during self care or transfers.   Instruct in proper use of assistive devices.                  Learning Progress Summary             Patient ALEX Johns VU by EG at 12/29/2023 0821    Comment: Education on safe transfer techniques  Education on benefits of OT services  Education on fall risk prevention  Education on OT services                         Point: Home exercise program (Done)       Description:   Instruct learner(s) on appropriate technique for monitoring, assisting and/or progressing therapeutic exercises/activities.                  Learning Progress Summary             Patient ALEX Johns VU by EG at 12/29/2023 0821    Comment: Education on safe transfer techniques  Education on benefits of OT services  Education on fall risk prevention  Education on OT services                         Point: Precautions (Done)       Description:   Instruct learner(s) on prescribed precautions during self-care and functional transfers.                  Learning Progress Summary             Patient ALEX Johns VU by EG at 12/29/2023 0821    Comment: Education on safe transfer techniques  Education on benefits of OT services  Education on fall risk prevention  Education on OT services                         Point: Body mechanics (Done)       Description:   Instruct learner(s) on proper positioning and spine alignment during self-care, functional mobility activities and/or exercises.                  Learning Progress Summary              Patient ALEX Johns, VU by EG at 12/29/2023 0821    Comment: Education on safe transfer techniques  Education on benefits of OT services  Education on fall risk prevention  Education on OT services                                         User Key       Initials Effective Dates Name Provider Type Discipline    EG 09/14/22 -  Radha Johnson OT Occupational Therapist OT                  OT Recommendation and Plan  Planned Therapy Interventions (OT): activity tolerance training, functional balance retraining, occupation/activity based interventions, adaptive equipment training, BADL retraining, neuromuscular control/coordination retraining, patient/caregiver education/training, transfer/mobility retraining, strengthening exercise  Therapy Frequency (OT): 5 times/wk  Plan of Care Review  Plan of Care Reviewed With: patient  Progress: no change  Outcome Evaluation: Patient presents with limitations of decreased functional endurance, strength, balance and limited safety/insight into own deficits requiring need for skilled OT services to facilitate return to prior level of function with ADLs.     Time Calculation:   Evaluation Complexity (OT)  Review Occupational Profile/Medical/Therapy History Complexity: expanded/moderate complexity  Assessment, Occupational Performance/Identification of Deficit Complexity: 3-5 performance deficits  Clinical Decision Making Complexity (OT): detailed assessment/moderate complexity  Overall Complexity of Evaluation (OT): moderate complexity     Time Calculation- OT       Row Name 12/29/23 0823             Time Calculation- OT    OT Received On 12/29/23  -EG      OT Goal Re-Cert Due Date 01/07/24  -EG         Untimed Charges    OT Eval/Re-eval Minutes 33  -EG         Total Minutes    Untimed Charges Total Minutes 33  -EG       Total Minutes 33  -EG                User Key  (r) = Recorded By, (t) = Taken By, (c) = Cosigned By      Initials Name Provider Type    EG Radha Johnson OT  Occupational Therapist                  Therapy Charges for Today       Code Description Service Date Service Provider Modifiers Qty    [id number] HC OT EVAL MOD COMPLEXITY 3 12/29/2023 Radha Johnson, MARILEE GO 1                 Radha Johnson OT  12/29/2023

## 2023-12-29 NOTE — PLAN OF CARE
Problem: Adult Inpatient Plan of Care  Goal: Plan of Care Review  Outcome: Ongoing, Progressing  Flowsheets (Taken 12/29/2023 1522)  Progress: no change  Plan of Care Reviewed With: patient  Outcome Evaluation: Patient disoriented to time and situation. Patient on 4L NC. Patient has no complaints at this time.  Goal: Patient-Specific Goal (Individualized)  Outcome: Ongoing, Progressing  Goal: Absence of Hospital-Acquired Illness or Injury  Outcome: Ongoing, Progressing  Intervention: Identify and Manage Fall Risk  Recent Flowsheet Documentation  Taken 12/29/2023 1522 by Kiara Davidson RN  Safety Promotion/Fall Prevention:   nonskid shoes/slippers when out of bed   safety round/check completed  Taken 12/29/2023 1351 by Kiara Davidson RN  Safety Promotion/Fall Prevention:   nonskid shoes/slippers when out of bed   safety round/check completed  Taken 12/29/2023 1230 by Kiara Davidson RN  Safety Promotion/Fall Prevention:   nonskid shoes/slippers when out of bed   safety round/check completed  Taken 12/29/2023 1042 by Kiara Davidson RN  Safety Promotion/Fall Prevention:   nonskid shoes/slippers when out of bed   safety round/check completed  Taken 12/29/2023 0855 by Kiara Davidson RN  Safety Promotion/Fall Prevention:   assistive device/personal items within reach   clutter free environment maintained   fall prevention program maintained   lighting adjusted   nonskid shoes/slippers when out of bed   room organization consistent   safety round/check completed  Taken 12/29/2023 0715 by Kiara Davidson RN  Safety Promotion/Fall Prevention:   nonskid shoes/slippers when out of bed   safety round/check completed  Intervention: Prevent and Manage VTE (Venous Thromboembolism) Risk  Recent Flowsheet Documentation  Taken 12/29/2023 0855 by Kiara Davidson RN  Range of Motion: active ROM (range of motion) encouraged  Goal: Optimal Comfort and Wellbeing  Outcome: Ongoing, Progressing  Intervention: Provide  Person-Centered Care  Recent Flowsheet Documentation  Taken 12/29/2023 0855 by Kiara Davidson RN  Trust Relationship/Rapport:   care explained   choices provided   emotional support provided   empathic listening provided   questions answered   questions encouraged   reassurance provided   thoughts/feelings acknowledged  Goal: Readiness for Transition of Care  Outcome: Ongoing, Progressing     Problem: Asthma Comorbidity  Goal: Maintenance of Asthma Control  Outcome: Ongoing, Progressing  Intervention: Maintain Asthma Symptom Control  Recent Flowsheet Documentation  Taken 12/29/2023 0855 by Kiara Davidson RN  Medication Review/Management:   medications reviewed   high-risk medications identified     Problem: Behavioral Health Comorbidity  Goal: Maintenance of Behavioral Health Symptom Control  Outcome: Ongoing, Progressing  Intervention: Maintain Behavioral Health Symptom Control  Recent Flowsheet Documentation  Taken 12/29/2023 0855 by Kiara Davidson RN  Medication Review/Management:   medications reviewed   high-risk medications identified     Problem: COPD (Chronic Obstructive Pulmonary Disease) Comorbidity  Goal: Maintenance of COPD Symptom Control  Outcome: Ongoing, Progressing  Intervention: Maintain COPD-Symptom Control  Recent Flowsheet Documentation  Taken 12/29/2023 0855 by Kiara Davidson RN  Medication Review/Management:   medications reviewed   high-risk medications identified     Problem: Skin Injury Risk Increased  Goal: Skin Health and Integrity  Outcome: Ongoing, Progressing  Intervention: Optimize Skin Protection  Recent Flowsheet Documentation  Taken 12/29/2023 1522 by Kiara Davidson RN  Head of Bed (HOB) Positioning: HOB elevated  Taken 12/29/2023 1351 by Kiara Davidson RN  Head of Bed (HOB) Positioning: HOB elevated  Taken 12/29/2023 1230 by Kiara Davidson RN  Head of Bed (HOB) Positioning: HOB elevated  Taken 12/29/2023 1042 by Kiara Davidson RN  Head of Bed ([facility]) Positioning: HOB  elevated  Taken 12/29/2023 0715 by Kiara Davidson RN  Head of Bed (HOB) Positioning: HOB elevated     Problem: Fall Injury Risk  Goal: Absence of Fall and Fall-Related Injury  Outcome: Ongoing, Progressing  Intervention: Identify and Manage Contributors  Recent Flowsheet Documentation  Taken 12/29/2023 0855 by Kiara Davidson, RN  Medication Review/Management:   medications reviewed   high-risk medications identified  Intervention: Promote Injury-Free Environment  Recent Flowsheet Documentation  Taken 12/29/2023 1522 by Kiara Davidson, RN  Safety Promotion/Fall Prevention:   nonskid shoes/slippers when out of bed   safety round/check completed  Taken 12/29/2023 1351 by Kiara Davidson RN  Safety Promotion/Fall Prevention:   nonskid shoes/slippers when out of bed   safety round/check completed  Taken 12/29/2023 1230 by Kiara Davidson RN  Safety Promotion/Fall Prevention:   nonskid shoes/slippers when out of bed   safety round/check completed  Taken 12/29/2023 1042 by Kiara Davidson RN  Safety Promotion/Fall Prevention:   nonskid shoes/slippers when out of bed   safety round/check completed  Taken 12/29/2023 0855 by Kiara Davidson RN  Safety Promotion/Fall Prevention:   assistive device/personal items within reach   clutter free environment maintained   fall prevention program maintained   lighting adjusted   nonskid shoes/slippers when out of bed   room organization consistent   safety round/check completed  Taken 12/29/2023 0715 by Kiara Davidson, RN  Safety Promotion/Fall Prevention:   nonskid shoes/slippers when out of bed   safety round/check completed   Goal Outcome Evaluation:  Plan of Care Reviewed With: patient        Progress: no change  Outcome Evaluation: Patient disoriented to time and situation. Patient on 4L NC. Patient has no complaints at this time.

## 2023-12-30 LAB
ALBUMIN SERPL-MCNC: 3.2 G/DL (ref 3.5–5.2)
ALBUMIN/GLOB SERPL: 0.8 G/DL
ALP SERPL-CCNC: 82 U/L (ref 39–117)
ALT SERPL W P-5'-P-CCNC: 126 U/L (ref 1–33)
ANION GAP SERPL CALCULATED.3IONS-SCNC: 10.8 MMOL/L (ref 5–15)
AST SERPL-CCNC: 34 U/L (ref 1–32)
BASOPHILS # BLD AUTO: 0.03 10*3/MM3 (ref 0–0.2)
BASOPHILS NFR BLD AUTO: 0.2 % (ref 0–1.5)
BILIRUB SERPL-MCNC: 0.3 MG/DL (ref 0–1.2)
BUN SERPL-MCNC: 19 MG/DL (ref 8–23)
BUN/CREAT SERPL: 22.1 (ref 7–25)
CALCIUM SPEC-SCNC: 9.7 MG/DL (ref 8.6–10.5)
CHLORIDE SERPL-SCNC: 99 MMOL/L (ref 98–107)
CO2 SERPL-SCNC: 25.2 MMOL/L (ref 22–29)
CREAT SERPL-MCNC: 0.86 MG/DL (ref 0.57–1)
DEPRECATED RDW RBC AUTO: 50.6 FL (ref 37–54)
EGFRCR SERPLBLD CKD-EPI 2021: 74.2 ML/MIN/1.73
EOSINOPHIL # BLD AUTO: 0 10*3/MM3 (ref 0–0.4)
EOSINOPHIL NFR BLD AUTO: 0 % (ref 0.3–6.2)
ERYTHROCYTE [DISTWIDTH] IN BLOOD BY AUTOMATED COUNT: 14.6 % (ref 12.3–15.4)
GLOBULIN UR ELPH-MCNC: 3.8 GM/DL
GLUCOSE SERPL-MCNC: 185 MG/DL (ref 65–99)
HCT VFR BLD AUTO: 47.4 % (ref 34–46.6)
HGB BLD-MCNC: 15.7 G/DL (ref 12–15.9)
IMM GRANULOCYTES # BLD AUTO: 0.18 10*3/MM3 (ref 0–0.05)
IMM GRANULOCYTES NFR BLD AUTO: 1.2 % (ref 0–0.5)
LYMPHOCYTES # BLD AUTO: 1.53 10*3/MM3 (ref 0.7–3.1)
LYMPHOCYTES NFR BLD AUTO: 9.9 % (ref 19.6–45.3)
MCH RBC QN AUTO: 31.1 PG (ref 26.6–33)
MCHC RBC AUTO-ENTMCNC: 33.1 G/DL (ref 31.5–35.7)
MCV RBC AUTO: 93.9 FL (ref 79–97)
MONOCYTES # BLD AUTO: 0.9 10*3/MM3 (ref 0.1–0.9)
MONOCYTES NFR BLD AUTO: 5.8 % (ref 5–12)
NEUTROPHILS NFR BLD AUTO: 12.84 10*3/MM3 (ref 1.7–7)
NEUTROPHILS NFR BLD AUTO: 82.9 % (ref 42.7–76)
NRBC BLD AUTO-RTO: 0 /100 WBC (ref 0–0.2)
PLATELET # BLD AUTO: 325 10*3/MM3 (ref 140–450)
PMV BLD AUTO: 10.2 FL (ref 6–12)
POTASSIUM SERPL-SCNC: 3.9 MMOL/L (ref 3.5–5.2)
PROT SERPL-MCNC: 7 G/DL (ref 6–8.5)
RBC # BLD AUTO: 5.05 10*6/MM3 (ref 3.77–5.28)
SODIUM SERPL-SCNC: 135 MMOL/L (ref 136–145)
WBC NRBC COR # BLD AUTO: 15.48 10*3/MM3 (ref 3.4–10.8)

## 2023-12-30 PROCEDURE — 85025 COMPLETE CBC W/AUTO DIFF WBC: CPT | Performed by: FAMILY MEDICINE

## 2023-12-30 PROCEDURE — 94799 UNLISTED PULMONARY SVC/PX: CPT

## 2023-12-30 PROCEDURE — 25010000002 ENOXAPARIN PER 10 MG: Performed by: INTERNAL MEDICINE

## 2023-12-30 PROCEDURE — 94664 DEMO&/EVAL PT USE INHALER: CPT

## 2023-12-30 PROCEDURE — 63710000001 PREDNISONE PER 1 MG: Performed by: FAMILY MEDICINE

## 2023-12-30 PROCEDURE — 99232 SBSQ HOSP IP/OBS MODERATE 35: CPT | Performed by: FAMILY MEDICINE

## 2023-12-30 PROCEDURE — 80053 COMPREHEN METABOLIC PANEL: CPT | Performed by: INTERNAL MEDICINE

## 2023-12-30 RX ORDER — PREDNISONE 20 MG/1
40 TABLET ORAL
Status: DISCONTINUED | OUTPATIENT
Start: 2023-12-30 | End: 2024-01-01 | Stop reason: HOSPADM

## 2023-12-30 RX ORDER — FLUOXETINE HYDROCHLORIDE 20 MG/1
40 CAPSULE ORAL DAILY
Status: DISCONTINUED | OUTPATIENT
Start: 2023-12-30 | End: 2024-01-01 | Stop reason: HOSPADM

## 2023-12-30 RX ORDER — LORAZEPAM 0.5 MG/1
0.5 TABLET ORAL ONCE
Status: COMPLETED | OUTPATIENT
Start: 2023-12-30 | End: 2023-12-30

## 2023-12-30 RX ADMIN — ARFORMOTEROL TARTRATE 15 MCG: 15 SOLUTION RESPIRATORY (INHALATION) at 18:21

## 2023-12-30 RX ADMIN — ARFORMOTEROL TARTRATE 15 MCG: 15 SOLUTION RESPIRATORY (INHALATION) at 07:01

## 2023-12-30 RX ADMIN — ENOXAPARIN SODIUM 40 MG: 100 INJECTION SUBCUTANEOUS at 17:02

## 2023-12-30 RX ADMIN — LORAZEPAM 0.5 MG: 0.5 TABLET ORAL at 09:14

## 2023-12-30 RX ADMIN — DOCUSATE SODIUM 50 MG AND SENNOSIDES 8.6 MG 2 TABLET: 8.6; 5 TABLET, FILM COATED ORAL at 21:32

## 2023-12-30 RX ADMIN — LEVOTHYROXINE SODIUM 25 MCG: 0.03 TABLET ORAL at 05:02

## 2023-12-30 RX ADMIN — BUDESONIDE 0.5 MG: 0.5 SUSPENSION RESPIRATORY (INHALATION) at 18:21

## 2023-12-30 RX ADMIN — FLUOXETINE HYDROCHLORIDE 40 MG: 20 CAPSULE ORAL at 09:21

## 2023-12-30 RX ADMIN — PREDNISONE 40 MG: 20 TABLET ORAL at 13:30

## 2023-12-30 RX ADMIN — FAMOTIDINE 20 MG: 20 TABLET, FILM COATED ORAL at 21:32

## 2023-12-30 RX ADMIN — Medication 10 ML: at 21:32

## 2023-12-30 RX ADMIN — DOCUSATE SODIUM 50 MG AND SENNOSIDES 8.6 MG 2 TABLET: 8.6; 5 TABLET, FILM COATED ORAL at 09:21

## 2023-12-30 RX ADMIN — AZITHROMYCIN DIHYDRATE 500 MG: 250 TABLET ORAL at 09:21

## 2023-12-30 RX ADMIN — Medication 10 ML: at 09:21

## 2023-12-30 RX ADMIN — BUDESONIDE 0.5 MG: 0.5 SUSPENSION RESPIRATORY (INHALATION) at 07:01

## 2023-12-30 NOTE — PLAN OF CARE
Goal Outcome Evaluation:           Progress: improving  Outcome Evaluation: VSS throughout shift. Pt anxious this am, resolved with x1 dose of ativan. On 3L NC. Audible wheezes throughout. Pt up in chair & in room today. Cont to BSC w/ walker. Transitioned to PO prednisone. Family at bedside. Will cont POC

## 2023-12-30 NOTE — PROGRESS NOTES
[facility]   Hospitalist Progress Note  Date: 2023  Patient Name: Selene Forrest  : 1955  MRN: [id number]  Date of admission: 2023      Subjective   Subjective     Summary: 68-year-old female with a past medical history of depression, hypothyroidism, COPD, and abdominal hernia presenting with shortness of breath. Reports that she has been around her neighbor who has had flulike symptoms. States that she just has not felt very well and has been more short of breath than usual. She was brought in by the nephew because the neighbors stated that she has been somewhat confused.  She is not on oxygen at home and has no other complaints this time. At this time, she is AAO X2 complaining of shortness of breath and wheezing, but denies any CP or N/V.     In ED, blood pressure 149/84, heart rate 88, respiratory rate of 22, temperature 97.7 O2 saturation 87% on room air.  Labs on arrival showed a sodium 135, potassium 3.6, chloride 94, BUN 28, creatinine 0.9, WBC 12.2, hemoglobin 15.4, platelet count 272.  Chest x-ray showed no active disease but did show emphysema.    Interval Followup:   Feeling a lot better today but still hypoxic.  Requiring 4 L supplemental oxygen per nasal cannula.  Still having a lot of wheezing.  Confusion improving.  Alert and oriented.      Objective   Objective     Vitals:   Temp:  [97.5 °F (36.4 °C)-98.5 °F (36.9 °C)] 98 °F (36.7 °C)  Heart Rate:  [61-79] 75  Resp:  [18-20] 18  BP: (133-156)/(61-82) 133/61  Flow (L/min):  [3-4] 3  Physical Exam    Constitutional: Awake, alert, no acute distress   Respiratory: Wheezes to auscultation bilaterally, nonlabored respirations    Cardiovascular: RRR, no murmurs, rubs, or gallops, palpable pedal pulses bilaterally   Gastrointestinal: Positive bowel sounds, soft, nontender, nondistended   Psychiatric: Appropriate affect, cooperative   Neurologic: Oriented x 3, speech clear          Assessment & Plan   Assessment / Plan      Assessment/Plan:  COPD exacerbation  Respiratory failure with hypoxia  Altered mental status. Resolved  Depression  Hypothyroidism  Abdominal hernia     Plan:  Remain inpatient  DuoNebs every 4 hours  Titrate oxygen to SaO2 of 88% 92%  Solu-Medrol 40 q8h.  Transition to PO prednisone  Anxious.  Add back home SSRI and give a 1 time dose of ativan  Continue Abx  Monitor SaO2  TSH, B12, ammonia, and folate WNL     Discussed plan with RN.    DVT prophylaxis:  Medical DVT prophylaxis orders are present.    CODE STATUS:   Level Of Support Discussed With: Patient  Code Status (Patient has no pulse and is not breathing): CPR (Attempt to Resuscitate)  Medical Interventions (Patient has pulse or is breathing): Full Support

## 2023-12-30 NOTE — PLAN OF CARE
Problem: Adult Inpatient Plan of Care  Goal: Plan of Care Review  Outcome: Ongoing, Progressing  Flowsheets (Taken 12/30/2023 0511)  Outcome Evaluation: VSS. No C/O pain or discomfort this shift. Patient remain on 4L nasal cannula. No acute events to report this shift. Continue plan of care.  Goal: Patient-Specific Goal (Individualized)  Outcome: Ongoing, Progressing  Goal: Absence of Hospital-Acquired Illness or Injury  Outcome: Ongoing, Progressing  Intervention: Identify and Manage Fall Risk  Recent Flowsheet Documentation  Taken 12/29/2023 2055 by China Johnson RN  Safety Promotion/Fall Prevention: safety round/check completed  Intervention: Prevent and Manage VTE (Venous Thromboembolism) Risk  Recent Flowsheet Documentation  Taken 12/29/2023 2055 by China Johnson RN  VTE Prevention/Management: (lovenox) other (see comments)  Range of Motion: active ROM (range of motion) encouraged  Intervention: Prevent Infection  Recent Flowsheet Documentation  Taken 12/29/2023 2055 by China Johnson RN  Infection Prevention:   single patient room provided   rest/sleep promoted  Goal: Optimal Comfort and Wellbeing  Outcome: Ongoing, Progressing  Intervention: Provide Person-Centered Care  Recent Flowsheet Documentation  Taken 12/29/2023 2055 by China Johnson RN  Trust Relationship/Rapport:   care explained   choices provided   emotional support provided   empathic listening provided   questions answered   questions encouraged   reassurance provided   thoughts/feelings acknowledged  Goal: Readiness for Transition of Care  Outcome: Ongoing, Progressing     Problem: Asthma Comorbidity  Goal: Maintenance of Asthma Control  Outcome: Ongoing, Progressing  Intervention: Maintain Asthma Symptom Control  Recent Flowsheet Documentation  Taken 12/29/2023 2055 by China Johnson RN  Medication Review/Management: medications reviewed     Problem: Behavioral Health Comorbidity  Goal: Maintenance of  Behavioral Health Symptom Control  Outcome: Ongoing, Progressing  Intervention: Maintain Behavioral Health Symptom Control  Recent Flowsheet Documentation  Taken 12/29/2023 2055 by China Johnson RN  Medication Review/Management: medications reviewed     Problem: COPD (Chronic Obstructive Pulmonary Disease) Comorbidity  Goal: Maintenance of COPD Symptom Control  Outcome: Ongoing, Progressing  Intervention: Maintain COPD-Symptom Control  Recent Flowsheet Documentation  Taken 12/29/2023 2055 by China Johnson RN  Supportive Measures: active listening utilized  Medication Review/Management: medications reviewed     Problem: Skin Injury Risk Increased  Goal: Skin Health and Integrity  Outcome: Ongoing, Progressing     Problem: Fall Injury Risk  Goal: Absence of Fall and Fall-Related Injury  Outcome: Ongoing, Progressing  Intervention: Identify and Manage Contributors  Recent Flowsheet Documentation  Taken 12/29/2023 2055 by China Johnson RN  Medication Review/Management: medications reviewed  Intervention: Promote Injury-Free Environment  Recent Flowsheet Documentation  Taken 12/29/2023 2055 by China Johnson RN  Safety Promotion/Fall Prevention: safety round/check completed   Goal Outcome Evaluation:              Outcome Evaluation: VSS. No C/O pain or discomfort this shift. Patient remain on 4L nasal cannula. No acute events to report this shift. Continue plan of care.

## 2023-12-31 LAB
ALBUMIN SERPL-MCNC: 3 G/DL (ref 3.5–5.2)
ALBUMIN/GLOB SERPL: 0.9 G/DL
ALP SERPL-CCNC: 69 U/L (ref 39–117)
ALT SERPL W P-5'-P-CCNC: 88 U/L (ref 1–33)
ANION GAP SERPL CALCULATED.3IONS-SCNC: 9.2 MMOL/L (ref 5–15)
AST SERPL-CCNC: 20 U/L (ref 1–32)
BASOPHILS # BLD AUTO: 0.01 10*3/MM3 (ref 0–0.2)
BASOPHILS NFR BLD AUTO: 0.1 % (ref 0–1.5)
BILIRUB SERPL-MCNC: 0.3 MG/DL (ref 0–1.2)
BUN SERPL-MCNC: 17 MG/DL (ref 8–23)
BUN/CREAT SERPL: 24.3 (ref 7–25)
CALCIUM SPEC-SCNC: 9.1 MG/DL (ref 8.6–10.5)
CHLORIDE SERPL-SCNC: 99 MMOL/L (ref 98–107)
CO2 SERPL-SCNC: 27.8 MMOL/L (ref 22–29)
CREAT SERPL-MCNC: 0.7 MG/DL (ref 0.57–1)
DEPRECATED RDW RBC AUTO: 49.9 FL (ref 37–54)
EGFRCR SERPLBLD CKD-EPI 2021: 94.9 ML/MIN/1.73
EOSINOPHIL # BLD AUTO: 0.01 10*3/MM3 (ref 0–0.4)
EOSINOPHIL NFR BLD AUTO: 0.1 % (ref 0.3–6.2)
ERYTHROCYTE [DISTWIDTH] IN BLOOD BY AUTOMATED COUNT: 14.5 % (ref 12.3–15.4)
GLOBULIN UR ELPH-MCNC: 3.2 GM/DL
GLUCOSE SERPL-MCNC: 118 MG/DL (ref 65–99)
HCT VFR BLD AUTO: 44 % (ref 34–46.6)
HGB BLD-MCNC: 14.6 G/DL (ref 12–15.9)
IMM GRANULOCYTES # BLD AUTO: 0.13 10*3/MM3 (ref 0–0.05)
IMM GRANULOCYTES NFR BLD AUTO: 0.9 % (ref 0–0.5)
LYMPHOCYTES # BLD AUTO: 2.13 10*3/MM3 (ref 0.7–3.1)
LYMPHOCYTES NFR BLD AUTO: 15.5 % (ref 19.6–45.3)
MCH RBC QN AUTO: 31.1 PG (ref 26.6–33)
MCHC RBC AUTO-ENTMCNC: 33.2 G/DL (ref 31.5–35.7)
MCV RBC AUTO: 93.8 FL (ref 79–97)
MONOCYTES # BLD AUTO: 1.32 10*3/MM3 (ref 0.1–0.9)
MONOCYTES NFR BLD AUTO: 9.6 % (ref 5–12)
NEUTROPHILS NFR BLD AUTO: 10.1 10*3/MM3 (ref 1.7–7)
NEUTROPHILS NFR BLD AUTO: 73.8 % (ref 42.7–76)
NRBC BLD AUTO-RTO: 0 /100 WBC (ref 0–0.2)
PLATELET # BLD AUTO: 336 10*3/MM3 (ref 140–450)
PMV BLD AUTO: 10.3 FL (ref 6–12)
POTASSIUM SERPL-SCNC: 4.1 MMOL/L (ref 3.5–5.2)
PROT SERPL-MCNC: 6.2 G/DL (ref 6–8.5)
RBC # BLD AUTO: 4.69 10*6/MM3 (ref 3.77–5.28)
SODIUM SERPL-SCNC: 136 MMOL/L (ref 136–145)
WBC NRBC COR # BLD AUTO: 13.7 10*3/MM3 (ref 3.4–10.8)

## 2023-12-31 PROCEDURE — 85025 COMPLETE CBC W/AUTO DIFF WBC: CPT | Performed by: FAMILY MEDICINE

## 2023-12-31 PROCEDURE — 25010000002 ENOXAPARIN PER 10 MG: Performed by: INTERNAL MEDICINE

## 2023-12-31 PROCEDURE — 94799 UNLISTED PULMONARY SVC/PX: CPT

## 2023-12-31 PROCEDURE — 94664 DEMO&/EVAL PT USE INHALER: CPT

## 2023-12-31 PROCEDURE — 80053 COMPREHEN METABOLIC PANEL: CPT | Performed by: INTERNAL MEDICINE

## 2023-12-31 PROCEDURE — 63710000001 PREDNISONE PER 1 MG: Performed by: FAMILY MEDICINE

## 2023-12-31 PROCEDURE — 99232 SBSQ HOSP IP/OBS MODERATE 35: CPT | Performed by: INTERNAL MEDICINE

## 2023-12-31 RX ORDER — HYDROXYZINE HYDROCHLORIDE 25 MG/1
25 TABLET, FILM COATED ORAL 3 TIMES DAILY PRN
Status: DISCONTINUED | OUTPATIENT
Start: 2023-12-31 | End: 2024-01-01 | Stop reason: HOSPADM

## 2023-12-31 RX ADMIN — FLUOXETINE HYDROCHLORIDE 40 MG: 20 CAPSULE ORAL at 09:04

## 2023-12-31 RX ADMIN — ARFORMOTEROL TARTRATE 15 MCG: 15 SOLUTION RESPIRATORY (INHALATION) at 19:18

## 2023-12-31 RX ADMIN — LEVOTHYROXINE SODIUM 25 MCG: 0.03 TABLET ORAL at 05:14

## 2023-12-31 RX ADMIN — IPRATROPIUM BROMIDE AND ALBUTEROL SULFATE 3 ML: 2.5; .5 SOLUTION RESPIRATORY (INHALATION) at 19:22

## 2023-12-31 RX ADMIN — Medication 10 ML: at 20:45

## 2023-12-31 RX ADMIN — DOCUSATE SODIUM 50 MG AND SENNOSIDES 8.6 MG 2 TABLET: 8.6; 5 TABLET, FILM COATED ORAL at 20:45

## 2023-12-31 RX ADMIN — DOCUSATE SODIUM 50 MG AND SENNOSIDES 8.6 MG 2 TABLET: 8.6; 5 TABLET, FILM COATED ORAL at 07:59

## 2023-12-31 RX ADMIN — ARFORMOTEROL TARTRATE 15 MCG: 15 SOLUTION RESPIRATORY (INHALATION) at 07:04

## 2023-12-31 RX ADMIN — BUDESONIDE 0.5 MG: 0.5 SUSPENSION RESPIRATORY (INHALATION) at 19:18

## 2023-12-31 RX ADMIN — Medication 10 ML: at 09:00

## 2023-12-31 RX ADMIN — PREDNISONE 40 MG: 20 TABLET ORAL at 07:58

## 2023-12-31 RX ADMIN — AZITHROMYCIN DIHYDRATE 500 MG: 250 TABLET ORAL at 09:32

## 2023-12-31 RX ADMIN — FAMOTIDINE 20 MG: 20 TABLET, FILM COATED ORAL at 20:46

## 2023-12-31 RX ADMIN — BUDESONIDE 0.5 MG: 0.5 SUSPENSION RESPIRATORY (INHALATION) at 07:04

## 2023-12-31 RX ADMIN — ENOXAPARIN SODIUM 40 MG: 100 INJECTION SUBCUTANEOUS at 17:22

## 2023-12-31 NOTE — PLAN OF CARE
Problem: Adult Inpatient Plan of Care  Goal: Plan of Care Review  Outcome: Ongoing, Progressing  Flowsheets (Taken 12/31/2023 0456)  Progress: improving  Outcome Evaluation: Patient resting well without C/O pain or discomfort. VSS.  Patient on 3L NC tolerated well. Continue plan of  care.  Goal: Patient-Specific Goal (Individualized)  Outcome: Ongoing, Progressing  Goal: Absence of Hospital-Acquired Illness or Injury  Outcome: Ongoing, Progressing  Intervention: Prevent and Manage VTE (Venous Thromboembolism) Risk  Recent Flowsheet Documentation  Taken 12/30/2023 2132 by China Johnson RN  Range of Motion: active ROM (range of motion) encouraged  Goal: Optimal Comfort and Wellbeing  Outcome: Ongoing, Progressing  Intervention: Provide Person-Centered Care  Recent Flowsheet Documentation  Taken 12/30/2023 2132 by China Johnson, RN  Trust Relationship/Rapport:   care explained   choices provided   emotional support provided   empathic listening provided   questions answered   questions encouraged   reassurance provided   thoughts/feelings acknowledged  Goal: Readiness for Transition of Care  Outcome: Ongoing, Progressing     Problem: Asthma Comorbidity  Goal: Maintenance of Asthma Control  Outcome: Ongoing, Progressing     Problem: Behavioral Health Comorbidity  Goal: Maintenance of Behavioral Health Symptom Control  Outcome: Ongoing, Progressing     Problem: COPD (Chronic Obstructive Pulmonary Disease) Comorbidity  Goal: Maintenance of COPD Symptom Control  Outcome: Ongoing, Progressing  Intervention: Maintain COPD-Symptom Control  Recent Flowsheet Documentation  Taken 12/30/2023 2132 by China Johnson, RN  Supportive Measures: active listening utilized     Problem: Skin Injury Risk Increased  Goal: Skin Health and Integrity  Outcome: Ongoing, Progressing     Problem: Fall Injury Risk  Goal: Absence of Fall and Fall-Related Injury  Outcome: Ongoing, Progressing   Goal Outcome Evaluation:            Progress: improving  Outcome Evaluation: Patient resting well without C/O pain or discomfort. VSS.  Patient on 3L NC tolerated well. Continue plan of  care.

## 2023-12-31 NOTE — PROGRESS NOTES
[facility]   Hospitalist Progress Note  Date: 2023  Patient Name: Selene Forrest  : 1955  MRN: [id number]  Date of admission: 2023    Subjective   Subjective     Chief Complaint: Shortness of breath    Summary:   Selene Forrest is a 68 y.o. female with depression, hypothyroidism, COPD that presented with complaints of shortness of breath.  Eval in ED significant for patient being hypoxic.  Chest xray did not show any active disease.  Treatment for COPD exacerbation started.  DuoNebs and IV steroids.  Patient's respiratory status improved.    Interval Followup:   No acute events overnight.  Patient stated her breathing continues to improve.  Still is short of air with some exertion but is improving.  She denies any chest pain.  Nursing with no additional acute issues to report.    Antibiotics:   -Azithromycin    Objective   Objective     Vitals:   Temp:  [97.4 °F (36.3 °C)-97.8 °F (36.6 °C)] 97.8 °F (36.6 °C)  Heart Rate:  [65-80] 70  Resp:  [18] 18  BP: (119-155)/(56-85) 148/80  Flow (L/min):  [2-3] 2  Physical Exam   Gen: No acute distress, Conversant, Pleasant, lying in bed  Resp: CTAB, minimal expiratory wheezing noted throughout, equal chest rise bilaterally, no increase in work of breathing  Card: RRR, No m/r/g  Abd: Soft, Nontender, Nondistended, + bowel sounds    Result Review    Result Review:  I have personally reviewed the results as below and agree with these findings:  []  Laboratory:   CMP          2023    05:00 2023    09:01 2023    07:01   CMP   Glucose 198  185  118    BUN 26  19  17    Creatinine 0.92  0.86  0.70    EGFR 68.4  74.2  94.9    Sodium 132  135  136    Potassium 4.3  3.9  4.1    Chloride 96  99  99    Calcium 9.5  9.7  9.1    Total Protein 6.9  7.0  6.2    Albumin 3.0  3.2  3.0    Globulin 3.9  3.8  3.2    Total Bilirubin 0.2  0.3  0.3    Alkaline Phosphatase 78  82  69    AST (SGOT) 70  34  20    ALT (SGPT) 141  126  88     Albumin/Globulin Ratio 0.8  0.8  0.9    BUN/Creatinine Ratio 28.3  22.1  24.3    Anion Gap 12.6  10.8  9.2      CBC          12/28/2023    05:02 12/30/2023    09:01 12/31/2023    07:01   CBC   WBC 9.65  15.48  13.70    RBC 4.99  5.05  4.69    Hemoglobin 15.8  15.7  14.6    Hematocrit 47.4  47.4  44.0    MCV 95.0  93.9  93.8    MCH 31.7  31.1  31.1    MCHC 33.3  33.1  33.2    RDW 15.1  14.6  14.5    Platelets 252  325  336    Calcium: 9.1.  Protocol: 2.56 (12/28/2023)  []  Microbiology:   []  Radiology:   []  EKG/Telemetry:    []  Cardiology/Vascular:    []  Pathology:  []  Old records:  []  Other:    Assessment & Plan   Assessment / Plan     Assessment:  Acute COPD exacerbation  Acute hypoxic respiratory failure secondary to above  Hypothyroidism  Depression    Plan:  -Continue supplemental O2 to maintain sats greater than 90%, wean as tolerated  -Continue Brovana, Pulmicort  -Continue azithromycin for COPD exacerbation  -Continue prednisone 40 mg daily, treat for total 5 days of steroid therapy  -Continue home levothyroxine  -As needed hydroxyzine available for anxiety  -Will monitor electrolytes and renal function with BMP and magnesium level in the AM  -Will monitor WBC and Hgb with CBC in the AM  -Clinical course will dictate further management     DVT Prophylaxis: Lovenox  GI Prophylaxis: Pepcid  Diet: Regular  Dispo: Home when medically appropriate for discharge  Code Status: Full code     Personally reviewed patients labs and imaging, discussed with patient and nurse at bedside.     Part of this note may be an electronic transcription/translation of spoken language to printed text using the Dragon dictation system.    DVT prophylaxis:  Medical DVT prophylaxis orders are present.    CODE STATUS:   Level Of Support Discussed With: Patient  Code Status (Patient has no pulse and is not breathing): CPR (Attempt to Resuscitate)  Medical Interventions (Patient has pulse or is breathing): Full  Support      Electronically signed by Duane Alfaro MD, 12/31/23, 11:51 AM EST.

## 2024-01-01 ENCOUNTER — READMISSION MANAGEMENT (OUTPATIENT)
Dept: CALL CENTER | Facility: HOSPITAL | Age: 69
End: 2024-01-01
Payer: MEDICARE

## 2024-01-01 VITALS
OXYGEN SATURATION: 90 % | BODY MASS INDEX: 28.93 KG/M2 | TEMPERATURE: 97.9 F | HEIGHT: 66 IN | RESPIRATION RATE: 20 BRPM | HEART RATE: 85 BPM | DIASTOLIC BLOOD PRESSURE: 65 MMHG | SYSTOLIC BLOOD PRESSURE: 127 MMHG | WEIGHT: 180 LBS

## 2024-01-01 LAB
ANION GAP SERPL CALCULATED.3IONS-SCNC: 8.8 MMOL/L (ref 5–15)
BUN SERPL-MCNC: 18 MG/DL (ref 8–23)
BUN/CREAT SERPL: 26.1 (ref 7–25)
CALCIUM SPEC-SCNC: 8.8 MG/DL (ref 8.6–10.5)
CHLORIDE SERPL-SCNC: 99 MMOL/L (ref 98–107)
CO2 SERPL-SCNC: 27.2 MMOL/L (ref 22–29)
CREAT SERPL-MCNC: 0.69 MG/DL (ref 0.57–1)
DEPRECATED RDW RBC AUTO: 49.4 FL (ref 37–54)
EGFRCR SERPLBLD CKD-EPI 2021: 95.3 ML/MIN/1.73
ERYTHROCYTE [DISTWIDTH] IN BLOOD BY AUTOMATED COUNT: 14.3 % (ref 12.3–15.4)
GLUCOSE SERPL-MCNC: 95 MG/DL (ref 65–99)
HCT VFR BLD AUTO: 43.9 % (ref 34–46.6)
HGB BLD-MCNC: 14.5 G/DL (ref 12–15.9)
MAGNESIUM SERPL-MCNC: 2 MG/DL (ref 1.6–2.4)
MCH RBC QN AUTO: 30.8 PG (ref 26.6–33)
MCHC RBC AUTO-ENTMCNC: 33 G/DL (ref 31.5–35.7)
MCV RBC AUTO: 93.2 FL (ref 79–97)
PHOSPHATE SERPL-MCNC: 4 MG/DL (ref 2.5–4.5)
PLATELET # BLD AUTO: 371 10*3/MM3 (ref 140–450)
PMV BLD AUTO: 10.3 FL (ref 6–12)
POTASSIUM SERPL-SCNC: 3.7 MMOL/L (ref 3.5–5.2)
RBC # BLD AUTO: 4.71 10*6/MM3 (ref 3.77–5.28)
SODIUM SERPL-SCNC: 135 MMOL/L (ref 136–145)
WBC NRBC COR # BLD AUTO: 19.02 10*3/MM3 (ref 3.4–10.8)

## 2024-01-01 PROCEDURE — 94799 UNLISTED PULMONARY SVC/PX: CPT

## 2024-01-01 PROCEDURE — 94618 PULMONARY STRESS TESTING: CPT

## 2024-01-01 PROCEDURE — 84100 ASSAY OF PHOSPHORUS: CPT | Performed by: INTERNAL MEDICINE

## 2024-01-01 PROCEDURE — 83735 ASSAY OF MAGNESIUM: CPT | Performed by: INTERNAL MEDICINE

## 2024-01-01 PROCEDURE — 80048 BASIC METABOLIC PNL TOTAL CA: CPT | Performed by: INTERNAL MEDICINE

## 2024-01-01 PROCEDURE — 94664 DEMO&/EVAL PT USE INHALER: CPT

## 2024-01-01 PROCEDURE — 85027 COMPLETE CBC AUTOMATED: CPT | Performed by: INTERNAL MEDICINE

## 2024-01-01 PROCEDURE — 63710000001 PREDNISONE PER 1 MG: Performed by: FAMILY MEDICINE

## 2024-01-01 RX ORDER — ALBUTEROL SULFATE 90 UG/1
2 AEROSOL, METERED RESPIRATORY (INHALATION) EVERY 4 HOURS PRN
Qty: 18 G | Refills: 0 | Status: SHIPPED | OUTPATIENT
Start: 2024-01-01 | End: 2024-01-08 | Stop reason: SDUPTHER

## 2024-01-01 RX ORDER — PREDNISONE 20 MG/1
40 TABLET ORAL
Qty: 6 TABLET | Refills: 0 | Status: SHIPPED | OUTPATIENT
Start: 2024-01-02 | End: 2024-01-05

## 2024-01-01 RX ADMIN — IPRATROPIUM BROMIDE AND ALBUTEROL SULFATE 3 ML: 2.5; .5 SOLUTION RESPIRATORY (INHALATION) at 07:30

## 2024-01-01 RX ADMIN — Medication 10 ML: at 08:17

## 2024-01-01 RX ADMIN — FLUOXETINE HYDROCHLORIDE 40 MG: 20 CAPSULE ORAL at 08:17

## 2024-01-01 RX ADMIN — PREDNISONE 40 MG: 20 TABLET ORAL at 08:17

## 2024-01-01 RX ADMIN — AZITHROMYCIN DIHYDRATE 500 MG: 250 TABLET ORAL at 08:17

## 2024-01-01 RX ADMIN — ARFORMOTEROL TARTRATE 15 MCG: 15 SOLUTION RESPIRATORY (INHALATION) at 07:30

## 2024-01-01 RX ADMIN — BUDESONIDE 0.5 MG: 0.5 SUSPENSION RESPIRATORY (INHALATION) at 07:30

## 2024-01-01 RX ADMIN — LEVOTHYROXINE SODIUM 25 MCG: 0.03 TABLET ORAL at 05:35

## 2024-01-01 NOTE — OUTREACH NOTE
Prep Survey      Flowsheet Row Responses   [facility] facility patient discharged from? [facility]   Is LACE score < 7 ? No   Eligibility [facility]   Hospital [facility]   Date of Admission 12/27/23   Date of Discharge 01/01/24   Discharge Disposition Home or Self Care   Discharge diagnosis COPD exac   Does the patient have one of the following disease processes/diagnoses(primary or secondary)? COPD   Does the patient have Home health ordered? No   Is there a DME ordered? Yes   What DME was ordered? [employer] for DME   Prep survey completed? Yes            NERISSA A - Registered Nurse

## 2024-01-01 NOTE — DISCHARGE SUMMARY
[facility]         HOSPITALIST  DISCHARGE SUMMARY    Patient Name: Selene Forrest  : 1955  MRN: [id number]    Date of Admission: 2023  Date of Discharge:  24  Primary Care Physician: Gwen Chavez APRN    Hospital Problems:  Acute COPD exacerbation  Acute hypoxic respiratory failure secondary to above  Hypothyroidism  Depression    Hospital Course     Hospital Course:  Selene Forrest is a 68 y.o. female  with depression, hypothyroidism, COPD that presented with complaints of shortness of breath.  Eval in ED significant for patient being hypoxic.  Chest xray did not show any active disease.  Treatment for COPD exacerbation started.  DuoNebs and IV steroids.  Patient's respiratory status improved.  Patient will complete prednisone therapy after discharge.  Prior to discharge home walk test performed to evaluate need for home O2, patient set up for home O2 prior to discharge home.  On day of discharge patient hemodynamically stable and no additional inpatient evaluation or workup at this time, patient was discharged home with outpatient follow-up.    DISCHARGE Follow Up Recommendations for labs and diagnostics:   -Follow-up with PCP in 3 to 5 days    Day of Discharge     Vital Signs:  Temp:  [97.5 °F (36.4 °C)-98.4 °F (36.9 °C)] 97.5 °F (36.4 °C)  Heart Rate:  [70-87] 70  Resp:  [18-22] 20  BP: (125-152)/(58-79) 145/79  Flow (L/min):  [2] 2  Physical Exam:   Gen: No acute distress, sitting up in chair at bedside, conversant, pleasant  Resp: CTAB, good aeration, normal respiratory effort, equal chest rise bilaterally  Card: RRR, No m/r/g  Abd: Soft, Nontender, Nondistended, + bowel sounds     Discharge Details        Discharge Medications        New Medications        Instructions Start Date   albuterol sulfate  (90 Base) MCG/ACT inhaler  Commonly known as: PROVENTIL HFA;VENTOLIN HFA;PROAIR HFA   2 puffs, Inhalation, Every 4 Hours PRN      predniSONE 20 MG  tablet  Commonly known as: DELTASONE   40 mg, Oral, Daily With Breakfast   Start Date: January 2, 2024            Changes to Medications        Instructions Start Date   FLUoxetine 20 MG capsule  Commonly known as: PROzac  What changed: how much to take   TAKE 1 CAPSULE EVERY DAY      levothyroxine 25 MCG tablet  Commonly known as: SYNTHROID, LEVOTHROID  What changed: when to take this   TAKE 1 TABLET EVERY DAY             Continue These Medications        Instructions Start Date   Breztri Aerosphere 160-9-4.8 MCG/ACT aerosol inhaler  Generic drug: Budeson-Glycopyrrol-Formoterol   2 puffs, Inhalation, 2 Times Daily               No Known Allergies    Discharge Disposition:  Home or Self Care    Diet:  Hospital:  Diet Order   Procedures    Diet: Regular/House Diet; Texture: Regular Texture (IDDSI 7); Fluid Consistency: Thin (IDDSI 0)       Discharge Activity:   Activity Instructions       Activity as Tolerated              CODE STATUS:  Code Status and Medical Interventions:   Ordered at: 12/28/23 0011     Level Of Support Discussed With:    Patient     Code Status (Patient has no pulse and is not breathing):    CPR (Attempt to Resuscitate)     Medical Interventions (Patient has pulse or is breathing):    Full Support       Future Appointments   Date Time Provider Department Center   3/25/2024  1:15 PM Gwen Chavez APRN [facility]   3/26/2024  1:30 PM Gwen Chavez APRN [facility]       Additional Instructions for the Follow-ups that You Need to Schedule       Discharge Follow-up with PCP   As directed       Currently Documented PCP:    Gwen Chavez APRN    PCP Phone Number:    [phone]     Follow Up Details: Follow-up in 3-5 days                Pertinent  and/or Most Recent Results     RADIOLOGY:  XR Chest 1 View [[id number]] Nico as Reviewed   Order Status: Completed Collected: 12/27/23 2003    Updated: 12/27/23 2006   Narrative:     PROCEDURE: XR CHEST 1 VW     COMPARISON: [facility], CR, XR CHEST PA AND LATERAL, 9/13/2021, 16:03.     INDICATIONS: LEFT SIDED CHEST PAIN     FINDINGS:  The heart is normal in size.  The lungs have a hyperexpanded appearance consistent with emphysema.     Chronic changes at the lung bases appear stable.     Bony structures appear intact.      Impression:       Emphysema.     No active disease is seen.                  MILY KUMARI MD        Electronically Signed and Approved By: MILY KUMARI MD on 12/27/2023 at 20:03     LAB RESULTS:      Lab 01/01/24  0559 12/31/23  0701 12/30/23  0901 12/28/23  0842 12/28/23  0502 12/27/23 2111 12/27/23  1857   WBC 19.02* 13.70* 15.48*  --  9.65 12.23*  --    HEMOGLOBIN 14.5 14.6 15.7  --  15.8 15.4  --    HEMATOCRIT 43.9 44.0 47.4*  --  47.4* 44.8  --    PLATELETS 371 336 325  --  252 272  --    NEUTROS ABS  --  10.10* 12.84*  --  8.66* 9.80*  --    IMMATURE GRANS (ABS)  --  0.13* 0.18*  --  0.04 0.05  --    LYMPHS ABS  --  2.13 1.53  --  0.57* 0.90  --    MONOS ABS  --  1.32* 0.90  --  0.35 1.44*  --    EOS ABS  --  0.01 0.00  --  0.00 0.01  --    MCV 93.2 93.8 93.9  --  95.0 90.9  --    PROCALCITONIN  --   --   --  2.56*  --   --   --    LACTATE, ARTERIAL  --   --   --   --   --   --  1.16         Lab 01/01/24  0559 12/31/23  0701 12/30/23  0901 12/29/23  0500 12/28/23  0842 12/27/23  2332 12/27/23 2111 12/27/23  1857   SODIUM 135* 136 135* 132* 134*  --    < >  --    SODIUM, ARTERIAL  --   --   --   --   --   --   --  133.8*   POTASSIUM 3.7 4.1 3.9 4.3 4.1  --    < >  --    CHLORIDE 99 99 99 96* 98  --    < >  --    CO2 27.2 27.8 25.2 23.4 23.1  --    < >  --    ANION GAP 8.8 9.2 10.8 12.6 12.9  --    < >  --    BUN 18 17 19 26* 26*  --    < >  --    CREATININE 0.69 0.70 0.86 0.92 0.86  --    < >  --    EGFR 95.3 94.9 74.2 68.4 74.2  --    < >  --    GLUCOSE 95 118* 185* 198* 187*  --    < >  --    GLUCOSE, ARTERIAL  --   --   --   --   --   --   --  132*   CALCIUM 8.8 9.1 9.7 9.5 9.4  --    < >  --     IONIZED CALCIUM  --   --   --   --   --   --   --  1.18   MAGNESIUM 2.0  --   --   --   --   --   --   --    PHOSPHORUS 4.0  --   --   --   --   --   --   --    TSH  --   --   --   --   --  3.340  --   --     < > = values in this interval not displayed.         Lab 12/31/23  0701 12/30/23  0901 12/29/23  0500 12/27/23 2111   TOTAL PROTEIN 6.2 7.0 6.9 7.4   ALBUMIN 3.0* 3.2* 3.0* 3.4*   GLOBULIN 3.2 3.8 3.9 4.0   ALT (SGPT) 88* 126* 141* 94*   AST (SGOT) 20 34* 70* 115*   BILIRUBIN 0.3 0.3 0.2 0.4   ALK PHOS 69 82 78 82         Lab 12/28/23  0143 12/27/23  2332 12/27/23 2111   PROBNP  --   --  593.3   HSTROP T 21* 23* 23*             Lab 12/27/23 2111   FOLATE 11.40   VITAMIN B 12 382         Lab 12/27/23  1857   PH, ARTERIAL 7.440   PCO2, ARTERIAL 35.2   PO2 ART 75.6*   O2 SATURATION ART 94.4*   FIO2 28   HCO3 ART 23.4   BASE EXCESS ART -0.1   CARBOXYHEMOGLOBIN 1.0     Brief Urine Lab Results  (Last result in the past 365 days)        Color   Clarity   Blood   Leuk Est   Nitrite   Protein   CREAT   Urine HCG        12/28/23 0315 Yellow   Cloudy   Large (3+)   Large (3+)   Negative   30 mg/dL (1+)                 Microbiology Results (last 10 days)       Procedure Component Value - Date/Time    MRSA Screen, PCR (Inpatient) - Swab, Nares [[id number]]  (Normal) Collected: 12/28/23 0417    Lab Status: Final result Specimen: Swab from Nares Updated: 12/28/23 0541     MRSA PCR No MRSA Detected    Narrative:      The negative predictive value of this diagnostic test is high and should only be used to consider de-escalating anti-MRSA therapy. A positive result may indicate colonization with MRSA and must be correlated clinically.    Legionella Antigen, Urine - Urine, Urine, Clean Catch [[id number]]  (Abnormal) Collected: 12/28/23 0315    Lab Status: Final result Specimen: Urine, Clean Catch Updated: 12/28/23 0639     LEGIONELLA ANTIGEN, URINE Positive    S. Pneumo Ag Urine or CSF - Urine, Urine, Clean Catch [[id number]]   (Normal) Collected: 12/28/23 0315    Lab Status: Final result Specimen: Urine, Clean Catch Updated: 12/28/23 0639     Strep Pneumo Ag Negative    Urine Culture - Urine, Urine, Clean Catch [[id number]] Collected: 12/28/23 0315    Lab Status: Final result Specimen: Urine, Clean Catch Updated: 12/29/23 1205     Urine Culture >100,000 CFU/mL Mixed Debbie Isolated    Narrative:      Specimen contains mixed organisms of questionable pathogenicity suggestive of contamination. If symptoms persist, suggest recollection.  Colonization of the urinary tract without infection is common. Treatment is discouraged unless the patient is symptomatic, pregnant, or undergoing an invasive urologic procedure.    Mycoplasma Pneumoniae Antibody, IgM - Blood, Arm, Left [[id number]]  (Normal) Collected: 12/28/23 0143    Lab Status: Final result Specimen: Blood from Arm, Left Updated: 12/28/23 0223     Mycoplasma pneumo IgM Negative    COVID-19, FLU A/B, RSV PCR 1 HR TAT - Swab, Nasopharynx [[id number]]  (Normal) Collected: 12/27/23 2240    Lab Status: Final result Specimen: Swab from Nasopharynx Updated: 12/27/23 2326     COVID19 Not Detected     Influenza A PCR Not Detected     Influenza B PCR Not Detected     RSV, PCR Not Detected    Narrative:      Fact sheet for providers: https://www.fda.gov/media/956766/download    Fact sheet for patients: https://www.fda.gov/media/682656/download    Test performed by PCR.    COVID PRE-OP / PRE-PROCEDURE SCREENING ORDER (NO ISOLATION) - Swab, Nasopharynx [[id number]]  (Normal) Collected: 12/27/23 2116    Lab Status: Final result Specimen: Swab from Nasopharynx Updated: 12/27/23 2205    Narrative:      The following orders were created for panel order COVID PRE-OP / PRE-PROCEDURE SCREENING ORDER (NO ISOLATION) - Swab, Nasopharynx.  Procedure                               Abnormality         Status                     ---------                               -----------         ------                      COVID-19,CEPHEID/DAVIAN,CO...[[id number]]  Normal              Final result                 Please view results for these tests on the individual orders.    COVID-19,CEPHEID/DAVIAN,COR/YOLANDA/PAD/ROMAN/LAG IN-HOUSE,NP SWAB IN TRANSPORT MEDIA 1 HR TAT, RT-PCR - Swab, Nasopharynx [[id number]]  (Normal) Collected: 12/27/23 2116    Lab Status: Final result Specimen: Swab from Nasopharynx Updated: 12/27/23 2205     COVID19 Not Detected    Narrative:      Fact sheet for providers: https://www.fda.gov/media/552090/download     Fact sheet for patients: https://www.fda.gov/media/721665/download  Fact sheet for providers: https://www.fda.gov/media/031415/download     Fact sheet for patients: https://www.fda.gov/media/850522/download              Time spent on Discharge including face to face service:  32 minutes    Electronically signed by Duane Alfaro MD, 01/01/24, 7:58 AM EST.

## 2024-01-01 NOTE — PROCEDURES
Respiratory Therapist Walking Oximetry Progress Note      Patient Name:  Selene Forrest  YOB: 1955  Date of Procedure: 01/01/24              ROOM AIR BASELINE   SpO2% 87   Heart Rate 93     EXERCISE ON ROOM AIR SpO2% EXERCISE ON O2 LPM SpO2%   1 MINUTE  1 MINUTE 1 87   2 MINUTES  2 MINUTES 2 88   3 MINUTES  3 MINUTES 2 90   4 MINUTES  4 MINUTES 2 90   5 MINUTES  5 MINUTES 2 90   6 MINUTES  6 MINUTES 2 90              SpO2% Post Exercise  90   HR Post Exercise  91   Time to Recovery  0     Comments: On room air and 1L of O2, the patients saturations dropped to 87%. Once placed on 2L nasal cannula O2 saturations came up to 90%.           Electronically signed by Marlyn Caputo RRT, 01/01/24, 12:09 PM EST.

## 2024-01-02 ENCOUNTER — TRANSITIONAL CARE MANAGEMENT TELEPHONE ENCOUNTER (OUTPATIENT)
Dept: CALL CENTER | Facility: HOSPITAL | Age: 69
End: 2024-01-02
Payer: MEDICARE

## 2024-01-02 NOTE — OUTREACH NOTE
Call Center TCM Note      Flowsheet Row Responses   [facility] patient discharged from? [facility]   Does the patient have one of the following disease processes/diagnoses(primary or secondary)? COPD   TCM attempt successful? Yes  [no names listed on verbal release]   Call start time 1342   Call end time 1351   Discharge diagnosis COPD exac   Meds reviewed with patient/caregiver? Yes   Is the patient having any side effects they believe may be caused by any medication additions or changes? No   Does the patient have all medications ordered at discharge? No   What is keeping the patient from filling the prescriptions? Financial   Nursing Interventions Nurse provided patient education   Prescription comments Pt had  not picked up steroid yet due to cost,  disussed cost, benefits of use--pt will  in the morning and take as directed.  Reports she has an albuterol inhaler for use   Is the patient taking all medications as directed (includes completed medication regime)? Yes   Comments Hospital f/u on 1/11/24@0930am   Does the patient have an appointment with their PCP within 7-14 days of discharge? Yes   Has home health visited the patient within 72 hours of discharge? N/A   DME comments Uses O2 at home prn   Pulse Ox monitoring Intermittent   Pulse Ox device source Patient   O2 Sat comments O2 sats w/o O2   O2 Sat: education provided Sat levels   Psychosocial issues? No   Did the patient receive a copy of their discharge instructions? Yes   Nursing interventions Reviewed instructions with patient   What is the patient's perception of their health status since discharge? Improving  [Pt reports she feels improved, denies any worsening SOA.  Encouraged to monitor for increased resp s/s, use O2 and MDI as directed, start steroids as directed--v/u.]   Nursing Interventions Nurse provided patient education   If the patient is a current smoker, are they able to teach back resources for cessation? Not a smoker   Is the  patient/caregiver able to teach back the hierarchy of who to call/visit for symptoms/problems? PCP, Specialist, Home health nurse, Urgent Care, ED, 911 Yes   TCM call completed? Yes   Call end time 1351            Lanette Triplett RN    1/2/2024, 13:55 EST

## 2024-01-05 NOTE — PROGRESS NOTES
Primary Care Provider  Gwen Chavez APRN   Referring Provider  Yogesh Ren DO    Patient Complaint  Establish Care and Shortness of Breath      SUBJECTIVE    History of Presenting Illness  Selene Forrest is a pleasant 68 y.o. female who presents to [facility]UL[facility]IC[facility] for hospital follow-up to the COPD clinic.  Patient was at [facility] 12/27/2023 through 1/1/2024 for acute COPD exacerbation, acute hypoxic respiratory failure.  Hospital course includes the following:Selene Forrest is a 68 y.o. female  with depression, hypothyroidism, COPD that presented with complaints of shortness of breath.  Eval in ED significant for patient being hypoxic.  Chest xray did not show any active disease.  Treatment for COPD exacerbation started.  DuoNebs and IV steroids.  Patient's respiratory status improved.  Patient will complete prednisone therapy after discharge.  Prior to discharge home walk test performed to evaluate need for home O2, patient set up for home O2 prior to discharge home.  On day of discharge patient hemodynamically stable and no additional inpatient evaluation or workup at this time, patient was discharged home with outpatient follow-up.     Patient presents today doing fairly well at this time after her hospitalization.  At present time she denies dyspnea, coughing, wheezing, headaches, chest pain, weight loss or hemoptysis. Denies fevers, chills and night sweats. Selene Forrest is able to perform ADLs without difficulties and denies any swollen glands/lymph nodes in the head or neck.  Patient states she does have to but has not using it at this time her saturations 96% she would like to to send it back to the [facility], [facility] care, if she can pass her 6-minute walk in office.  Patient does have an incentive spirometer at home but has not been using it very much.  She continues on Breztri 2 puffs twice daily which is prescribed by her PCP.  She  does have albuterol inhaler and will use it as needed for shortness of air or wheeze she will need refills on her albuterol inhaler.  Patient does not chest vest no NIPPV or does not have sleep apnea with no CPAP or BiPAP use.  Patient does have a family history of COPD in her mother and breast cancer in her mother.  She is not aware of any other diseases in her family.  Patient does not have any cardiac history.  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    I have personally reviewed the review of systems, past family, social, medical and surgical histories; and agree with their findings.    Review of Systems  Constitutional symptoms:  Denied complaints   Ear, nose, throat: Denied complaints  Cardiovascular:  Denied complaints  Respiratory: Denied complaints  Gastrointestinal: Denied complaints  Musculoskeletal: Denied complaints    Family History   Problem Relation Age of Onset    Stroke Mother     Hypertension Mother     Breast cancer Mother     Heart disease Father     Heart disease Brother     Stroke Maternal Grandfather     Heart disease Paternal Grandmother     Heart disease Paternal Grandfather     Breast cancer Other     Breast cancer Other     Diabetes Other         Social History     Socioeconomic History    Marital status: Single   Tobacco Use    Smoking status: Former     Packs/day: 2.00     Years: 40.00     Additional pack years: 0.00     Total pack years: 80.00     Types: Cigarettes    Smokeless tobacco: Never    Tobacco comments:     started at 21   Vaping Use    Vaping Use: Never used   Substance and Sexual Activity    Alcohol use: Yes     Comment: OCC    Drug use: Never    Sexual activity: Defer        Past Medical History:   Diagnosis Date    Abdominal hernia     COPD (chronic obstructive pulmonary disease)     SOB AT TIMES, HX SMOKER PER PT- NO INHALERS- DENIED ANY SOB AT THIS TIME    Depression     Disease of thyroid gland     Hx of bone density study 08/13/2019     "Ventral hernia     Visit for screening mammogram 01/14/2019        Immunization History   Administered Date(s) Administered    Fluzone Quad >6mos (Multi-dose) 11/12/2019       No Known Allergies       Current Outpatient Medications:     albuterol sulfate  (90 Base) MCG/ACT inhaler, Inhale 2 puffs Every 4 (Four) Hours As Needed for Wheezing or Shortness of Air., Disp: 18 g, Rfl: 5    Budeson-Glycopyrrol-Formoterol (Breztri Aerosphere) 160-9-4.8 MCG/ACT aerosol inhaler, Inhale 2 puffs 2 (Two) Times a Day., Disp: 3 each, Rfl: 2    FLUoxetine (PROzac) 20 MG capsule, TAKE 1 CAPSULE EVERY DAY (Patient taking differently: Take 2 capsules by mouth Daily.), Disp: 90 capsule, Rfl: 1    levothyroxine (SYNTHROID, LEVOTHROID) 25 MCG tablet, TAKE 1 TABLET EVERY DAY (Patient taking differently: Take 1 tablet by mouth Every Morning.), Disp: 90 tablet, Rfl: 1           Vital Signs   /48 (BP Location: Right arm, Patient Position: Sitting, Cuff Size: Adult)   Pulse 94   Temp 97.3 °F (36.3 °C) (Temporal)   Resp 18   Ht 160 cm (63\")   Wt 82.8 kg (182 lb 9.6 oz)   SpO2 96% Comment: ROOM AIR  BMI 32.35 kg/m²       OBJECTIVE    Physical Exam  Vital Signs Reviewed   WDWN, Alert, NAD, obese.    HEENT:  PERRL, EOMI.  OP, nares clear  Neck:  Supple, no JVD, no thyromegaly  Chest:  good aeration, clear to auscultation bilaterally, tympanic to percussion bilaterally, no work of breathing noted  CV: RRR, no MGR, pulses 2+, equal.  Abd:  Soft, NT, ND, + BS, no HSM  EXT:  no clubbing, no cyanosis, no edema  Neuro:  A&Ox3, CN grossly intact, no focal deficits.  Skin: No rashes or lesions noted    Results Review  I have personally reviewed the prior office notes, hospital records, labs, and diagnostics.  XR Chest 1 View [[id number]] (Order [id number])  Order  Status: Final result     Appointment Information    PACS Images     Radiology Images  Study Result    Narrative & Impression   PROCEDURE:  XR CHEST 1 VW     COMPARISON: " [facility], CR, XR CHEST PA AND LATERAL, 9/13/2021, 16:03.     INDICATIONS:  LEFT SIDED CHEST PAIN     FINDINGS:          The heart is normal in size.  The lungs have a hyperexpanded appearance consistent with emphysema.     Chronic changes at the lung bases appear stable.     Bony structures appear intact.     IMPRESSION:                 Emphysema.     No active disease is seen.                  MILY KUMARI MD         Electronically Signed and Approved By: MILY KUMARI MD on 12/27/2023 at 20:03     CBC (No Diff)  Order: [id number]  Status: Final result       Visible to patient: No (not released)       Next appt: 01/08/2024 at 11:30 AM in Pulmonology (KIM Ayers)    Specimen Information: Arm, Right; Blood   0 Result Notes            Component  Ref Range & Units 6 d ago  (1/1/24) 7 d ago  (12/31/23) 8 d ago  (12/30/23) 10 d ago  (12/28/23) 11 d ago  (12/27/23) 1 yr ago  (11/4/22) 1 yr ago  (3/18/22)   WBC  3.40 - 10.80 10*3/mm3 19.02 High  13.70 High  15.48 High  9.65 12.23 High  10.11 9.68   RBC  3.77 - 5.28 10*6/mm3 4.71 4.69 5.05 4.99 4.93 4.98 4.60   Hemoglobin  12.0 - 15.9 g/dL 14.5 14.6 15.7 15.8 15.4 16.1 High  14.6   Hematocrit  34.0 - 46.6 % 43.9 44.0 47.4 High  47.4 High  44.8 47.6 High  42.8   MCV  79.0 - 97.0 fL 93.2 93.8 93.9 95.0 90.9 95.6 93.0   MCH  26.6 - 33.0 pg 30.8 31.1 31.1 31.7 31.2 32.3 31.7   MCHC  31.5 - 35.7 g/dL 33.0 33.2 33.1 33.3 34.4 33.8 34.1   RDW  12.3 - 15.4 % 14.3 14.5 14.6 15.1 14.2 13.3 12.9   RDW-SD  37.0 - 54.0 fl 49.4 49.9 50.6 52.1 47.5 46.5 43.5   MPV  6.0 - 12.0 fL 10.3 10.3 10.2 10.7 9.7 9.6 9.5   Platelets  140 - 450 10*3/mm3 371 336 325 252 272 382 460 [facility]              Specimen Collected: 01/01/24 05:59 EST Last Resulted: 01/01/24 06:32 EST             Basic Metabolic Panel  Order: [id number]  Status: Final result       Visible to patient: No  (not released)       Next appt: 01/08/2024 at 11:30 AM in Pulmonology (Maria Elena Mcdowell, KIM)    Specimen Information: Arm, Right; Blood   0 Result Notes            Component  Ref Range & Units 6 d ago  (1/1/24) 7 d ago  (12/31/23) 8 d ago  (12/30/23) 9 d ago  (12/29/23) 10 d ago  (12/28/23) 11 d ago  (12/27/23) 1 yr ago  (11/4/22)   Glucose  65 - 99 mg/dL 95 118 High  185 High  198 High  187 High  159 High  100 High    BUN  8 - 23 mg/dL 18 17 19 26 High  26 High  28 High  6 Low    Creatinine  0.57 - 1.00 mg/dL 0.69 0.70 0.86 0.92 0.86 0.95 0.78   Sodium  136 - 145 mmol/L 135 Low  136 135 Low  132 Low  134 Low  135 Low  136   Potassium  3.5 - 5.2 mmol/L 3.7 4.1 3.9 4.3 4.1 3.6 4.4   Comment: Slight hemolysis detected by analyzer. Result may be falsely elevated.   Chloride  98 - 107 mmol/L 99 99 99 96 Low  98 94 Low  98   CO2  22.0 - 29.0 mmol/L 27.2 27.8 25.2 23.4 23.1 24.1 25.8   Calcium  8.6 - 10.5 mg/dL 8.8 9.1 9.7 9.5 9.4 9.5 9.6   BUN/Creatinine Ratio  7.0 - 25.0 26.1 High  24.3 22.1 28.3 High  30.2 High  29.5 High  7.7   Anion Gap  5.0 - 15.0 mmol/L 8.8 9.2 10.8 12.6 12.9 16.9 High  12.2   eGFR  >60.0 mL/min/1.73 95.3 94.9 74.2 68.4 74.2 65.8 83.9 CM   Resulting Agency [facility]  [facility]  [facility]  [facility]              Narrative  Performed by: [facility]  GFR Normal >60  Chronic Kidney Disease <60  Kidney Failure <15      Specimen Collected: 01/01/24 05:59 EST Last Resulted: 01/01/24 06:49 EST               ASSESSMENT         Patient Active Problem List   Diagnosis    Abnormal bone density screening    Depression    Incisional hernia, without obstruction or gangrene    COPD exacerbation       Encounter Diagnoses   Name Primary?    Chronic obstructive pulmonary disease with acute exacerbation Yes    Acute respiratory failure with hypoxia     Personal history of nicotine dependence       PLAN  -PFT  -Alpha-1 antitrypsin  -Incentive spirometer with encouragement to use up to 10  times a day  -Continue with Breztri 2 puffs twice daily rinsing out her mouth after each use which is prescribed by her PCP  -Continue with albuterol inhaler as needed for shortness of air or wheeze refill sent to her pharmacy today  -6-minute walk in office with lowest saturation of 90%, will send order to D/C oxygen at this time  -Low-dose lung cancer screening ordered  -Follow-up in 6 to 8 weeks to review results from low-dose CT, PFT alpha-1  -Patient provided with [place] and me contact information for financial assistance with Breztri    Diagnoses and all orders for this visit:    1. Chronic obstructive pulmonary disease with acute exacerbation (Primary)  -     Complete PFT - Pre & Post Bronchodilator; Future  -     Alpha - 1 - Antitrypsin; Future  -     albuterol sulfate  (90 Base) MCG/ACT inhaler; Inhale 2 puffs Every 4 (Four) Hours As Needed for Wheezing or Shortness of Air.  Dispense: 18 g; Refill: 5  -     6 Minute Walk Test; Future    2. Acute respiratory failure with hypoxia  -     Complete PFT - Pre & Post Bronchodilator; Future  -     Alpha - 1 - Antitrypsin; Future  -     albuterol sulfate  (90 Base) MCG/ACT inhaler; Inhale 2 puffs Every 4 (Four) Hours As Needed for Wheezing or Shortness of Air.  Dispense: 18 g; Refill: 5  -     6 Minute Walk Test; Future    3. Personal history of nicotine dependence  -      CT Chest Low Dose Cancer Screening WO; Future           Smoking status: Former  Vaccination status: Reviewed  Medications personally reviewed    Follow Up  Return in about 2 months (around 3/8/2024).    Patient was given instructions and counseling regarding her condition or for health maintenance advice. Please see specific information pulled into the AVS if appropriate.

## 2024-01-08 ENCOUNTER — TELEPHONE (OUTPATIENT)
Dept: PULMONOLOGY | Facility: CLINIC | Age: 69
End: 2024-01-08

## 2024-01-08 ENCOUNTER — OFFICE VISIT (OUTPATIENT)
Dept: PULMONOLOGY | Facility: CLINIC | Age: 69
End: 2024-01-08
Payer: MEDICARE

## 2024-01-08 VITALS
HEIGHT: 63 IN | WEIGHT: 182.6 LBS | OXYGEN SATURATION: 96 % | DIASTOLIC BLOOD PRESSURE: 48 MMHG | TEMPERATURE: 97.3 F | SYSTOLIC BLOOD PRESSURE: 111 MMHG | RESPIRATION RATE: 18 BRPM | BODY MASS INDEX: 32.36 KG/M2 | HEART RATE: 94 BPM

## 2024-01-08 DIAGNOSIS — Z87.891 PERSONAL HISTORY OF NICOTINE DEPENDENCE: ICD-10-CM

## 2024-01-08 DIAGNOSIS — J44.1 CHRONIC OBSTRUCTIVE PULMONARY DISEASE WITH ACUTE EXACERBATION: Primary | ICD-10-CM

## 2024-01-08 DIAGNOSIS — J96.01 ACUTE RESPIRATORY FAILURE WITH HYPOXIA: ICD-10-CM

## 2024-01-08 PROCEDURE — 1159F MED LIST DOCD IN RCRD: CPT | Performed by: NURSE PRACTITIONER

## 2024-01-08 PROCEDURE — 1160F RVW MEDS BY RX/DR IN RCRD: CPT | Performed by: NURSE PRACTITIONER

## 2024-01-08 PROCEDURE — 99204 OFFICE O/P NEW MOD 45 MIN: CPT | Performed by: NURSE PRACTITIONER

## 2024-01-08 RX ORDER — ALBUTEROL SULFATE 90 UG/1
2 AEROSOL, METERED RESPIRATORY (INHALATION) EVERY 4 HOURS PRN
Qty: 18 G | Refills: 5 | Status: SHIPPED | OUTPATIENT
Start: 2024-01-08

## 2024-01-08 NOTE — TELEPHONE ENCOUNTER
Per Maria Elena:  Please in order to discontinue oxygen to [facility]ero care for Ms. Forrest as she passed her 6-minute walk in office today with lowest saturation at 90%.     [facility]

## 2024-01-11 ENCOUNTER — OFFICE VISIT (OUTPATIENT)
Dept: FAMILY MEDICINE CLINIC | Facility: CLINIC | Age: 69
End: 2024-01-11
Payer: MEDICARE

## 2024-01-11 VITALS
DIASTOLIC BLOOD PRESSURE: 66 MMHG | WEIGHT: 183 LBS | HEART RATE: 91 BPM | SYSTOLIC BLOOD PRESSURE: 122 MMHG | BODY MASS INDEX: 32.43 KG/M2 | OXYGEN SATURATION: 97 % | HEIGHT: 63 IN

## 2024-01-11 DIAGNOSIS — J44.9 CHRONIC OBSTRUCTIVE PULMONARY DISEASE, UNSPECIFIED COPD TYPE: Primary | ICD-10-CM

## 2024-01-11 DIAGNOSIS — Z23 NEED FOR PNEUMOCOCCAL VACCINATION: ICD-10-CM

## 2024-01-11 NOTE — PROGRESS NOTES
Transitional Care Follow Up Visit  Subjective     Selene Forrest is a 68 y.o. female who presents for a transitional care management visit.    Within 48 business hours after discharge our office contacted her via telephone to coordinate her care and needs.      I reviewed and discussed the details of that call along with the discharge summary, hospital problems, inpatient lab results, inpatient diagnostic studies, and consultation reports with Selene.     Current outpatient and discharge medications have been reconciled for the patient.  Reviewed by: KIM Curiel          1/1/2024     3:01 PM   Date of TCM Phone Call   [facility]   Date of Admission 12/27/2023   Date of Discharge 1/1/2024   Discharge Disposition Home or Self Care     Risk for Readmission (LACE) Score: 9 (1/1/2024  6:00 AM)      History of Present Illness  Pt is a TCM visit for Acute COPD exacerbation and chronic bronchitis. Pt was admitted to [facility] from 12/27/23-1/1/2024. Pt states she is feeling much better. No issues or concerns at this time.     Pt has a f/u with pulmonary on 3/8/24    Pt is currently on albuterol and Breztri inhalers.     Pt is due a pneumonia vaccine and would like to receive today.     Course During Hospital Stay:  Hospital records reviewed     The following portions of the patient's history were reviewed and updated as appropriate: allergies, current medications, past family history, past medical history, past social history, past surgical history, and problem list.          Objective   Physical Exam  Constitutional:       General: She is not in acute distress.     Appearance: Normal appearance. She is not ill-appearing.   HENT:      Head: Normocephalic and atraumatic.      Right Ear: Tympanic membrane, ear canal and external ear normal.      Left Ear: Tympanic membrane, ear canal and external ear normal.      Nose: Nose normal.   Cardiovascular:      Rate and Rhythm: Normal rate and regular rhythm.       Heart sounds: Normal heart sounds. No murmur heard.  Pulmonary:      Effort: Pulmonary effort is normal. No respiratory distress.      Breath sounds: Normal breath sounds.   Chest:      Chest wall: No tenderness.   Musculoskeletal:         General: No swelling or tenderness. Normal range of motion.      Cervical back: Normal range of motion and neck supple.      Comments: Pt ambulates with a rolling walker   Skin:     General: Skin is warm and dry.      Findings: No rash.   Neurological:      General: No focal deficit present.      Mental Status: She is alert and oriented to person, place, and time. Mental status is at baseline.      Gait: Gait normal.   Psychiatric:         Mood and Affect: Mood normal.         Behavior: Behavior normal.         Thought Content: Thought content normal.         Judgment: Judgment normal.         Assessment & Plan   Diagnoses and all orders for this visit:    1. Chronic obstructive pulmonary disease, unspecified COPD type (Primary)  Comments:  pt to f/u with pulmonolgy    2. Need for pneumococcal vaccination  -     Pneumococcal Conjugate Vaccine 20-Valent (PCV20)

## 2024-02-26 ENCOUNTER — HOSPITAL ENCOUNTER (OUTPATIENT)
Dept: RESPIRATORY THERAPY | Facility: HOSPITAL | Age: 69
Discharge: HOME | End: 2024-02-26
Payer: MEDICARE

## 2024-02-26 ENCOUNTER — HOSPITAL ENCOUNTER (OUTPATIENT)
Dept: CT IMAGING | Facility: HOSPITAL | Age: 69
Discharge: HOME | End: 2024-02-26
Payer: MEDICARE

## 2024-02-26 DIAGNOSIS — J96.01 ACUTE RESPIRATORY FAILURE WITH HYPOXIA: ICD-10-CM

## 2024-02-26 DIAGNOSIS — J44.1 CHRONIC OBSTRUCTIVE PULMONARY DISEASE WITH ACUTE EXACERBATION: ICD-10-CM

## 2024-02-26 DIAGNOSIS — Z87.891 PERSONAL HISTORY OF NICOTINE DEPENDENCE: Primary | ICD-10-CM

## 2024-02-26 DIAGNOSIS — Z87.891 PERSONAL HISTORY OF NICOTINE DEPENDENCE: ICD-10-CM

## 2024-02-26 PROCEDURE — 94729 DIFFUSING CAPACITY: CPT

## 2024-02-26 PROCEDURE — 94060 EVALUATION OF WHEEZING: CPT

## 2024-02-26 PROCEDURE — 94726 PLETHYSMOGRAPHY LUNG VOLUMES: CPT

## 2024-02-26 PROCEDURE — 71271 CT THORAX LUNG CANCER SCR C-: CPT

## 2024-02-26 RX ORDER — ALBUTEROL SULFATE 2.5 MG/3ML
2.5 SOLUTION RESPIRATORY (INHALATION) ONCE
Status: COMPLETED | OUTPATIENT
Start: 2024-02-26 | End: 2024-02-26

## 2024-02-26 RX ADMIN — ALBUTEROL SULFATE 2.5 MG: 2.5 SOLUTION RESPIRATORY (INHALATION) at 13:59

## 2024-02-29 NOTE — PROGRESS NOTES
Primary Care Provider  Gwen Chavez APRN   Referring Provider  No ref. provider found    Patient Complaint  COPD      SUBJECTIVE    History of Presenting Illness  Selene Forrest is a pleasant 68 y.o. female who presents to [facility] for followup appointment. I last saw the patient 1/8/2024 in the COPD clinic for hospital followup.  At last visit patient did a 6-minute walk successfully with no saturations less than 90% an order was sent to discontinue her oxygen.  In addition patient was scheduled for low-dose lung cancer screening.  Her low-dose lung cancer screening was 2/26/2024 which showed no evidence of lung cancer no acute cardiopulmonary process, but it does show moderate emphysema.  Patient's pulmonary function test showed very severe obstructive airway disease likely emphysema.  Alpha-1 antitrypsin lab is normal with a value of 128 and a genotype of MM.  Patient states she still has some shortness of air with minimal exertional activities but she does not want to get oxygen back at this time.  She continues on Breztri and albuterol inhaler.    She denies coughing, wheezing, headaches, chest pain, weight loss or hemoptysis. Denies fevers, chills and night sweats. Selene Forrest is able to perform ADLs without difficulties and denies any swollen glands/lymph nodes in the head or neck.    I have personally reviewed the review of systems, past family, social, medical and surgical histories; and agree with their findings.    Review of Systems  Constitutional symptoms:  Denied complaints   Ear, nose, throat: Denied complaints  Cardiovascular:  Denied complaints  Respiratory: Shortness of air with exertion  Gastrointestinal: Denied complaints  Musculoskeletal: Denied complaints    Family History   Problem Relation Age of Onset    Stroke Mother     Hypertension Mother     Breast cancer Mother     Heart disease Father     Heart disease Brother      Stroke Maternal Grandfather     Heart disease Paternal Grandmother     Heart disease Paternal Grandfather     Breast cancer Other     Breast cancer Other     Diabetes Other         Social History     Socioeconomic History    Marital status: Single   Tobacco Use    Smoking status: Former     Current packs/day: 0.00     Average packs/day: 2.0 packs/day for 22.0 years (44.0 ttl pk-yrs)     Types: Cigarettes     Start date:      Quit date: 2000     Years since quittin.2     Passive exposure: Current    Smokeless tobacco: Never    Tobacco comments:     started at 21   Vaping Use    Vaping status: Never Used   Substance and Sexual Activity    Alcohol use: Yes     Comment: OCC    Drug use: Never    Sexual activity: Defer        Past Medical History:   Diagnosis Date    Abdominal hernia     COPD (chronic obstructive pulmonary disease)     SOB AT TIMES, HX SMOKER PER PT- NO INHALERS- DENIED ANY SOB AT THIS TIME    Depression     Disease of thyroid gland     Hx of bone density study 2019    Ventral hernia     Visit for screening mammogram 2019        Immunization History   Administered Date(s) Administered    Fluzone Quad >6mos (Multi-dose) 2019    Pneumococcal Conjugate 20-Valent (PCV20) 2024       No Known Allergies       Current Outpatient Medications:     albuterol sulfate  (90 Base) MCG/ACT inhaler, Inhale 2 puffs Every 4 (Four) Hours As Needed for Wheezing or Shortness of Air., Disp: 18 g, Rfl: 5    Budeson-Glycopyrrol-Formoterol (Breztri Aerosphere) 160-9-4.8 MCG/ACT aerosol inhaler, Inhale 2 puffs 2 (Two) Times a Day., Disp: 3 each, Rfl: 2    FLUoxetine (PROzac) 20 MG capsule, TAKE 1 CAPSULE EVERY DAY (Patient taking differently: Take 2 capsules by mouth Daily.), Disp: 90 capsule, Rfl: 1    levothyroxine (SYNTHROID, LEVOTHROID) 25 MCG tablet, TAKE 1 TABLET EVERY DAY (Patient taking differently: Take 1 tablet by mouth Every Morning.), Disp: 90 tablet, Rfl: 1           Vital Signs  "  /80 (BP Location: Right arm, Patient Position: Sitting)   Pulse 78   Temp 98 °F (36.7 °C)   Resp 18   Ht 160 cm (63\")   Wt 81.6 kg (180 lb)   SpO2 94% Comment: room air  BMI 31.89 kg/m²       OBJECTIVE    Physical Exam  Vital Signs Reviewed   WDWN, Alert, NAD.    HEENT:  PERRL, EOMI.  OP, nares clear  Neck:  Supple, no JVD, no thyromegaly  Chest:  good aeration, clear to auscultation bilaterally, tympanic to percussion bilaterally, no work of breathing noted  CV: RRR, no MGR, pulses 2+, equal.  Abd:  Soft, NT, ND, + BS, no HSM  EXT:  no clubbing, no cyanosis, no edema  Neuro:  A&Ox3, CN grossly intact, no focal deficits.  Skin: No rashes or lesions noted    Results Review  I have personally reviewed the prior office notes, hospital records, labs, and diagnostics.  CT Chest Low Dose Cancer Screening WO [[id number]] (Order [id number])  Order  Status: Final result     Appointment Information    Display Notes    Pre reg formerly [facility]                  PACS Images     Radiology Images    Study Result    Narrative & Impression   PROCEDURE:  CT CHEST LOW DOSE CANCER SCREENING WO     COMPARISON: [facility], CT, CT LOW DOSE CHEST SCREENING, 2019, 8:58.     REASON FOR SCREENING:     Patient is 68 years of age, asymptomatic, and has a smoking history of more   than 30 pack years.  SMOKING STATUS:      Former smoker.  Years since quittin                 SCREENING VISIT:       Baseline.                   TECHNIQUE:    Axial unenhanced LDCT images from the apices through mid-kidney were obtained.   Evaluation of solid organs and vascular structures is suboptimal due to the lack of IV contrast.   Imaging was performed on a Siemens Definition AS CT scanner.     RECONSTRUCTED IMAGE WIDTH: 2 mm.  RADIATION:      CT Dose Index Vol (CTDIvol):      1.90  mGy               Dose Length Product (DLP):        62  mGy-cm        FINDINGS:          LUNG NODULES:          A couple benign calcified granulomas are " noted.  No concerning pulmonary nodule is   identified.  LUNGS:               COPD:  Moderate emphysema.  FIBROSIS:         None  LYMPH NODES:            None.    OTHER FINDINGS:        None.       RIGHT PLEURAL SPACE:            EFFUSION:       None.  CALCIFICATION:           None.  THICKENING:   None.  PNEUMOTHORAX:        None.     LEFT PLEURAL SPACE:              EFFUSION:       None.  CALCIFICATION:           None.  THICKENING:   None.  PNEUMOTHORAX:        None.     HEART:               SIZE:    Normal.  CORONARY CALC:       Moderate.  PERICARDIAL EFFUSION:        None.  OTHER FINDINGS:        None.       UPPER ABDOMEN:       Bilateral adrenal adenomas appear stable.    THORAX:          Bilateral breast implants are present.    BASE OF NECK:            None.       IMPRESSION:                 1. No evidence of lung cancer.  2. No acute cardiopulmonary process.  3. Moderate emphysema.     LUNG RADS:                           1 (negative, less than 1% chance of malignancy)            LIVIER BUENO MD         Electronically Signed and Approved By: LIVIER BUENO MD on 2/26/2024 at 14:58      Results  Complete PFT - Pre & Post Bronchodilator (Order [id number])  Order-Level Documents:    Scan on 2/26/2024 1638 by Maria Elena Mcdowell, APRN: PULMONARY FUNCTION TEST, [facility], 02/26/2024         Author: -- Service: -- Author Type: --   Filed: Date of Service: Creation Time:   Status: (Other)   PFT interpretation     Spirometry shows very severe obstructive defect.  FEV1/FVC is 40.   FEV1 is 0.6 liters, 27.  Percent of predicted.  FVC is 1.50 liters, 53 percent of predicted.     There is a significant response to bronchodilator administration.  FEV1 increased from 0.60 to 0.71 L, 17% change.  FVC increased from 1.53 L to 1.71 L, 14% change.     Lung volumes:  Lung volumes show hyperinflation and air trapping.   Total lung capacity is 5.89 liters, 121 percent of predicted.  Residual volume is 4.39 liters, 241 percent of  predicted.        Diffusion capacity is 6.05 mL/min/mmHg, 32 percent of predicted.      Flow volume loop is compatible with obstructive process.     Comparision:  No previous study for comparison.     Conclusion:  Low FEV1/FVC with low FEV1 and FVC with hyperinflation, air trapping and low diffusion capacity.  Suggests very severe obstructive airway disease, likely emphysema.  Please correlate clinically.               File Link    Scan on 2/26/2024 1638 by Maria Elena Mcdowell APRN: PULMONARY FUNCTION TEST, [facility], 02/26/2024        Key Information    Document ID File Type Document Type Description   [id number] Image PULMONARY FUNCTION TEST - SCAN PULMONARY FUNCTION TEST, [facility], 02/26/2024     Import Information    Attached At Date Time User Dept   Order Level 2/26/2024  4:38 PM Maria Elena Mcdowell APRN      Order    Pulmonary Function Test [[id number]]     Encounter    Hospital Encounter on 2/26/24 with [facility] PUL LAB ROOM 1     ASSESSMENT         Patient Active Problem List   Diagnosis    Abnormal bone density screening    Depression    Incisional hernia, without obstruction or gangrene    COPD exacerbation       Encounter Diagnoses   Name Primary?    Pulmonary emphysema, unspecified emphysema type Yes    Personal history of nicotine dependence       PLAN  -Incentive spirometer with encouragement to use up to 10 times a day  -Continue with Breztri 2 puffs twice daily rinsing out her mouth after each use which is prescribed by her PCP  -Continue with albuterol inhaler as needed for shortness of air or wheeze refill sent to her pharmacy today  -annual LDCT has been ordered  Diagnoses and all orders for this visit:    1. Pulmonary emphysema, unspecified emphysema type (Primary)    2. Personal history of nicotine dependence           Smoking status:former  Vaccination status:reviewed  Medications personally reviewed    Follow Up  Return in about 6 months (around 9/8/2024) for with MD .    Patient was given instructions and  counseling regarding her condition or for health maintenance advice. Please see specific information pulled into the AVS if appropriate.

## 2024-03-08 ENCOUNTER — OFFICE VISIT (OUTPATIENT)
Dept: PULMONOLOGY | Facility: CLINIC | Age: 69
End: 2024-03-08
Payer: MEDICARE

## 2024-03-08 VITALS
SYSTOLIC BLOOD PRESSURE: 138 MMHG | OXYGEN SATURATION: 94 % | HEART RATE: 78 BPM | BODY MASS INDEX: 31.89 KG/M2 | DIASTOLIC BLOOD PRESSURE: 80 MMHG | TEMPERATURE: 98 F | HEIGHT: 63 IN | RESPIRATION RATE: 18 BRPM | WEIGHT: 180 LBS

## 2024-03-08 DIAGNOSIS — Z87.891 PERSONAL HISTORY OF NICOTINE DEPENDENCE: ICD-10-CM

## 2024-03-08 DIAGNOSIS — J43.9 PULMONARY EMPHYSEMA, UNSPECIFIED EMPHYSEMA TYPE: Primary | ICD-10-CM

## 2024-03-08 PROCEDURE — 1160F RVW MEDS BY RX/DR IN RCRD: CPT | Performed by: NURSE PRACTITIONER

## 2024-03-08 PROCEDURE — 99214 OFFICE O/P EST MOD 30 MIN: CPT | Performed by: NURSE PRACTITIONER

## 2024-03-08 PROCEDURE — 1159F MED LIST DOCD IN RCRD: CPT | Performed by: NURSE PRACTITIONER

## 2024-03-25 ENCOUNTER — OFFICE VISIT (OUTPATIENT)
Dept: FAMILY MEDICINE CLINIC | Facility: CLINIC | Age: 69
End: 2024-03-25
Payer: MEDICARE

## 2024-03-25 VITALS
WEIGHT: 188 LBS | OXYGEN SATURATION: 94 % | HEART RATE: 84 BPM | DIASTOLIC BLOOD PRESSURE: 55 MMHG | BODY MASS INDEX: 33.31 KG/M2 | HEIGHT: 63 IN | SYSTOLIC BLOOD PRESSURE: 121 MMHG

## 2024-03-25 DIAGNOSIS — Z13.6 ENCOUNTER FOR LIPID SCREENING FOR CARDIOVASCULAR DISEASE: ICD-10-CM

## 2024-03-25 DIAGNOSIS — F32.A DEPRESSION, UNSPECIFIED DEPRESSION TYPE: Primary | ICD-10-CM

## 2024-03-25 DIAGNOSIS — J44.9 CHRONIC OBSTRUCTIVE PULMONARY DISEASE, UNSPECIFIED COPD TYPE: ICD-10-CM

## 2024-03-25 DIAGNOSIS — Z13.220 ENCOUNTER FOR LIPID SCREENING FOR CARDIOVASCULAR DISEASE: ICD-10-CM

## 2024-03-25 DIAGNOSIS — E03.9 HYPOTHYROIDISM, UNSPECIFIED TYPE: ICD-10-CM

## 2024-03-25 PROCEDURE — 99214 OFFICE O/P EST MOD 30 MIN: CPT | Performed by: NURSE PRACTITIONER

## 2024-03-25 NOTE — PROGRESS NOTES
Chief Complaint  Hypothyroidism and Depression, COPD    Subjective            Selene Forrest presents to [facility]  History of Present Illness  PT is a f/u for depression, COPD and hypothyroidism. No issues or concerns at this time.     Pt is due labs/orders placed.    PT is followed by Pulmonology for COPD.    MAW scheduled 3/26/24    Pt is due RSV vaccine. Pt declines and understands the risks of not having.         Past Medical History:   Diagnosis Date    Abdominal hernia     COPD (chronic obstructive pulmonary disease)     SOB AT TIMES, HX SMOKER PER PT- NO INHALERS- DENIED ANY SOB AT THIS TIME    Depression     Disease of thyroid gland     Hx of bone density study 2019    Ventral hernia     Visit for screening mammogram 2019       No Known Allergies     Past Surgical History:   Procedure Laterality Date    BREAST AUGMENTATION Bilateral     BREAST LUMPECTOMY      DILATATION AND CURETTAGE      HERNIA REPAIR      NOSE SURGERY      TUBAL ABDOMINAL LIGATION      VENTRAL HERNIA REPAIR N/A 2023    Procedure: VENTRAL / INCISIONAL HERNIA REPAIR LAPAROSCOPIC WITH JAMR LabsI ROBOT;  Surgeon: Dangelo Rios MD;  [place] [facility] [place] [facility] [place]  Service: Robotics - DaVinci;  Laterality: N/A;        Social History     Tobacco Use    Smoking status: Former     Current packs/day: 0.00     Average packs/day: 2.0 packs/day for 22.0 years (44.0 ttl pk-yrs)     Types: Cigarettes     Start date:      Quit date: 2000     Years since quittin.2     Passive exposure: Current    Smokeless tobacco: Never    Tobacco comments:     started at 21   Substance Use Topics    Alcohol use: Yes     Comment: OCC       Family History   Problem Relation Age of Onset    Stroke Mother     Hypertension Mother     Breast cancer Mother     Heart disease Father     Heart disease Brother     Stroke Maternal Grandfather     Heart disease Paternal Grandmother     Heart disease Paternal  "Grandfather     Breast cancer Other     Breast cancer Other     Diabetes Other         Current Outpatient Medications on File Prior to Visit   Medication Sig    albuterol sulfate  (90 Base) MCG/ACT inhaler Inhale 2 puffs Every 4 (Four) Hours As Needed for Wheezing or Shortness of Air.    Budeson-Glycopyrrol-Formoterol (Breztri Aerosphere) 160-9-4.8 MCG/ACT aerosol inhaler Inhale 2 puffs 2 (Two) Times a Day.    FLUoxetine (PROzac) 20 MG capsule TAKE 1 CAPSULE EVERY DAY (Patient taking differently: Take 2 capsules by mouth Daily.)    levothyroxine (SYNTHROID, LEVOTHROID) 25 MCG tablet TAKE 1 TABLET EVERY DAY (Patient taking differently: Take 1 tablet by mouth Every Morning.)     No current facility-administered medications on file prior to visit.       Health Maintenance Due   Topic Date Due    LIPID PANEL  11/04/2023    ANNUAL WELLNESS VISIT  03/23/2024    BMI FOLLOWUP  03/23/2024       Objective     /55   Pulse 84   Ht 160 cm (63\")   Wt 85.3 kg (188 lb)   SpO2 94%   BMI 33.30 kg/m²       Physical Exam  Constitutional:       General: She is not in acute distress.     Appearance: Normal appearance. She is not ill-appearing.   HENT:      Head: Normocephalic and atraumatic.   Cardiovascular:      Rate and Rhythm: Normal rate and regular rhythm.      Heart sounds: Normal heart sounds. No murmur heard.  Pulmonary:      Effort: Pulmonary effort is normal. No respiratory distress.      Breath sounds: Normal breath sounds.   Chest:      Chest wall: No tenderness.   Musculoskeletal:         General: No swelling or tenderness. Normal range of motion.      Cervical back: Normal range of motion and neck supple.      Comments: Pt uses rolling walker to ambulate   Skin:     General: Skin is warm and dry.      Findings: No rash.   Neurological:      General: No focal deficit present.      Mental Status: She is alert and oriented to person, place, and time. Mental status is at baseline.      Gait: Gait normal. "   Psychiatric:         Attention and Perception: Attention normal.         Mood and Affect: Mood and affect normal.         Speech: Speech normal.         Behavior: Behavior normal. Behavior is cooperative.         Thought Content: Thought content normal. Thought content does not include suicidal ideation.         Judgment: Judgment normal.     BMI is >= 30 and <35. (Class 1 Obesity). The following options were offered after discussion;: exercise counseling/recommendations and nutrition counseling/recommendations       Result Review :                           Assessment and Plan        Diagnoses and all orders for this visit:    1. Depression, unspecified depression type (Primary)  Comments:  stable on Prozac 40mg daily, continue    2. Hypothyroidism, unspecified type  Comments:  stable on synthroid 25mcg, continue  Orders:  -     TSH; Future    3. Encounter for lipid screening for cardiovascular disease  -     Comprehensive metabolic panel; Future  -     Lipid panel; Future    4. Chronic obstructive pulmonary disease, unspecified COPD type  Comments:  stable on Breztri and Albuterl prn, continue, continue f/u with Pulmonolgy for mgmt              Follow Up     Return in about 6 months (around 9/25/2024).    Patient was given instructions and counseling regarding her condition or for health maintenance advice. Please see specific information pulled into the AVS if appropriate.     Selene Forrest  reports that she quit smoking about 24 years ago. Her smoking use included cigarettes. She started smoking about 46 years ago. She has a 44 pack-year smoking history. She has been exposed to tobacco smoke. She has never used smokeless tobacco.

## 2024-03-26 ENCOUNTER — OFFICE VISIT (OUTPATIENT)
Dept: FAMILY MEDICINE CLINIC | Facility: CLINIC | Age: 69
End: 2024-03-26
Payer: MEDICARE

## 2024-03-26 VITALS
DIASTOLIC BLOOD PRESSURE: 49 MMHG | SYSTOLIC BLOOD PRESSURE: 119 MMHG | OXYGEN SATURATION: 94 % | HEIGHT: 63 IN | BODY MASS INDEX: 33.31 KG/M2 | WEIGHT: 188 LBS | HEART RATE: 76 BPM

## 2024-03-26 DIAGNOSIS — Z00.00 MEDICARE ANNUAL WELLNESS VISIT, SUBSEQUENT: Primary | ICD-10-CM

## 2024-03-26 PROCEDURE — 1160F RVW MEDS BY RX/DR IN RCRD: CPT | Performed by: NURSE PRACTITIONER

## 2024-03-26 PROCEDURE — 1170F FXNL STATUS ASSESSED: CPT | Performed by: NURSE PRACTITIONER

## 2024-03-26 PROCEDURE — G0439 PPPS, SUBSEQ VISIT: HCPCS | Performed by: NURSE PRACTITIONER

## 2024-03-26 PROCEDURE — 1159F MED LIST DOCD IN RCRD: CPT | Performed by: NURSE PRACTITIONER

## 2024-03-26 NOTE — PROGRESS NOTES
The ABCs of the Annual Wellness Visit  Subsequent Medicare Wellness Visit    Subjective      Selene Forrest is a 68 y.o. female who presents for a Subsequent Medicare Wellness Visit.    The following portions of the patient's history were reviewed and   updated as appropriate: allergies, current medications, past family history, past medical history, past social history, past surgical history, and problem list.    Compared to one year ago, the patient feels her physical   health is the same.    Compared to one year ago, the patient feels her mental   health is the same.    Recent Hospitalizations:  This patient has had a [facility] admission record on file within the last 365 days.    Current Medical Providers:  Patient Care Team:  Gwen Chavez APRN as PCP - General (Nurse Practitioner)  Dangelo Rios MD as Consulting Physician (General Surgery)  Philip Nichols MD as Consulting Physician (Pulmonary Disease)    Outpatient Medications Prior to Visit   Medication Sig Dispense Refill    albuterol sulfate  (90 Base) MCG/ACT inhaler Inhale 2 puffs Every 4 (Four) Hours As Needed for Wheezing or Shortness of Air. 18 g 5    Budeson-Glycopyrrol-Formoterol (Breztri Aerosphere) 160-9-4.8 MCG/ACT aerosol inhaler Inhale 2 puffs 2 (Two) Times a Day. 3 each 2    FLUoxetine (PROzac) 20 MG capsule TAKE 1 CAPSULE EVERY DAY (Patient taking differently: Take 1 capsule by mouth Daily.) 90 capsule 1    levothyroxine (SYNTHROID, LEVOTHROID) 25 MCG tablet TAKE 1 TABLET EVERY DAY (Patient taking differently: Take 1 tablet by mouth Every Morning.) 90 tablet 1     No facility-administered medications prior to visit.       No opioid medication identified on active medication list. I have reviewed chart for other potential  high risk medication/s and harmful drug interactions in the elderly.        Aspirin is not on active medication list.  Aspirin use is not indicated based on review of current medical condition/s.  "Risk of harm outweighs potential benefits.  .    Patient Active Problem List   Diagnosis    Abnormal bone density screening    Depression    Incisional hernia, without obstruction or gangrene    COPD exacerbation     Advance Care Planning   Advance Care Planning     Advance Directive is not on file.  ACP discussion was held with the patient during this visit. Patient does not have an advance directive, declines further assistance.     Objective    Vitals:    24 1318   BP: 119/49   Pulse: 76   SpO2: 94%   Weight: 85.3 kg (188 lb)   Height: 160 cm (63\")     Estimated body mass index is 33.3 kg/m² as calculated from the following:    Height as of this encounter: 160 cm (63\").    Weight as of this encounter: 85.3 kg (188 lb).           Does the patient have evidence of cognitive impairment?   No            HEALTH RISK ASSESSMENT    Smoking Status:  Social History     Tobacco Use   Smoking Status Former    Current packs/day: 0.00    Average packs/day: 2.0 packs/day for 22.0 years (44.0 ttl pk-yrs)    Types: Cigarettes    Start date:     Quit date:     Years since quittin.2    Passive exposure: Current   Smokeless Tobacco Never   Tobacco Comments    started at 21     Alcohol Consumption:  Social History     Substance and Sexual Activity   Alcohol Use Yes    Comment: OCC     Fall Risk Screen:    STEADI Fall Risk Assessment was completed, and patient is at LOW risk for falls.Assessment completed on:3/26/2024    Depression Screening:      3/26/2024     1:00 PM   PHQ-2/PHQ-9 Depression Screening   Little Interest or Pleasure in Doing Things 0-->not at all   Feeling Down, Depressed or Hopeless 0-->not at all   PHQ-9: Brief Depression Severity Measure Score 0       Health Habits and Functional and Cognitive Screening:      3/26/2024     1:00 PM   Functional & Cognitive Status   Do you have difficulty preparing food and eating? No   Do you have difficulty bathing yourself, getting dressed or grooming yourself? " No   Do you have difficulty using the toilet? No   Do you have difficulty moving around from place to place? No   Do you have trouble with steps or getting out of a bed or a chair? No   Current Diet Well Balanced Diet   Dental Exam Up to date   Eye Exam Up to date   Exercise (times per week) 0 times per week   Current Exercises Include No Regular Exercise   Do you need help using the phone?  No   Are you deaf or do you have serious difficulty hearing?  No   Do you need help to go to places out of walking distance? No   Do you need help shopping? No   Do you need help preparing meals?  No   Do you need help with housework?  No   Do you need help with laundry? No   Do you need help taking your medications? No   Do you need help managing money? No   Do you ever drive or ride in a car without wearing a seat belt? No   Have you felt unusual stress, anger or loneliness in the last month? No   Who do you live with? Alone   If you need help, do you have trouble finding someone available to you? No   Have you been bothered in the last four weeks by sexual problems? No   Do you have difficulty concentrating, remembering or making decisions? No       Age-appropriate Screening Schedule:  Refer to the list below for future screening recommendations based on patient's age, sex and/or medical conditions. Orders for these recommended tests are listed in the plan section. The patient has been provided with a written plan.    Health Maintenance   Topic Date Due    ZOSTER VACCINE (1 of 2) Never done    LIPID PANEL  11/04/2023    COVID-19 Vaccine (1 - 2023-24 season) 03/29/2024 (Originally 9/1/2023)    INFLUENZA VACCINE  03/31/2024 (Originally 8/1/2023)    RSV Vaccine - Adults (1 - 1-dose 60+ series) 03/25/2025 (Originally 10/27/2016)    TDAP/TD VACCINES (1 - Tdap) 03/25/2025 (Originally 10/27/1975)    BMI FOLLOWUP  03/25/2025    ANNUAL WELLNESS VISIT  03/26/2025    MAMMOGRAM  08/07/2025    DXA SCAN  08/07/2025    COLORECTAL CANCER  SCREENING  03/16/2026    HEPATITIS C SCREENING  Completed    Pneumococcal Vaccine 65+  Completed                  CMS Preventative Services Quick Reference  Risk Factors Identified During Encounter:    None Identified    The above risks/problems have been discussed with the patient.  Pertinent information has been shared with the patient in the After Visit Summary.    Diagnoses and all orders for this visit:    1. Medicare annual wellness visit, subsequent (Primary)        Follow Up:   Next Medicare Wellness visit to be scheduled in 1 year.      An After Visit Summary and PPPS were made available to the patient.

## 2024-04-25 ENCOUNTER — TELEPHONE (OUTPATIENT)
Dept: FAMILY MEDICINE CLINIC | Facility: CLINIC | Age: 69
End: 2024-04-25
Payer: MEDICARE

## 2024-05-22 DIAGNOSIS — E03.9 HYPOTHYROIDISM, UNSPECIFIED TYPE: ICD-10-CM

## 2024-05-22 DIAGNOSIS — F32.A DEPRESSION, UNSPECIFIED DEPRESSION TYPE: ICD-10-CM

## 2024-05-22 RX ORDER — LEVOTHYROXINE SODIUM 0.03 MG/1
25 TABLET ORAL DAILY
Qty: 90 TABLET | Refills: 1 | Status: SHIPPED | OUTPATIENT
Start: 2024-05-22

## 2024-05-22 RX ORDER — FLUOXETINE HYDROCHLORIDE 20 MG/1
20 CAPSULE ORAL DAILY
Qty: 90 CAPSULE | Refills: 1 | Status: SHIPPED | OUTPATIENT
Start: 2024-05-22

## 2024-05-22 NOTE — TELEPHONE ENCOUNTER
Caller: Lon Selene Del Toroe    Relationship: Self    Best call back number:     [phone]     Requested Prescriptions:   Requested Prescriptions     Pending Prescriptions Disp Refills    FLUoxetine (PROzac) 20 MG capsule 90 capsule 1     Sig: Take 1 capsule by mouth Daily.    levothyroxine (SYNTHROID, LEVOTHROID) 25 MCG tablet 90 tablet 1     Sig: Take 1 tablet by mouth Daily.        Pharmacy where request should be sent: [id number] - [place], [place] - [address] AT [facility] [phone] [facility] [phone] FX     Last office visit with prescribing clinician: 3/26/2024   Last telemedicine visit with prescribing clinician: Visit date not found   Next office visit with prescribing clinician: 9/26/2024     Additional details provided by patient:     Does the patient have less than a 3 day supply:  [] Yes  [] No    Would you like a call back once the refill request has been completed: [] Yes [] No    If the office needs to give you a call back, can they leave a voicemail: [] Yes [] No    Mateusz Mosher Rep   05/22/24 11:03 EDT

## 2024-05-28 DIAGNOSIS — E03.9 HYPOTHYROIDISM, UNSPECIFIED TYPE: ICD-10-CM

## 2024-05-28 RX ORDER — LEVOTHYROXINE SODIUM 0.03 MG/1
25 TABLET ORAL DAILY
Qty: 30 TABLET | OUTPATIENT
Start: 2024-05-28

## 2024-06-11 ENCOUNTER — TELEPHONE (OUTPATIENT)
Dept: FAMILY MEDICINE CLINIC | Facility: CLINIC | Age: 69
End: 2024-06-11
Payer: MEDICARE

## 2024-07-22 DIAGNOSIS — J44.1 CHRONIC OBSTRUCTIVE PULMONARY DISEASE WITH ACUTE EXACERBATION: ICD-10-CM

## 2024-07-22 DIAGNOSIS — J96.01 ACUTE RESPIRATORY FAILURE WITH HYPOXIA: ICD-10-CM

## 2024-07-22 RX ORDER — ALBUTEROL SULFATE 90 UG/1
2 AEROSOL, METERED RESPIRATORY (INHALATION) EVERY 4 HOURS PRN
Qty: 18 G | Refills: 5 | Status: SHIPPED | OUTPATIENT
Start: 2024-07-22

## 2024-09-23 ENCOUNTER — OFFICE VISIT (OUTPATIENT)
Dept: PULMONOLOGY | Facility: CLINIC | Age: 69
End: 2024-09-23
Payer: MEDICARE

## 2024-09-23 VITALS
WEIGHT: 179.9 LBS | RESPIRATION RATE: 16 BRPM | BODY MASS INDEX: 31.88 KG/M2 | DIASTOLIC BLOOD PRESSURE: 56 MMHG | HEIGHT: 63 IN | SYSTOLIC BLOOD PRESSURE: 137 MMHG | TEMPERATURE: 97.4 F | HEART RATE: 89 BPM | OXYGEN SATURATION: 94 %

## 2024-09-23 DIAGNOSIS — F17.210 SMOKING GREATER THAN 40 PACK YEARS: ICD-10-CM

## 2024-09-23 DIAGNOSIS — J42 CHRONIC BRONCHITIS, UNSPECIFIED CHRONIC BRONCHITIS TYPE: ICD-10-CM

## 2024-09-23 DIAGNOSIS — J96.11 CHRONIC RESPIRATORY FAILURE WITH HYPOXIA: ICD-10-CM

## 2024-09-23 DIAGNOSIS — J44.1 COPD EXACERBATION: Primary | ICD-10-CM

## 2024-09-23 DIAGNOSIS — Z23 ENCOUNTER FOR IMMUNIZATION: ICD-10-CM

## 2024-09-23 PROCEDURE — 99214 OFFICE O/P EST MOD 30 MIN: CPT | Performed by: INTERNAL MEDICINE

## 2024-09-23 PROCEDURE — 90662 IIV NO PRSV INCREASED AG IM: CPT | Performed by: INTERNAL MEDICINE

## 2024-09-23 PROCEDURE — 1159F MED LIST DOCD IN RCRD: CPT | Performed by: INTERNAL MEDICINE

## 2024-09-23 PROCEDURE — G0008 ADMIN INFLUENZA VIRUS VAC: HCPCS | Performed by: INTERNAL MEDICINE

## 2024-09-23 PROCEDURE — 1160F RVW MEDS BY RX/DR IN RCRD: CPT | Performed by: INTERNAL MEDICINE

## 2024-09-23 RX ORDER — BUDESONIDE 0.5 MG/2ML
0.5 INHALANT ORAL
Start: 2024-09-23

## 2024-09-23 RX ORDER — ARFORMOTEROL TARTRATE 15 UG/2ML
15 SOLUTION RESPIRATORY (INHALATION)
Start: 2024-09-23

## 2024-09-26 ENCOUNTER — OFFICE VISIT (OUTPATIENT)
Dept: FAMILY MEDICINE CLINIC | Facility: CLINIC | Age: 69
End: 2024-09-26
Payer: MEDICARE

## 2024-09-26 VITALS
HEIGHT: 63 IN | BODY MASS INDEX: 31.71 KG/M2 | OXYGEN SATURATION: 92 % | HEART RATE: 90 BPM | SYSTOLIC BLOOD PRESSURE: 148 MMHG | DIASTOLIC BLOOD PRESSURE: 56 MMHG | WEIGHT: 179 LBS

## 2024-09-26 DIAGNOSIS — E03.9 HYPOTHYROIDISM, UNSPECIFIED TYPE: ICD-10-CM

## 2024-09-26 DIAGNOSIS — Z12.31 ENCOUNTER FOR SCREENING MAMMOGRAM FOR MALIGNANT NEOPLASM OF BREAST: ICD-10-CM

## 2024-09-26 DIAGNOSIS — F32.A DEPRESSION, UNSPECIFIED DEPRESSION TYPE: Primary | ICD-10-CM

## 2024-09-26 DIAGNOSIS — J44.9 CHRONIC OBSTRUCTIVE PULMONARY DISEASE, UNSPECIFIED COPD TYPE: ICD-10-CM

## 2024-09-26 PROCEDURE — 1126F AMNT PAIN NOTED NONE PRSNT: CPT | Performed by: NURSE PRACTITIONER

## 2024-09-26 PROCEDURE — 1159F MED LIST DOCD IN RCRD: CPT | Performed by: NURSE PRACTITIONER

## 2024-09-26 PROCEDURE — 99214 OFFICE O/P EST MOD 30 MIN: CPT | Performed by: NURSE PRACTITIONER

## 2024-09-26 PROCEDURE — 1160F RVW MEDS BY RX/DR IN RCRD: CPT | Performed by: NURSE PRACTITIONER

## 2024-10-18 ENCOUNTER — LAB (OUTPATIENT)
Dept: LAB | Facility: HOSPITAL | Age: 69
End: 2024-10-18
Payer: MEDICARE

## 2024-10-18 DIAGNOSIS — E03.9 HYPOTHYROIDISM, UNSPECIFIED TYPE: ICD-10-CM

## 2024-10-18 DIAGNOSIS — J44.9 CHRONIC OBSTRUCTIVE PULMONARY DISEASE, UNSPECIFIED COPD TYPE: ICD-10-CM

## 2024-10-18 DIAGNOSIS — Z13.220 ENCOUNTER FOR LIPID SCREENING FOR CARDIOVASCULAR DISEASE: ICD-10-CM

## 2024-10-18 DIAGNOSIS — Z13.6 ENCOUNTER FOR LIPID SCREENING FOR CARDIOVASCULAR DISEASE: ICD-10-CM

## 2024-10-18 LAB
ALBUMIN SERPL-MCNC: 3.9 G/DL (ref 3.5–5.2)
ALBUMIN/GLOB SERPL: 1.2 G/DL
ALP SERPL-CCNC: 94 U/L (ref 39–117)
ALT SERPL W P-5'-P-CCNC: 15 U/L (ref 1–33)
ANION GAP SERPL CALCULATED.3IONS-SCNC: 10.4 MMOL/L (ref 5–15)
AST SERPL-CCNC: 18 U/L (ref 1–32)
BASOPHILS # BLD AUTO: 0.04 10*3/MM3 (ref 0–0.2)
BASOPHILS NFR BLD AUTO: 0.5 % (ref 0–1.5)
BILIRUB SERPL-MCNC: 0.4 MG/DL (ref 0–1.2)
BUN SERPL-MCNC: 7 MG/DL (ref 8–23)
BUN/CREAT SERPL: 8.5 (ref 7–25)
CALCIUM SPEC-SCNC: 9.4 MG/DL (ref 8.6–10.5)
CHLORIDE SERPL-SCNC: 92 MMOL/L (ref 98–107)
CHOLEST SERPL-MCNC: 216 MG/DL (ref 0–200)
CO2 SERPL-SCNC: 28.6 MMOL/L (ref 22–29)
CREAT SERPL-MCNC: 0.82 MG/DL (ref 0.57–1)
DEPRECATED RDW RBC AUTO: 41.3 FL (ref 37–54)
EGFRCR SERPLBLD CKD-EPI 2021: 78.5 ML/MIN/1.73
EOSINOPHIL # BLD AUTO: 0.1 10*3/MM3 (ref 0–0.4)
EOSINOPHIL NFR BLD AUTO: 1.1 % (ref 0.3–6.2)
ERYTHROCYTE [DISTWIDTH] IN BLOOD BY AUTOMATED COUNT: 12.7 % (ref 12.3–15.4)
GLOBULIN UR ELPH-MCNC: 3.3 GM/DL
GLUCOSE SERPL-MCNC: 107 MG/DL (ref 65–99)
HCT VFR BLD AUTO: 45.7 % (ref 34–46.6)
HDLC SERPL-MCNC: 59 MG/DL (ref 40–60)
HGB BLD-MCNC: 16.1 G/DL (ref 12–15.9)
IMM GRANULOCYTES # BLD AUTO: 0.03 10*3/MM3 (ref 0–0.05)
IMM GRANULOCYTES NFR BLD AUTO: 0.3 % (ref 0–0.5)
LDLC SERPL CALC-MCNC: 141 MG/DL (ref 0–100)
LDLC/HDLC SERPL: 2.36 {RATIO}
LYMPHOCYTES # BLD AUTO: 2.79 10*3/MM3 (ref 0.7–3.1)
LYMPHOCYTES NFR BLD AUTO: 32 % (ref 19.6–45.3)
MCH RBC QN AUTO: 32.2 PG (ref 26.6–33)
MCHC RBC AUTO-ENTMCNC: 35.2 G/DL (ref 31.5–35.7)
MCV RBC AUTO: 91.4 FL (ref 79–97)
MONOCYTES # BLD AUTO: 0.68 10*3/MM3 (ref 0.1–0.9)
MONOCYTES NFR BLD AUTO: 7.8 % (ref 5–12)
NEUTROPHILS NFR BLD AUTO: 5.09 10*3/MM3 (ref 1.7–7)
NEUTROPHILS NFR BLD AUTO: 58.3 % (ref 42.7–76)
NRBC BLD AUTO-RTO: 0 /100 WBC (ref 0–0.2)
PLATELET # BLD AUTO: 425 10*3/MM3 (ref 140–450)
PMV BLD AUTO: 9 FL (ref 6–12)
POTASSIUM SERPL-SCNC: 4.3 MMOL/L (ref 3.5–5.2)
PROT SERPL-MCNC: 7.2 G/DL (ref 6–8.5)
RBC # BLD AUTO: 5 10*6/MM3 (ref 3.77–5.28)
SODIUM SERPL-SCNC: 131 MMOL/L (ref 136–145)
TRIGL SERPL-MCNC: 88 MG/DL (ref 0–150)
TSH SERPL DL<=0.05 MIU/L-ACNC: 3.04 UIU/ML (ref 0.27–4.2)
VLDLC SERPL-MCNC: 16 MG/DL (ref 5–40)
WBC NRBC COR # BLD AUTO: 8.73 10*3/MM3 (ref 3.4–10.8)

## 2024-10-18 PROCEDURE — 84443 ASSAY THYROID STIM HORMONE: CPT

## 2024-10-18 PROCEDURE — 80053 COMPREHEN METABOLIC PANEL: CPT

## 2024-10-18 PROCEDURE — 85025 COMPLETE CBC W/AUTO DIFF WBC: CPT

## 2024-10-18 PROCEDURE — 80061 LIPID PANEL: CPT

## 2024-10-18 PROCEDURE — 36415 COLL VENOUS BLD VENIPUNCTURE: CPT

## 2024-10-21 ENCOUNTER — TELEPHONE (OUTPATIENT)
Dept: FAMILY MEDICINE CLINIC | Facility: CLINIC | Age: 69
End: 2024-10-21
Payer: MEDICARE

## 2024-10-21 NOTE — TELEPHONE ENCOUNTER
Caller: Selene Forrest    Relationship: Self    Best call back number: [phone]     Caller requesting test results: SELF    What test was performed: BLOOD WORK    When was the test performed: 10/18/24    Where was the test performed: [facility]

## 2024-11-07 ENCOUNTER — HOSPITAL ENCOUNTER (OUTPATIENT)
Dept: MAMMOGRAPHY | Facility: HOSPITAL | Age: 69
Discharge: HOME | End: 2024-11-07
Admitting: NURSE PRACTITIONER
Payer: MEDICARE

## 2024-11-07 DIAGNOSIS — Z12.31 ENCOUNTER FOR SCREENING MAMMOGRAM FOR MALIGNANT NEOPLASM OF BREAST: ICD-10-CM

## 2024-11-07 PROCEDURE — 77063 BREAST TOMOSYNTHESIS BI: CPT

## 2024-11-07 PROCEDURE — 77067 SCR MAMMO BI INCL CAD: CPT

## 2024-11-08 DIAGNOSIS — N64.89 BREAST ASYMMETRY: Primary | ICD-10-CM

## 2024-11-23 DIAGNOSIS — E03.9 HYPOTHYROIDISM, UNSPECIFIED TYPE: ICD-10-CM

## 2024-11-23 DIAGNOSIS — F32.A DEPRESSION, UNSPECIFIED DEPRESSION TYPE: ICD-10-CM

## 2024-11-25 RX ORDER — LEVOTHYROXINE SODIUM 25 UG/1
25 TABLET ORAL DAILY
Qty: 90 TABLET | Refills: 1 | Status: SHIPPED | OUTPATIENT
Start: 2024-11-25

## 2024-12-19 ENCOUNTER — HOSPITAL ENCOUNTER (OUTPATIENT)
Dept: ULTRASOUND IMAGING | Facility: HOSPITAL | Age: 69
Discharge: HOME | End: 2024-12-19
Payer: MEDICARE

## 2024-12-19 ENCOUNTER — HOSPITAL ENCOUNTER (OUTPATIENT)
Dept: MAMMOGRAPHY | Facility: HOSPITAL | Age: 69
Discharge: HOME | End: 2024-12-19
Payer: MEDICARE

## 2024-12-19 DIAGNOSIS — N64.89 BREAST ASYMMETRY: ICD-10-CM

## 2024-12-19 PROCEDURE — 76642 ULTRASOUND BREAST LIMITED: CPT

## 2025-01-20 ENCOUNTER — HOSPITAL ENCOUNTER (OUTPATIENT)
Dept: MAMMOGRAPHY | Facility: HOSPITAL | Age: 70
Discharge: HOME | End: 2025-01-20
Payer: MEDICARE

## 2025-01-20 ENCOUNTER — TELEPHONE (OUTPATIENT)
Dept: PLASTIC SURGERY | Facility: CLINIC | Age: 70
End: 2025-01-20
Payer: MEDICARE

## 2025-01-20 ENCOUNTER — PATIENT OUTREACH (OUTPATIENT)
Dept: ONCOLOGY | Facility: HOSPITAL | Age: 70
End: 2025-01-20
Payer: MEDICARE

## 2025-01-20 DIAGNOSIS — N63.0 BREAST MASS IN FEMALE: Primary | ICD-10-CM

## 2025-01-20 DIAGNOSIS — N63.41 SUBAREOLAR MASS OF RIGHT BREAST: ICD-10-CM

## 2025-01-20 DIAGNOSIS — N63.10 MASS OF RIGHT BREAST, UNSPECIFIED QUADRANT: ICD-10-CM

## 2025-01-20 PROCEDURE — 19081 BX BREAST 1ST LESION STRTCTC: CPT

## 2025-01-20 RX ORDER — DIAPER,BRIEF,INFANT-TODD,DISP
EACH MISCELLANEOUS
Status: DISCONTINUED
Start: 2025-01-20 | End: 2025-01-21 | Stop reason: HOSPADM

## 2025-01-20 RX ORDER — LIDOCAINE HYDROCHLORIDE 10 MG/ML
INJECTION, SOLUTION INFILTRATION; PERINEURAL
Status: DISCONTINUED
Start: 2025-01-20 | End: 2025-01-21 | Stop reason: HOSPADM

## 2025-01-20 RX ORDER — LIDOCAINE HYDROCHLORIDE AND EPINEPHRINE 10; 10 MG/ML; UG/ML
INJECTION, SOLUTION INFILTRATION; PERINEURAL
Status: DISCONTINUED
Start: 2025-01-20 | End: 2025-01-21 | Stop reason: HOSPADM

## 2025-01-20 NOTE — TELEPHONE ENCOUNTER
Rcvd call from [facility]ahra nurse navigator inquiring if pt can be seen for right breast bx.  Radiology wasn't able to do due to so close to nipple. Advised to have PCP put in for consult for  to evaluate.

## 2025-01-22 ENCOUNTER — PATIENT OUTREACH (OUTPATIENT)
Dept: ONCOLOGY | Facility: HOSPITAL | Age: 70
End: 2025-01-22
Payer: MEDICARE

## 2025-01-23 NOTE — PROGRESS NOTES
Consult (consult)            History of Present Illness  Selene Forrest is a 69 y.o. female who presents to [facility] as a consult from KIM Curiel   to discuss Right Breast Mass.  She reports that the  lesion was detected during her breast screening imaging exam. She has a prior history of breast abnormalities with lumpectomy and a family history of breast cancer in mother and aunt.  Patient also has a personal history of Breast Augmentation in 1992.  She is also here to discuss breast reconstructive options.  Patient appeared for screening mammogram and it was observed a 10x 5 mm lesion under the right nipple. She was sent to ultrasound for a core biopsy but due to a narrow space between the lesion and the implant (3mm) she was sent to me for an open biopsy of the nipple.     Dx mammo 11/7/24  Breast US 12/19/24  Mammo 1/20/25    Will need pulmonary clearance - Dr. Nichols    Pt is a smoker from time to time.    Subjective      Patient has no known allergies.  Allergies Reconciled.     @path@  Mammo Discontinued Procedure    Result Date: 1/20/2025  Narrative: MAMMO DISCONTINUED PROCEDURE-  Date of Exam: 1/20/2025 1:37 PM  Indication: right breast mass; N63.41-Unspecified lump in right breast, subareolar.  Comparison: 12/19/2024  Technique: Focused right breast ultrasound.  Findings: The patient presented for ultrasound-guided biopsy of a mass in the retroareolar right breast. Imaging of the retroareolar right breast performed today demonstrate a 1 cm x 5 mm oval fairly well-circumscribed slightly hypoechoic mass on the surface of the implant. There is approximately 3 mm of tissue between the skin and implant surface. Ultrasound-guided biopsy was not performed due to the small amount of tissue in the risk of injury to the implant or skin. Recommend surgical consultation for excisional biopsy. Findings discussed with the patient following the  "examination.    Electronically Signed By-GORDON REA MD On:2025 2:48 PM         GYNECOLOGIC HISTORY:   . P: 0. AB: 0.  Menarche: 13  Last menstrual period: 2000  Age at first childbirth: na  Lactation/How long: na  Hormone replacement years: none      Review of Systems  All system were reviewed and were negative, except the ones noted above.     @Objective     /91 (BP Location: Right arm, Patient Position: Sitting, Cuff Size: Adult)   Pulse 79   Ht 160 cm (62.99\")   Wt 78.9 kg (174 lb)   SpO2 91%   BMI 30.83 kg/m²     Body mass index is 30.83 kg/m².           Physical Exam  Capsular contracture of the breast implants is noted. A palpable nodule is present right under the nipple of the right breast. Retraction and dry fluid are observed on the left nipple.  Redness is noted in the axilla.      Patient awake, alert, oriented  Respirations are non elaborated  Patient is not tachycardic    Breast exam:     Bilateral breast with baker 3 implant capsular contracture. Breasts mass is composed mostly by the implant, breast tissue is minimal   Right: palpable subareolar mass   Left: no palpable masses or tenderness, the skin on the left nipple is dry and there is dry drainage on the nipple. It might be dirt. , lymph nodes are normal   Axillary Lymphadenopathy: No axillary lymphadenopathy        Result Review :              Assessment and Plan    Specific to this patient:  Assessment & Plan  1. Capsular contracture of breast implants.  The patient has capsular contracture of her breast implants. She reports no pain and is used to them.    2. Right breast nodule.  A palpable nodule was found under the right nipple. An in-office procedure for a biopsy of the right breast nodule will be scheduled. The patient was informed that if the biopsy results are benign, no further action will be needed. If the results indicate malignancy, a more extensive surgical intervention, including the removal of the breast and " implant, as well as sampling of the axillary lymph nodes, may be necessary.    3. Left nipple retraction and dry fluid.  The left nipple shows retraction and dry fluid. An in-office procedure for a biopsy of the left nipple will be scheduled. The patient was informed that if the biopsy results are benign, no further action will be needed. If the results indicate malignancy, a more extensive surgical intervention may be necessary.    4. Axillary erythema.  Redness was observed in the patient's armpit, possibly due to shaving. No lumps or bumps were found under the axilla.     IOP: bilateral breast biopsies- 30 min  [id number]- breast biopsy         Women's Health and Cancer Rights Act (WHCRA)  The Women's Health and Cancer Rights Act of 1998 (WHCRA) is a federal law that provides protections to patients who choose to have breast reconstruction in connection with a mastectomy.  Coverage must be provided for:    1.All stages of reconstruction of the breast on which the mastectomy has been performed;  2.Surgery and reconstruction of the other breast to produce a symmetrical appearance; and  Prostheses and treatment of physical complications of all stages of the mastectomy, including lymphedema.    This law applies to two different types of coverage:    1.Group health plans (provided by an employer or union);  2.Individual health insurance policies (not based on employment).          Follow Up     No follow-ups on file.    Patient was given instructions and counseling regarding her condition. Please see specific information pulled into the AVS if appropriate.       Patient or patient representative verbalized consent for the use of Ambient Listening during the visit with  Rocky Workman MD for chart documentation. 2/8/2025  12:28 EST

## 2025-02-05 ENCOUNTER — OFFICE VISIT (OUTPATIENT)
Dept: PLASTIC SURGERY | Facility: CLINIC | Age: 70
End: 2025-02-05
Payer: MEDICARE

## 2025-02-05 VITALS
HEIGHT: 63 IN | SYSTOLIC BLOOD PRESSURE: 150 MMHG | WEIGHT: 174 LBS | HEART RATE: 79 BPM | BODY MASS INDEX: 30.83 KG/M2 | DIASTOLIC BLOOD PRESSURE: 91 MMHG | OXYGEN SATURATION: 91 %

## 2025-02-05 DIAGNOSIS — N63.0 BREAST MASS IN FEMALE: Primary | ICD-10-CM

## 2025-02-08 PROBLEM — N63.0 BREAST MASS IN FEMALE: Status: ACTIVE | Noted: 2025-02-08

## 2025-02-10 NOTE — LETTER
2/10/2025        Dear Dr. Simone FOREMAN,        I am writing on behalf of our mutual patient, Selene Forrest, : 1955 which is scheduled to have a breast reconstructive surgery with removal of breast and implants, as well as sampling of the axillary lymph nodes with Dr. Rocky Workman, which will be performed at [facility] office. Please respond to this request noting your recommendations regarding clearance from a pulmonology standpoint. You may contact our office at [phone] with any questions. Please return this form to our office as soon as possible to [phone]. Selene Forrest, will be scheduled for this procedure pending your approval. Thank you for your time and assistance.       _____ I approve my patient, Selene Forrest, to proceed with the surgical procedure listed above.      _____ I do NOT approve my patient, Selene Forrest to proceed with the surgical procedure listed above.        Comments:              Approving physician name (please print)_________________________________________    Approving physician signature______________________________ Date_______________      Sincerely,          Nicole Cortez MA

## 2025-02-26 ENCOUNTER — HOSPITAL ENCOUNTER (OUTPATIENT)
Dept: CT IMAGING | Facility: HOSPITAL | Age: 70
Discharge: HOME | End: 2025-02-26
Admitting: NURSE PRACTITIONER
Payer: MEDICARE

## 2025-02-26 ENCOUNTER — TELEPHONE (OUTPATIENT)
Dept: PLASTIC SURGERY | Facility: CLINIC | Age: 70
End: 2025-02-26

## 2025-02-26 DIAGNOSIS — Z87.891 PERSONAL HISTORY OF NICOTINE DEPENDENCE: ICD-10-CM

## 2025-02-26 PROCEDURE — 71271 CT THORAX LUNG CANCER SCR C-: CPT

## 2025-02-26 NOTE — TELEPHONE ENCOUNTER
Hub staff attempted to follow warm transfer process and was unsuccessful     Caller: EVAN FROM [facility]. PT IS GETTING A CT TODAY AT 1PM    Relationship to patient: NA    Best call back number: [phone]    Patient is needing: PLEASE CALL PT AND EFRAÍN IN OFFICE PROCEDURE (RECON BIOPSY OF RT BREAST) FROM 2-26-26 AT 1030AM

## 2025-02-27 NOTE — PROGRESS NOTES
Primary Care Provider  Gwen Chavez APRN   Referring Provider  No ref. provider found    Patient Complaint  Follow-up, Shortness of Breath (On exertion ), and COPD    Patient or patient representative verbalized consent for the use of Ambient Listening during the visit with  KIM Ayers for chart documentation. 2/28/2025  13:53 EST      Subjective       History of Presenting Illness  Selene Forrest is a pleasant 69 y.o. female  of  Dr. Nichols who presents to [facility] with history of emphysema, dyspnea with exertion, tobacco abuse, right breast mass, here for follow-up appointment and surgical clearance.  Patient was last seen 9/23/2024.  Patient has been on Brovana Pulmicort maintenance nebulizers and Spiriva maintenance inhaler.    Patient had low-dose lung cancer screening 2/26/2024 which has not resulted yet.    Patient will be having a right breast biopsy April 2025.  History of Present Illness  The patient is a 79-year-old female who presents for evaluation of severe COPD.    She has not required hospitalization or urgent care visits for her pulmonary condition since her last consultation. She reports no recent use of steroids or antibiotics, and has not experienced symptoms such as fever, chills, or cough. She does not report any chest pain, heart pain, or palpitations. She does not utilize supplemental oxygen but notes an increase in her oxygen saturation levels following treatment. She admits to resuming smoking approximately 1 month ago, consuming about a pack per day, and is seeking assistance to quit. She has previously attempted smoking cessation with Chantix but discontinued due to the high cost of the medication.    She is scheduled to have a surgical excisional biopsy of a lump found on her mammogram, which is close to her implant. The procedure is scheduled for 04/2025. She missed an appointment with Dr. Workman yesterday and has  another appointment scheduled for 2025.    SOCIAL HISTORY  The patient admits to smoking a pack a day and resumed smoking about a month ago.    FAMILY HISTORY  The patient's mother had COPD and was a smoker.    MEDICATIONS  Past: Chantix     At present time patient denies coughing, wheezing, headaches, chest pain, weight loss or hemoptysis. Patient denies fevers, chills and night sweats. Selene Forrest is able to perform ADLs.      I have personally reviewed the review of systems, past family, social, medical and surgical histories; and agree with their findings.      Review of Systems   Constitutional: Negative.    HENT: Negative.     Respiratory:  Positive for shortness of breath.    Cardiovascular: Negative.    Musculoskeletal: Negative.    Neurological: Negative.    Psychiatric/Behavioral: Negative.           Family History   Problem Relation Age of Onset    Stroke Mother     Hypertension Mother     Breast cancer Mother     Heart disease Father     Heart disease Brother     Stroke Maternal Grandfather     Heart disease Paternal Grandmother     Heart disease Paternal Grandfather     Breast cancer Other     Breast cancer Other     Diabetes Other         Social History     Socioeconomic History    Marital status: Single   Tobacco Use    Smoking status: Former     Current packs/day: 0.00     Average packs/day: 2.0 packs/day for 22.0 years (44.0 ttl pk-yrs)     Types: Cigarettes     Start date:      Quit date: 2000     Years since quittin.1     Passive exposure: Current    Smokeless tobacco: Never    Tobacco comments:     started at 21   Vaping Use    Vaping status: Never Used   Substance and Sexual Activity    Alcohol use: Yes     Comment: OCC    Drug use: Never    Sexual activity: Defer        Past Medical History:   Diagnosis Date    Abdominal hernia     COPD (chronic obstructive pulmonary disease)     SOB AT TIMES, HX SMOKER PER PT- NO INHALERS- DENIED ANY SOB AT THIS TIME    Depression      "Disease of thyroid gland     Hx of bone density study 08/13/2019    Ventral hernia     Visit for screening mammogram 01/14/2019        Immunization History   Administered Date(s) Administered    Fluzone High-Dose 65+YRS 09/23/2024    Fluzone Quad >6mos (Multi-dose) 11/12/2019    Pneumococcal Conjugate 20-Valent (PCV20) 01/11/2024       No Known Allergies       Current Outpatient Medications:     albuterol sulfate  (90 Base) MCG/ACT inhaler, INHALE 2 PUFFS EVERY 4 HOURS AS NEEDED FOR WHEEZING OR SHORTNESS OF AIR, Disp: 18 g, Rfl: 5    arformoterol (Brovana) 15 MCG/2ML nebulizer solution, Take 2 mL by nebulization 2 (Two) Times a Day., Disp: , Rfl:     budesonide (Pulmicort) 0.5 MG/2ML nebulizer solution, Take 2 mL by nebulization Daily., Disp: , Rfl:     FLUoxetine (PROzac) 20 MG capsule, TAKE 1 CAPSULE BY MOUTH DAILY, Disp: 90 capsule, Rfl: 1    levothyroxine (SYNTHROID, LEVOTHROID) 25 MCG tablet, TAKE 1 TABLET BY MOUTH DAILY, Disp: 90 tablet, Rfl: 1    tiotropium bromide monohydrate (SPIRIVA RESPIMAT) 2.5 MCG/ACT aerosol solution inhaler, Inhale 2 puffs Daily., Disp: 1 each, Rfl: 11         Vital Signs   /88 (BP Location: Right arm, Patient Position: Sitting, Cuff Size: Adult)   Pulse 78   Temp 96.8 °F (36 °C)   Resp 16   Ht 160 cm (62.98\")   Wt 78.9 kg (174 lb)   SpO2 91%   BMI 30.84 kg/m²       Objective     Physical Exam  Vitals reviewed.   Constitutional:       Appearance: Normal appearance.   HENT:      Head: Normocephalic and atraumatic.      Nose: Nose normal.      Mouth/Throat:      Mouth: Mucous membranes are moist.      Pharynx: Oropharynx is clear.   Eyes:      Extraocular Movements: Extraocular movements intact.      Conjunctiva/sclera: Conjunctivae normal.      Pupils: Pupils are equal, round, and reactive to light.   Cardiovascular:      Rate and Rhythm: Normal rate and regular rhythm.      Pulses: Normal pulses.      Heart sounds: Normal heart sounds.   Pulmonary:      Effort: " "Pulmonary effort is normal.      Breath sounds: Normal breath sounds.   Abdominal:      General: Bowel sounds are normal.   Musculoskeletal:         General: Normal range of motion.      Cervical back: Normal range of motion and neck supple.   Skin:     General: Skin is warm and dry.   Neurological:      Mental Status: She is alert and oriented to person, place, and time.   Psychiatric:         Behavior: Behavior normal.         Physical Exam  Lungs are clear. No wheezing.  Heart is regular.    Vital Signs  Oxygen saturation is at 91 percent.         Results Review  I have personally reviewed the prior office notes, hospital records, labs, and diagnostics.      Assessment         Patient Active Problem List   Diagnosis    Abnormal bone density screening    Depression    Incisional hernia, without obstruction or gangrene    COPD exacerbation    Chronic bronchitis    Smoking greater than 40 pack years    Chronic respiratory failure with hypoxia    Breast mass in female        Plan     Diagnoses and all orders for this visit:    1. Pulmonary emphysema, unspecified emphysema type (Primary)    2. Chronic respiratory failure with hypoxia    3. Personal history of nicotine dependence    4. Mass of right breast, unspecified quadrant         Assessment & Plan  1. Chronic Obstructive Pulmonary Disease (COPD).  Her pulmonary function test results indicate severe COPD. She is currently not using oxygen, but her saturation is 91%. She continues to smoke, having resumed about a month ago. She was advised to quit smoking to prevent further lung damage. The potential benefits of nicotine patches and the [facility] program \"1-800-QUIT-NOW\" were discussed.  Educational handout provided on [facility] 1 1 800 quit NOW.  A prescription for Chantix was considered but not given due to its high cost and her previous experience with nightmares. She was encouraged to stay proactive with her rescue medications and to get vaccinated to prevent " infections that could worsen her COPD. She will be contacted with the results of her CT scan once they become available.    2. Breast Lump.  A lump was detected near her breast implant on a recent mammogram. She is scheduled for a surgical excisional biopsy in 04/2025. An attempt will be made to expedite this appointment due to concerns about potential cancer.    Follow-up  The patient will follow up in 6 months, or earlier if necessary.      Selene Forrest is cleared for surgery with moderate risk for perioperative complications.  Patient has pulmonary emphysema.  Preoperative bronchodilators via nebulizer, incentive spirometry, early ambulation and use of noninvasive positive pressure ventilation as soon as the patient is extubated would help recommend postoperative nebulizers incentive spirometer as well as flutter to help decrease risk of atelectasis and pneumonia.    Smoking status:  reports that she quit smoking about 25 years ago. Her smoking use included cigarettes. She started smoking about 47 years ago. She has a 44 pack-year smoking history. She has been exposed to tobacco smoke. She has never used smokeless tobacco.    Vaccination status: Reviewed  Immunization History   Administered Date(s) Administered    Fluzone High-Dose 65+YRS 09/23/2024    Fluzone Quad >6mos (Multi-dose) 11/12/2019    Pneumococcal Conjugate 20-Valent (PCV20) 01/11/2024        Medications personally reviewed    Follow Up  Return in about 6 months (around 8/28/2025).    Patient was given instructions and counseling regarding her condition or for health maintenance advice. Please see specific information pulled into the AVS if appropriate.     I spent 16 minutes caring for Selene Forrest on this date of service. This time includes time spent by me in the following activities:preparing for the visit, reviewing tests, obtaining and/or reviewing a separately obtained history, performing a medically appropriate examination  and/or evaluation, counseling and educating the patient/family/caregiver, ordering medications, tests, or procedures, documenting information in the medical record, independently interpreting results and communicating that information with the patient/family/caregiver and answered questions family members, discuss medications.

## 2025-02-28 ENCOUNTER — OFFICE VISIT (OUTPATIENT)
Dept: PULMONOLOGY | Facility: CLINIC | Age: 70
End: 2025-02-28
Payer: MEDICARE

## 2025-02-28 VITALS
BODY MASS INDEX: 30.83 KG/M2 | HEART RATE: 78 BPM | OXYGEN SATURATION: 91 % | TEMPERATURE: 96.8 F | SYSTOLIC BLOOD PRESSURE: 157 MMHG | HEIGHT: 63 IN | RESPIRATION RATE: 16 BRPM | WEIGHT: 174 LBS | DIASTOLIC BLOOD PRESSURE: 88 MMHG

## 2025-02-28 DIAGNOSIS — J96.11 CHRONIC RESPIRATORY FAILURE WITH HYPOXIA: ICD-10-CM

## 2025-02-28 DIAGNOSIS — F17.210 CIGARETTE NICOTINE DEPENDENCE WITHOUT COMPLICATION: Primary | ICD-10-CM

## 2025-02-28 DIAGNOSIS — J43.9 PULMONARY EMPHYSEMA, UNSPECIFIED EMPHYSEMA TYPE: Primary | ICD-10-CM

## 2025-02-28 DIAGNOSIS — N63.10 MASS OF RIGHT BREAST, UNSPECIFIED QUADRANT: ICD-10-CM

## 2025-02-28 DIAGNOSIS — Z87.891 PERSONAL HISTORY OF NICOTINE DEPENDENCE: ICD-10-CM

## 2025-02-28 PROCEDURE — 99214 OFFICE O/P EST MOD 30 MIN: CPT | Performed by: NURSE PRACTITIONER

## 2025-02-28 PROCEDURE — 1159F MED LIST DOCD IN RCRD: CPT | Performed by: NURSE PRACTITIONER

## 2025-02-28 PROCEDURE — 1160F RVW MEDS BY RX/DR IN RCRD: CPT | Performed by: NURSE PRACTITIONER

## 2025-04-02 NOTE — PROGRESS NOTES
"Chief Complaint  Procedure    Subjective              History of Present Illness  Selene Forrest is a 69 y.o. female who presents to [facility] as a in office procedure for right breast biopsy.      Allergies: Patient has no known allergies.  Allergies Reconciled.    Review of Systems   All review of system has been reviewed and it  is negative except the ones note above.     Objective     /82 (BP Location: Right arm, Patient Position: Sitting, Cuff Size: Adult)   Pulse 77   Ht 160 cm (62.99\")   Wt 78.9 kg (174 lb)   SpO2 91%   BMI 30.83 kg/m²     Body mass index is 30.83 kg/m².    Physical Exam    Cardiovascular: Normal rate    Pulmonary/Chest: Effort normal    Right breast with a sub areolar mobile mass of approximately 2x2 cm. Implant is palpable with hard capsule     Result Review :             Excision Procedure: Consent obtained. Local injected to site, Lidocaine 1% with epi.  Site prepped with ChloraPrep  in sterile fashion. Site draped in sterile fashion. I dissected down through skin and subcutaneous tissue completely around  right breast lesion, after excision of  was sent from field for pathology. Established hemostasis with sutures. Site was thoroughly irrigated. Site closed with 4-0 monocryl in a subcutaneous fashion to obliterate dead space, then with 4-0 Monocryl in an interrupted fashion. Site cleaned with sterile normal saline. Steri strips applied. The patient tolerated the procedure well with no immediate complications.   Lesion excision: 1x 1 cm - open breast biopsy of right subareolar lesion          Assessment and Plan      Diagnoses and all orders for this visit:    1. Breast mass in female (Primary)  -     Tissue Pathology Exam; Future  -     Tissue Pathology Exam    2. Subareolar mass of right breast  -     Tissue Pathology Exam; Future  -     Tissue Pathology Exam        Plan:  We will wait for th pathology report to " evaluate treatment           Follow Up     No follow-ups on file.    Patient was given instructions and counseling regarding her condition. Please see specific information pulled into the AVS if appropriate.     Rocky Workman MD  04/10/2025

## 2025-04-10 ENCOUNTER — PROCEDURE VISIT (OUTPATIENT)
Dept: PLASTIC SURGERY | Facility: CLINIC | Age: 70
End: 2025-04-10
Payer: MEDICARE

## 2025-04-10 VITALS
SYSTOLIC BLOOD PRESSURE: 143 MMHG | HEIGHT: 63 IN | DIASTOLIC BLOOD PRESSURE: 82 MMHG | HEART RATE: 77 BPM | BODY MASS INDEX: 30.83 KG/M2 | OXYGEN SATURATION: 91 % | WEIGHT: 174 LBS

## 2025-04-10 DIAGNOSIS — N63.41 SUBAREOLAR MASS OF RIGHT BREAST: ICD-10-CM

## 2025-04-10 DIAGNOSIS — N63.0 BREAST MASS IN FEMALE: Primary | ICD-10-CM

## 2025-04-10 PROCEDURE — 88302 TISSUE EXAM BY PATHOLOGIST: CPT | Performed by: SURGERY

## 2025-04-10 PROCEDURE — 88342 IMHCHEM/IMCYTCHM 1ST ANTB: CPT | Performed by: SURGERY

## 2025-04-15 ENCOUNTER — TELEPHONE (OUTPATIENT)
Dept: PLASTIC SURGERY | Facility: CLINIC | Age: 70
End: 2025-04-15
Payer: MEDICARE

## 2025-04-15 LAB
CYTO UR: NORMAL
LAB AP CASE REPORT: NORMAL
LAB AP CLINICAL INFORMATION: NORMAL
LAB AP SPECIAL STAINS: NORMAL
PATH REPORT.FINAL DX SPEC: NORMAL
PATH REPORT.GROSS SPEC: NORMAL

## 2025-04-15 NOTE — TELEPHONE ENCOUNTER
I called patient to give her pathology. It is benign. Nothing further to do. Patient is aware. Follow up prn.    Rocky Paula MD, PhD  Plastic and reconstructive surgery

## 2025-04-30 ENCOUNTER — OFFICE VISIT (OUTPATIENT)
Dept: FAMILY MEDICINE CLINIC | Facility: CLINIC | Age: 70
End: 2025-04-30
Payer: MEDICARE

## 2025-04-30 VITALS
OXYGEN SATURATION: 92 % | WEIGHT: 179 LBS | SYSTOLIC BLOOD PRESSURE: 139 MMHG | DIASTOLIC BLOOD PRESSURE: 65 MMHG | HEART RATE: 66 BPM | HEIGHT: 63 IN | BODY MASS INDEX: 31.71 KG/M2

## 2025-04-30 DIAGNOSIS — Z00.00 MEDICARE ANNUAL WELLNESS VISIT, SUBSEQUENT: Primary | ICD-10-CM

## 2025-04-30 DIAGNOSIS — J96.01 ACUTE RESPIRATORY FAILURE WITH HYPOXIA: ICD-10-CM

## 2025-04-30 DIAGNOSIS — J44.1 CHRONIC OBSTRUCTIVE PULMONARY DISEASE WITH ACUTE EXACERBATION: ICD-10-CM

## 2025-04-30 RX ORDER — ALBUTEROL SULFATE 90 UG/1
2 INHALANT RESPIRATORY (INHALATION) EVERY 4 HOURS PRN
Qty: 18 G | Refills: 5 | Status: SHIPPED | OUTPATIENT
Start: 2025-04-30

## 2025-07-08 DIAGNOSIS — F32.A DEPRESSION, UNSPECIFIED DEPRESSION TYPE: ICD-10-CM

## 2025-07-08 DIAGNOSIS — E03.9 HYPOTHYROIDISM, UNSPECIFIED TYPE: ICD-10-CM

## 2025-07-08 RX ORDER — LEVOTHYROXINE SODIUM 25 UG/1
25 TABLET ORAL DAILY
Qty: 90 TABLET | Refills: 1 | Status: SHIPPED | OUTPATIENT
Start: 2025-07-08

## (undated) DEVICE — GOWN,REINFRCE,POLY,SIRUS,BREATH SLV,XXLG: Brand: MEDLINE

## (undated) DEVICE — STERILE POLYISOPRENE POWDER-FREE SURGICAL GLOVES WITH EMOLLIENT COATING: Brand: PROTEXIS

## (undated) DEVICE — TIP COVER ACCESSORY

## (undated) DEVICE — LAPAROVUE VISIBILITY SYSTEM LAPAROSCOPIC SOLUTIONS: Brand: LAPAROVUE

## (undated) DEVICE — SUT MERSILENE POLYSTR CT1 BR 0 75CM GRN

## (undated) DEVICE — GLV SURG SENSICARE PI ORTHO SZ8 LF STRL

## (undated) DEVICE — DRSNG WND GZ CURAD OIL EMULSION 3X3IN STRL

## (undated) DEVICE — PENCL E/S HNDSWCH ROCKR CB

## (undated) DEVICE — INTENDED FOR TISSUE SEPARATION, AND OTHER PROCEDURES THAT REQUIRE A SHARP SURGICAL BLADE TO PUNCTURE OR CUT.: Brand: BARD-PARKER ® CARBON RIB-BACK BLADES

## (undated) DEVICE — CANNULA SEAL

## (undated) DEVICE — SOL IRR NACL 0.9PCT BT 1000ML

## (undated) DEVICE — SYS CLOSE PORTII CARTR/THOMASN XL

## (undated) DEVICE — NON-WOVEN ADHESIVE WOUND DRESSING: Brand: PRIMAPORE ADHESIVE WOUND DRSG 7.2*5CM

## (undated) DEVICE — STRIP CLS WND SUTURESTRIP/PLS 0.5X4IN TP1103

## (undated) DEVICE — ARM DRAPE

## (undated) DEVICE — LAPAROSCOPIC SCISSORS: Brand: EPIX LAPAROSCOPIC SCISSORS

## (undated) DEVICE — APPL CHLORAPREP HI/LITE 26ML ORNG

## (undated) DEVICE — SLV SCD KN/LEN ADJ EXPRSS BLENDED MD 1P/U

## (undated) DEVICE — DAVINCI-LF: Brand: MEDLINE INDUSTRIES, INC.

## (undated) DEVICE — ANTIBACTERIAL UNDYED BRAIDED (POLYGLACTIN 910), SYNTHETIC ABSORBABLE SUTURE: Brand: COATED VICRYL

## (undated) DEVICE — ENDOPATH XCEL WITH OPTIVIEW TECHNOLOGY BLADELESS TROCARS WITH STABILITY SLEEVES: Brand: ENDOPATH XCEL OPTIVIEW